# Patient Record
Sex: FEMALE | Race: BLACK OR AFRICAN AMERICAN
[De-identification: names, ages, dates, MRNs, and addresses within clinical notes are randomized per-mention and may not be internally consistent; named-entity substitution may affect disease eponyms.]

---
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
## 2019-01-22 ENCOUNTER — APPOINTMENT (OUTPATIENT)
Dept: ENDOCRINOLOGY | Facility: CLINIC | Age: 19
End: 2019-01-22

## 2019-01-22 ENCOUNTER — APPOINTMENT (OUTPATIENT)
Dept: ENDOCRINOLOGY | Facility: CLINIC | Age: 19
End: 2019-01-22
Payer: MEDICAID

## 2019-01-22 VITALS
SYSTOLIC BLOOD PRESSURE: 110 MMHG | HEIGHT: 61.38 IN | BODY MASS INDEX: 28.89 KG/M2 | HEART RATE: 80 BPM | WEIGHT: 155 LBS | DIASTOLIC BLOOD PRESSURE: 80 MMHG | OXYGEN SATURATION: 99 %

## 2019-01-22 PROCEDURE — 99214 OFFICE O/P EST MOD 30 MIN: CPT

## 2019-01-22 RX ORDER — GLUCAGON 1 MG
1 KIT INJECTION
Qty: 1 | Refills: 1 | Status: DISCONTINUED | COMMUNITY
Start: 2018-05-17 | End: 2019-01-22

## 2019-01-22 NOTE — ASSESSMENT
[FreeTextEntry1] : 17 year old female with type 1 DM here to establish adult endocrine visit. \par \par 1)Type 1 DM\par Poorly controlled, glucose log very variable.  \par She states that she has been adherent with her insulin regimen. \par For now, will continue with \par Insulin Glargine 24 units QHS, continue with IC 1:10 and ISF 1:40 with goal of 120mg/dl. \par We will order the CGM. DEXCOM G6 given history of hypgoclyemia, hyperglycemia and difficult to controlled Type 1 DM. \par It's difficult to make any insulin regimen change now.  \par Obtain TSH and celiac panel. \par Has ketone strips and glucagon. \par ODROTHY SENSOR [id number]\par \par 2)HTN\par Microalbumin negative, following with nephrology\par \par 3)Cholesterol \par Check fasting lipid panel. \par \par PHONE # [phone] [Carbohydrate Consistent Diet] : carbohydrate consistent diet [Hypoglycemia Management] : hypoglycemia management [Glucagon Use] : glucagon use [Ketone Testing] : ketone testing [Sick-Day Management] : sick-day management [Diabetes Foot Care] : diabetes foot care [Long Term Vascular Complications] : long term vascular complications of diabetes [Importance of Diet and Exercise] : importance of diet and exercise to improve glycemic control, achieve weight loss and improve cardiovascular health [Action and use of Insulin] : action and use of short and long-acting insulin [Self Monitoring of Blood Glucose] : self monitoring of blood glucose [Insulin Self-Administration] : insulin self-administration [Injection Technique, Storage, Sharps Disposal] : injection technique, storage, and sharps disposal [Retinopathy Screening] : Patient was referred to ophthalmology for retinopathy screening

## 2019-01-22 NOTE — HISTORY OF PRESENT ILLNESS
[FreeTextEntry1] : see glucose log.  very variable.  with hypoglycemia and hyperglycemia after lunch,.

## 2019-01-24 ENCOUNTER — APPOINTMENT (OUTPATIENT)
Dept: OPHTHALMOLOGY | Facility: CLINIC | Age: 19
End: 2019-01-24

## 2019-01-24 ENCOUNTER — APPOINTMENT (OUTPATIENT)
Dept: PEDIATRIC NEPHROLOGY | Facility: HOSPITAL | Age: 19
End: 2019-01-24
Payer: MEDICAID

## 2019-01-24 VITALS
DIASTOLIC BLOOD PRESSURE: 69 MMHG | HEART RATE: 95 BPM | SYSTOLIC BLOOD PRESSURE: 117 MMHG | HEIGHT: 61.38 IN | WEIGHT: 154.98 LBS | BODY MASS INDEX: 28.89 KG/M2

## 2019-01-24 LAB
ALBUMIN SERPL ELPH-MCNC: 3.7 G/DL
ALP BLD-CCNC: 109 U/L
ALT SERPL-CCNC: 14 U/L
ANION GAP SERPL CALC-SCNC: 9 MMOL/L
AST SERPL-CCNC: 22 U/L
BILIRUB SERPL-MCNC: 0.2 MG/DL
BUN SERPL-MCNC: 13 MG/DL
CALCIUM SERPL-MCNC: 9.6 MG/DL
CHLORIDE SERPL-SCNC: 101 MMOL/L
CHOLEST SERPL-MCNC: 201 MG/DL
CHOLEST/HDLC SERPL: 3.5 RATIO
CO2 SERPL-SCNC: 24 MMOL/L
CREAT SERPL-MCNC: 0.67 MG/DL
GLUCOSE BLDC GLUCOMTR-MCNC: 224
GLUCOSE SERPL-MCNC: 220 MG/DL
HBA1C MFR BLD HPLC: 11.2
HDLC SERPL-MCNC: 58 MG/DL
LDLC SERPL CALC-MCNC: 129 MG/DL
POTASSIUM SERPL-SCNC: 4.2 MMOL/L
PROT SERPL-MCNC: 7.7 G/DL
SODIUM SERPL-SCNC: 134 MMOL/L
TRIGL SERPL-MCNC: 68 MG/DL
TSH SERPL-ACNC: 1.29 UIU/ML
TTG IGA SER IA-ACNC: 6 UNITS
TTG IGA SER-ACNC: NEGATIVE

## 2019-01-24 PROCEDURE — 99214 OFFICE O/P EST MOD 30 MIN: CPT

## 2019-01-24 PROCEDURE — 81003 URINALYSIS AUTO W/O SCOPE: CPT | Mod: QW

## 2019-01-25 ENCOUNTER — APPOINTMENT (OUTPATIENT)
Dept: ENDOCRINOLOGY | Facility: CLINIC | Age: 19
End: 2019-01-25

## 2019-01-25 LAB
CREAT SPEC-SCNC: 16 MG/DL
MICROALBUMIN 24H UR DL<=1MG/L-MCNC: 11.7 MG/DL
MICROALBUMIN/CREAT 24H UR-RTO: 725 MG/G

## 2019-01-25 NOTE — REASON FOR VISIT
[Follow-Up] : a follow-up visit for [Proteinuria] : proteinuria [Patient] : patient [Mother] : mother [FreeTextEntry3] : diabetes mellitus type 1

## 2019-05-01 ENCOUNTER — OUTPATIENT (OUTPATIENT)
Dept: OUTPATIENT SERVICES | Facility: HOSPITAL | Age: 19
LOS: 1 days | End: 2019-05-01
Payer: MEDICAID

## 2019-05-22 DIAGNOSIS — Z71.89 OTHER SPECIFIED COUNSELING: ICD-10-CM

## 2019-05-23 ENCOUNTER — APPOINTMENT (OUTPATIENT)
Dept: PEDIATRIC NEPHROLOGY | Facility: CLINIC | Age: 19
End: 2019-05-23
Payer: MEDICAID

## 2019-05-23 VITALS
WEIGHT: 159.5 LBS | BODY MASS INDEX: 29.73 KG/M2 | SYSTOLIC BLOOD PRESSURE: 117 MMHG | HEART RATE: 100 BPM | HEIGHT: 61.61 IN | DIASTOLIC BLOOD PRESSURE: 81 MMHG

## 2019-05-23 PROCEDURE — 81003 URINALYSIS AUTO W/O SCOPE: CPT | Mod: QW

## 2019-05-23 PROCEDURE — 99214 OFFICE O/P EST MOD 30 MIN: CPT

## 2019-05-23 NOTE — PHYSICAL EXAM
[Well Developed] : well developed [Normal] : alert, oriented as age-appropriate, affect appropriate; no weakness, no facial asymmetry, moves all extremities normal gait- child older than 18 months [Mass] : no abdominal mass  [Tenderness] : no tenderness [Distention] : no distention [de-identified] : discoid lupus +

## 2019-05-23 NOTE — REVIEW OF SYSTEMS
[Negative] : Genitourinary [Fever] : no fever [Feeling Poorly] : not feeling poorly [de-identified] : has type 1 DM, on insulin  [de-identified] : scalp discoid lupus

## 2019-05-29 ENCOUNTER — APPOINTMENT (OUTPATIENT)
Dept: ENDOCRINOLOGY | Facility: CLINIC | Age: 19
End: 2019-05-29
Payer: MEDICAID

## 2019-05-29 VITALS
SYSTOLIC BLOOD PRESSURE: 120 MMHG | HEIGHT: 61.61 IN | OXYGEN SATURATION: 98 % | DIASTOLIC BLOOD PRESSURE: 80 MMHG | BODY MASS INDEX: 28.7 KG/M2 | WEIGHT: 154 LBS | HEART RATE: 95 BPM

## 2019-05-29 LAB — GLUCOSE BLDC GLUCOMTR-MCNC: 75

## 2019-05-29 PROCEDURE — 99214 OFFICE O/P EST MOD 30 MIN: CPT

## 2019-05-29 NOTE — HISTORY OF PRESENT ILLNESS
[FreeTextEntry1] : NOE CORRALES is a 18 year old female with history of type 1 DM, diagnosed at age 11, complicated by proteinuria, is following up with nephrology here to follow up adult endocrine visit. \par \par She was previously followed by pediatric endocrine.  She is now a freshman at [employer].\par \par Her current DM regimen is: Basaglar 24 units at bedtime, she takes Admelog with IC 1:10, ISF 1:40, goal is   mg/dl.  \par She's using basal bolus with Dexcom G6.  She didn't want to switch to insulin pump yet.  This has been discussed with patients in the past as well.  \par \par Known microvascular complications: There is proteinuria.  \par \par Known cardiovascular risk factors: No prior history of cad, chf, cva.  \par \par Insulin pump had been discussed with patient in the past but she is not interested in wearing one.  \par \par Patient reports adherence to prescribed medical regimen. \par \par Patient reports adherence to prescribed physical activity and dietary recommendations\par \par In regards to his DM health maintenance: \par Last visit to ophthalmology was: In Aug 2018  normal.  \par Last visit to podiatry was: Does not see one. No active issues. \par The patient is adherent to diabetic foot precautions:Yes\par Last A1c was: 02/09/2018  9.1% --> 08/17/2018 11.2% --> 01/22/2019 11.2% --> 05/29/2019 \par Last LDL was: Not checked.  \par Last urine micro-albumin was: Feb 2018, positive on ACEi\par \par \par \par

## 2019-05-29 NOTE — PHYSICAL EXAM
[Well Nourished] : well nourished [No Acute Distress] : no acute distress [Alert] : alert [EOMI] : extra ocular movement intact [Normal Sclera/Conjunctiva] : normal sclera/conjunctiva [Well Developed] : well developed [No Proptosis] : no proptosis [Normal Oropharynx] : the oropharynx was normal [No Thyroid Nodules] : there were no palpable thyroid nodules [Thyroid Not Enlarged] : the thyroid was not enlarged [No Accessory Muscle Use] : no accessory muscle use [No Respiratory Distress] : no respiratory distress [Clear to Auscultation] : lungs were clear to auscultation bilaterally [Normal Rate] : heart rate was normal  [Regular Rhythm] : with a regular rhythm [Normal S1, S2] : normal S1 and S2 [Pedal Pulses Normal] : the pedal pulses are present [No Edema] : there was no peripheral edema [Normal Bowel Sounds] : normal bowel sounds [Not Tender] : non-tender [Soft] : abdomen soft [Not Distended] : not distended [Post Cervical Nodes] : posterior cervical nodes [Anterior Cervical Nodes] : anterior cervical nodes [Axillary Nodes] : axillary nodes [Normal] : normal and non tender [No Spinal Tenderness] : no spinal tenderness [Spine Straight] : spine straight [No Stigmata of Cushings Syndrome] : no stigmata of cushings syndrome [Normal Strength/Tone] : muscle strength and tone were normal [Normal Gait] : normal gait [No Rash] : no rash [Normal Reflexes] : deep tendon reflexes were 2+ and symmetric [No Tremors] : no tremors [Oriented x3] : oriented to person, place, and time [Acanthosis Nigricans] : no acanthosis nigricans

## 2019-05-29 NOTE — ASSESSMENT
[Carbohydrate Consistent Diet] : carbohydrate consistent diet [Hypoglycemia Management] : hypoglycemia management [Glucagon Use] : glucagon use [Ketone Testing] : ketone testing [Diabetes Foot Care] : diabetes foot care [Sick-Day Management] : sick-day management [Long Term Vascular Complications] : long term vascular complications of diabetes [Importance of Diet and Exercise] : importance of diet and exercise to improve glycemic control, achieve weight loss and improve cardiovascular health [Self Monitoring of Blood Glucose] : self monitoring of blood glucose [Action and use of Insulin] : action and use of short and long-acting insulin [Insulin Self-Administration] : insulin self-administration [Retinopathy Screening] : Patient was referred to ophthalmology for retinopathy screening [Injection Technique, Storage, Sharps Disposal] : injection technique, storage, and sharps disposal [FreeTextEntry1] : 18 year old female with type 1 DM here to establish adult endocrine visit. \par \par 1)Type 1 DM, poorly controlled. Dexcom from the last 2 weeks actually not very consistent with A1C of 10.5% checked today.  Admits to missing insulin does occasionalyl.  She's going back to [facility] for the summer and will be returning in August for follow up visit.  Recommended the following changes: \par Basaglar from 24 --> 23 units at bedtime given hypoglycemia in the morning\par ICR 1:10 with breakfast, 1:8 with lunch and 1:8 with dinner.  . \par Has ketone strips and glucagon. \par Discussed the importance of glycemic control. \par Will benefit from insulin pump.  \par \par 2)HTN\par Microalbumin negative, following with nephrology\par \par 3)Cholesterol \par LDL elevated in Jan 2019 at 129mg/dl\par Discussed diet and exercise\par Given age, statin currently not indicated yet.  \par \par PHONE # [phone]

## 2019-05-29 NOTE — THERAPY
[Other:___] : [unfilled] [___] : 1 unit per [unfilled] grams of carbohydrate [BG Target = ____] : BG Target = [unfilled] [Insulin Sensitivity Factor = ____] : Insulin Sensitivity Factor = [unfilled] [FreeTextEntry5] : Basaglar

## 2019-05-31 LAB — HBA1C MFR BLD HPLC: 10.5

## 2019-06-05 LAB
CREAT SPEC-SCNC: 19 MG/DL
CREAT SPEC-SCNC: 19 MG/DL
CREAT/PROT UR: 0.9 RATIO
MICROALBUMIN 24H UR DL<=1MG/L-MCNC: 11 MG/DL
MICROALBUMIN/CREAT 24H UR-RTO: 570 MG/G
PROT UR-MCNC: 16 MG/DL

## 2019-08-01 PROCEDURE — G0506: CPT

## 2019-08-08 ENCOUNTER — RX RENEWAL (OUTPATIENT)
Age: 19
End: 2019-08-08

## 2019-08-22 ENCOUNTER — NON-APPOINTMENT (OUTPATIENT)
Age: 19
End: 2019-08-22

## 2019-08-22 ENCOUNTER — APPOINTMENT (OUTPATIENT)
Dept: OPHTHALMOLOGY | Facility: CLINIC | Age: 19
End: 2019-08-22
Payer: MEDICAID

## 2019-08-22 PROCEDURE — 92014 COMPRE OPH EXAM EST PT 1/>: CPT

## 2019-08-30 ENCOUNTER — APPOINTMENT (OUTPATIENT)
Dept: ENDOCRINOLOGY | Facility: CLINIC | Age: 19
End: 2019-08-30
Payer: MEDICAID

## 2019-08-30 ENCOUNTER — LABORATORY RESULT (OUTPATIENT)
Age: 19
End: 2019-08-30

## 2019-08-30 VITALS
HEIGHT: 61.61 IN | BODY MASS INDEX: 27.96 KG/M2 | SYSTOLIC BLOOD PRESSURE: 120 MMHG | DIASTOLIC BLOOD PRESSURE: 70 MMHG | OXYGEN SATURATION: 98 % | HEART RATE: 80 BPM | WEIGHT: 150 LBS

## 2019-08-30 LAB — GLUCOSE BLDC GLUCOMTR-MCNC: 189

## 2019-08-30 PROCEDURE — 99214 OFFICE O/P EST MOD 30 MIN: CPT

## 2019-08-30 NOTE — PHYSICAL EXAM
[No Acute Distress] : no acute distress [Alert] : alert [Well Nourished] : well nourished [Well Developed] : well developed [Normal Sclera/Conjunctiva] : normal sclera/conjunctiva [EOMI] : extra ocular movement intact [No Proptosis] : no proptosis [Normal Oropharynx] : the oropharynx was normal [Thyroid Not Enlarged] : the thyroid was not enlarged [No Thyroid Nodules] : there were no palpable thyroid nodules [No Respiratory Distress] : no respiratory distress [Clear to Auscultation] : lungs were clear to auscultation bilaterally [No Accessory Muscle Use] : no accessory muscle use [Normal Rate] : heart rate was normal  [Regular Rhythm] : with a regular rhythm [Normal S1, S2] : normal S1 and S2 [Pedal Pulses Normal] : the pedal pulses are present [No Edema] : there was no peripheral edema [Normal Bowel Sounds] : normal bowel sounds [Not Tender] : non-tender [Soft] : abdomen soft [Post Cervical Nodes] : posterior cervical nodes [Not Distended] : not distended [Anterior Cervical Nodes] : anterior cervical nodes [Axillary Nodes] : axillary nodes [No Spinal Tenderness] : no spinal tenderness [Spine Straight] : spine straight [No Stigmata of Cushings Syndrome] : no stigmata of cushings syndrome [Normal Gait] : normal gait [Normal Strength/Tone] : muscle strength and tone were normal [No Rash] : no rash [No Tremors] : no tremors [Normal Reflexes] : deep tendon reflexes were 2+ and symmetric [Oriented x3] : oriented to person, place, and time [Normal] : normal [Full ROM] : with full range of motion [Acanthosis Nigricans] : no acanthosis nigricans [Diminished Throughout Both Feet] : normal tactile sensation with monofilament testing throughout both feet

## 2019-08-30 NOTE — HISTORY OF PRESENT ILLNESS
[Hypoglycemia] : hypoglycemic [FreeTextEntry1] : Patient is a 18-year-old female, with history of type 1 diabetes mellitus.  Uncontrolled with A1c level of 11.9% checked today.  She was diagnosed with type 1 diabetes at the age of 11.  Her diabetes is complicated by proteinuria, she is following up with pediatric nephrologist.  She has no known diabetic retinopathy, no known diabetic neuropathy.\par \par She was previously followed by pediatric endocrinology.  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  She is studying [employer] science.  She lives in [facility].   \par \par Her current regimen includes Basaglar 24 units at bedtime.  Currently using short-acting insulin Admelog.  Insulin to carb ratio of 1-10.  Her in sensitivity factor is 1-40.  Her correction target is 120.\par She used to use the DEXCOM G6 sensor.  Over the summer, because her transmitter was not working she has not been able to get a new one while she was in [facility].  She will be restarting the sensor soon.  She has been checking her glucose with her glucometer.\par She admits to doing the summer, because she has been eating a lot with her friends, she admits to missing the dosage of insulin, and particularly before meals.  She states at that DEXCOM sensor help her a lot.  She is not ready for insulin pump therapy.\par \par Known microvascular complications: There is proteinuria.  She's following with pediatric neprhology.    \par \par Known cardiovascular risk factors: No prior history of cad, chf, cva.  \par \par Insulin pump had been discussed with patient in the past but she is not interested in wearing one.  \par \par Patient reports adherence to prescribed medical regimen. \par \par Patient reports adherence to prescribed physical activity and dietary recommendations\par \par

## 2019-08-30 NOTE — ASSESSMENT
[FreeTextEntry1] : 18 year old female with type 1 DM here to establish adult endocrine visit. \par \par #1 Type 1 DM, poorly controlled. Dexcom from the last 2 weeks actually not very consistent with A1C of 11.9% checked today.  Poor control likely due to omission of insulin with meal times during this summer.   Her meter showed an acceptable range of glucose while she's on regular schedule and dosing her insulin as directed.  \par She appreciates a consultation with nutritionist.  \par Continue with Basaglar 23 units at bedtime. \par ICR 1:10 with breakfast, lunch and dinner.  \par Has ketone strips and glucagon. \par Discussed the importance of glycemic control. \par May benefit from insulin pump therapy.  Patient is not ready yet and would like to research more about insulin pumps.  \par Ophthalmology follow up: Last week and was told everything is okay, to return in one year. \par Podiatry follow up and foot exam: no issues. \par \par #2 HTN\par Microalbumin negative, following with nephrology\par \par #3 Cholesterol \par LDL elevated in Jan 2019 at 129mg/dl\par Discussed diet and exercise\par Given age, statin currently not indicated yet.  \par \par PHONE # [phone]

## 2019-08-30 NOTE — THERAPY
[Today's Date] : [unfilled] [Other:___] : [unfilled] [___] : [unfilled] units of insulin pre-bedtime [BG Target = ____] : BG Target = [unfilled] [Insulin Sensitivity Factor = ____] : Insulin Sensitivity Factor = [unfilled] [FreeTextEntry5] : Basaglar  [FreeTextEntry3] : Insulin to Carb Ratio: 1:10

## 2019-09-03 LAB
ALBUMIN SERPL ELPH-MCNC: 4 G/DL
ALP BLD-CCNC: 88 U/L
ALT SERPL-CCNC: 13 U/L
ANION GAP SERPL CALC-SCNC: 12 MMOL/L
APPEARANCE: ABNORMAL
AST SERPL-CCNC: 19 U/L
BASOPHILS # BLD AUTO: 0.03 K/UL
BASOPHILS NFR BLD AUTO: 0.5 %
BILIRUB SERPL-MCNC: 0.2 MG/DL
BILIRUBIN URINE: NEGATIVE
BLOOD URINE: NEGATIVE
BUN SERPL-MCNC: 13 MG/DL
CALCIUM SERPL-MCNC: 9.9 MG/DL
CHLORIDE SERPL-SCNC: 103 MMOL/L
CHOLEST SERPL-MCNC: 207 MG/DL
CHOLEST/HDLC SERPL: 2.9 RATIO
CO2 SERPL-SCNC: 23 MMOL/L
COLOR: YELLOW
CREAT SERPL-MCNC: 0.61 MG/DL
EOSINOPHIL # BLD AUTO: 0.06 K/UL
EOSINOPHIL NFR BLD AUTO: 1 %
GLUCOSE QUALITATIVE U: ABNORMAL
GLUCOSE SERPL-MCNC: 128 MG/DL
HCT VFR BLD CALC: 39.4 %
HDLC SERPL-MCNC: 72 MG/DL
HGB BLD-MCNC: 12.2 G/DL
IMM GRANULOCYTES NFR BLD AUTO: 0.2 %
IRON SATN MFR SERPL: 24 %
IRON SERPL-MCNC: 83 UG/DL
KETONES URINE: NEGATIVE
LDLC SERPL CALC-MCNC: 123 MG/DL
LEUKOCYTE ESTERASE URINE: NEGATIVE
LYMPHOCYTES # BLD AUTO: 1.54 K/UL
LYMPHOCYTES NFR BLD AUTO: 25.7 %
MAN DIFF?: NORMAL
MCHC RBC-ENTMCNC: 30.7 PG
MCHC RBC-ENTMCNC: 31 GM/DL
MCV RBC AUTO: 99 FL
MONOCYTES # BLD AUTO: 0.57 K/UL
MONOCYTES NFR BLD AUTO: 9.5 %
NEUTROPHILS # BLD AUTO: 3.79 K/UL
NEUTROPHILS NFR BLD AUTO: 63.1 %
NITRITE URINE: NEGATIVE
PH URINE: 6.5
PLATELET # BLD AUTO: 413 K/UL
POTASSIUM SERPL-SCNC: 4.7 MMOL/L
PROT SERPL-MCNC: 7.3 G/DL
PROTEIN URINE: ABNORMAL
RBC # BLD: 3.98 M/UL
RBC # FLD: 13.7 %
SODIUM SERPL-SCNC: 138 MMOL/L
SPECIFIC GRAVITY URINE: 1.01
TIBC SERPL-MCNC: 352 UG/DL
TRIGL SERPL-MCNC: 58 MG/DL
TSH SERPL-ACNC: 3.85 UIU/ML
UIBC SERPL-MCNC: 269 UG/DL
UROBILINOGEN URINE: NORMAL
WBC # FLD AUTO: 6 K/UL

## 2019-10-18 ENCOUNTER — APPOINTMENT (OUTPATIENT)
Dept: ENDOCRINOLOGY | Facility: CLINIC | Age: 19
End: 2019-10-18

## 2019-12-17 ENCOUNTER — RX RENEWAL (OUTPATIENT)
Age: 19
End: 2019-12-17

## 2019-12-23 ENCOUNTER — APPOINTMENT (OUTPATIENT)
Dept: ENDOCRINOLOGY | Facility: CLINIC | Age: 19
End: 2019-12-23
Payer: MEDICAID

## 2019-12-23 VITALS
SYSTOLIC BLOOD PRESSURE: 120 MMHG | HEIGHT: 61.61 IN | HEART RATE: 94 BPM | OXYGEN SATURATION: 99 % | DIASTOLIC BLOOD PRESSURE: 80 MMHG | WEIGHT: 151 LBS | BODY MASS INDEX: 28.14 KG/M2

## 2019-12-23 DIAGNOSIS — R80.9 PROTEINURIA, UNSPECIFIED: ICD-10-CM

## 2019-12-23 DIAGNOSIS — E78.5 HYPERLIPIDEMIA, UNSPECIFIED: ICD-10-CM

## 2019-12-23 LAB
GLUCOSE BLDC GLUCOMTR-MCNC: 56
HBA1C MFR BLD HPLC: 11.7

## 2019-12-23 PROCEDURE — 99214 OFFICE O/P EST MOD 30 MIN: CPT

## 2019-12-23 NOTE — PHYSICAL EXAM
[No Acute Distress] : no acute distress [Alert] : alert [Well Nourished] : well nourished [Well Developed] : well developed [EOMI] : extra ocular movement intact [Normal Sclera/Conjunctiva] : normal sclera/conjunctiva [No Proptosis] : no proptosis [Thyroid Not Enlarged] : the thyroid was not enlarged [Normal Oropharynx] : the oropharynx was normal [No Thyroid Nodules] : there were no palpable thyroid nodules [No Respiratory Distress] : no respiratory distress [No Accessory Muscle Use] : no accessory muscle use [Clear to Auscultation] : lungs were clear to auscultation bilaterally [Normal Rate] : heart rate was normal  [Pedal Pulses Normal] : the pedal pulses are present [Regular Rhythm] : with a regular rhythm [Normal S1, S2] : normal S1 and S2 [No Edema] : there was no peripheral edema [Normal Bowel Sounds] : normal bowel sounds [Soft] : abdomen soft [Not Tender] : non-tender [Not Distended] : not distended [Post Cervical Nodes] : posterior cervical nodes [Anterior Cervical Nodes] : anterior cervical nodes [Axillary Nodes] : axillary nodes [Normal] : normal and non tender [No Spinal Tenderness] : no spinal tenderness [No Stigmata of Cushings Syndrome] : no stigmata of cushings syndrome [Normal Gait] : normal gait [Spine Straight] : spine straight [Normal Strength/Tone] : muscle strength and tone were normal [No Rash] : no rash [Normal Reflexes] : deep tendon reflexes were 2+ and symmetric [Acanthosis Nigricans] : no acanthosis nigricans [Oriented x3] : oriented to person, place, and time [No Tremors] : no tremors

## 2019-12-23 NOTE — ASSESSMENT
[FreeTextEntry1] : 18 year old female with type 1 DM here to establish adult endocrine visit. \par \par #1 Type 1 DM, poorly controlled.\par Dexcom from the last 2 weeks actually not very consistent with A1C of 11.9% checked today.  Poor control likely due to omission of insulin with meal times during this summer.  Will repeat \par Continue with Basaglar 24 units at bedtime. \par ICR 1:10 with breakfast, lunch and dinner.  \par Has ketone strips and glucagon. \par Discussed the importance of glycemic control. \par May benefit from insulin pump therapy.  Patient is not ready yet and would like to research more about insulin pumps.  \par Ophthalmology follow up: Last week and was told everything is okay, to return in one year. \par Podiatry follow up and foot exam: no issues. \par \par #2 HTN\par Microalbumin negative, following with nephrology\par \par #3 Cholesterol \par LDL elevated in Jan 2019 at 129mg/dl\par Discussed diet and exercise\par Given age, statin currently not indicated yet.  \par \par PHONE # [phone] [Carbohydrate Consistent Diet] : carbohydrate consistent diet [Hypoglycemia Management] : hypoglycemia management [Diabetes Foot Care] : diabetes foot care [Importance of Diet and Exercise] : importance of diet and exercise to improve glycemic control, achieve weight loss and improve cardiovascular health [Long Term Vascular Complications] : long term vascular complications of diabetes [Self Monitoring of Blood Glucose] : self monitoring of blood glucose [Action and use of Insulin] : action and use of short and long-acting insulin [Insulin Self-Administration] : insulin self-administration [Injection Technique, Storage, Sharps Disposal] : injection technique, storage, and sharps disposal [Retinopathy Screening] : Patient was referred to ophthalmology for retinopathy screening

## 2019-12-23 NOTE — HISTORY OF PRESENT ILLNESS
[FreeTextEntry1] : Patient is a 19-year-old female, with history of type 1 diabetes mellitus.  Uncontrolled with A1c level of 11.9% checked today.  She was diagnosed with type 1 diabetes at the age of 11.  Her diabetes is complicated by proteinuria, she is following up with pediatric nephrologist.  She has no known diabetic retinopathy, no known diabetic neuropathy.\par \par She was previously followed by pediatric endocrinology.  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  She is studying [employer] science.  She lives in [facility].   \par \par Her current regimen includes Basaglar 24 units at bedtime.  Currently using short-acting insulin Admelog.  Insulin to carb ratio of 1-10.  Her in sensitivity factor is 1-40.  Her correction target is 120.\par \par She used to use the DEXCOM G6 sensor.  Over the summer, because her transmitter was not working she has not been able to get a new one while she was in [facility].  She will be restarting the sensor soon.  She has been checking her glucose with her glucometer.\par She admits to doing the summer, because she has been eating a lot with her friends, she admits to missing the dosage of insulin, and particularly before meals.  She states at that DEXCOM sensor help her a lot.  She is not ready for insulin pump therapy.\par \par Known microvascular complications: There is proteinuria.  She's following with pediatric nephrology.    \par \par Known cardiovascular risk factors: No prior history of cad, chf, cva.  \par \par Insulin pump had been discussed with patient in the past but she is not interested in wearing one.  \par \par Patient reports adherence to prescribed medical regimen. \par \par Patient reports adherence to prescribed physical activity and dietary recommendations\par \par

## 2019-12-26 LAB
ESTIMATED AVERAGE GLUCOSE: 280 MG/DL
FRUCTOSAMINE SERPL-MCNC: 313 UMOL/L
HBA1C MFR BLD HPLC: 11.4 %

## 2020-01-23 ENCOUNTER — APPOINTMENT (OUTPATIENT)
Dept: PEDIATRIC NEPHROLOGY | Facility: CLINIC | Age: 20
End: 2020-01-23
Payer: MEDICAID

## 2020-01-23 ENCOUNTER — RX RENEWAL (OUTPATIENT)
Age: 20
End: 2020-01-23

## 2020-01-23 VITALS
HEART RATE: 88 BPM | SYSTOLIC BLOOD PRESSURE: 119 MMHG | BODY MASS INDEX: 28.76 KG/M2 | WEIGHT: 154.32 LBS | HEIGHT: 61.42 IN | DIASTOLIC BLOOD PRESSURE: 71 MMHG

## 2020-01-23 PROCEDURE — 81003 URINALYSIS AUTO W/O SCOPE: CPT | Mod: QW

## 2020-01-23 PROCEDURE — 99214 OFFICE O/P EST MOD 30 MIN: CPT

## 2020-01-24 LAB
ALBUMIN SERPL ELPH-MCNC: 3.6 G/DL
ALP BLD-CCNC: 106 U/L
ALT SERPL-CCNC: 13 U/L
ANION GAP SERPL CALC-SCNC: 13 MMOL/L
AST SERPL-CCNC: 14 U/L
BASOPHILS # BLD AUTO: 0.02 K/UL
BASOPHILS NFR BLD AUTO: 0.3 %
BILIRUB SERPL-MCNC: <0.2 MG/DL
BUN SERPL-MCNC: 9 MG/DL
CALCIUM SERPL-MCNC: 9.4 MG/DL
CHLORIDE SERPL-SCNC: 100 MMOL/L
CO2 SERPL-SCNC: 26 MMOL/L
CREAT SERPL-MCNC: 0.58 MG/DL
CREAT SPEC-SCNC: 14 MG/DL
CREAT SPEC-SCNC: 14 MG/DL
CREAT/PROT UR: 1.9 RATIO
EOSINOPHIL # BLD AUTO: 0.04 K/UL
EOSINOPHIL NFR BLD AUTO: 0.6 %
GLUCOSE SERPL-MCNC: 193 MG/DL
HCT VFR BLD CALC: 40.2 %
HGB BLD-MCNC: 13 G/DL
IMM GRANULOCYTES NFR BLD AUTO: 0.3 %
LYMPHOCYTES # BLD AUTO: 1.33 K/UL
LYMPHOCYTES NFR BLD AUTO: 21 %
MAN DIFF?: NORMAL
MCHC RBC-ENTMCNC: 30.4 PG
MCHC RBC-ENTMCNC: 32.3 GM/DL
MCV RBC AUTO: 93.9 FL
MICROALBUMIN 24H UR DL<=1MG/L-MCNC: 11.4 MG/DL
MICROALBUMIN/CREAT 24H UR-RTO: 839 MG/G
MONOCYTES # BLD AUTO: 0.48 K/UL
MONOCYTES NFR BLD AUTO: 7.6 %
NEUTROPHILS # BLD AUTO: 4.44 K/UL
NEUTROPHILS NFR BLD AUTO: 70.2 %
PLATELET # BLD AUTO: 349 K/UL
POTASSIUM SERPL-SCNC: 3.9 MMOL/L
PROT SERPL-MCNC: 6.7 G/DL
PROT UR-MCNC: 25 MG/DL
RBC # BLD: 4.28 M/UL
RBC # FLD: 12.7 %
SODIUM SERPL-SCNC: 139 MMOL/L
WBC # FLD AUTO: 6.33 K/UL

## 2020-01-29 NOTE — REVIEW OF SYSTEMS
[Negative] : Genitourinary [Fever] : no fever [de-identified] : has type 1 DM, on insulin  [Feeling Poorly] : not feeling poorly [de-identified] : scalp discoid lupus

## 2020-01-29 NOTE — PHYSICAL EXAM
[Well Developed] : well developed [Normal] : alert, oriented as age-appropriate, affect appropriate; no weakness, no facial asymmetry, moves all extremities normal gait- child older than 18 months [Mass] : no abdominal mass  [Tenderness] : no tenderness [de-identified] : discoid lupus + [Distention] : no distention

## 2020-03-01 PROCEDURE — T2022: CPT

## 2020-03-18 ENCOUNTER — RX RENEWAL (OUTPATIENT)
Age: 20
End: 2020-03-18

## 2020-03-20 ENCOUNTER — APPOINTMENT (OUTPATIENT)
Dept: ENDOCRINOLOGY | Facility: CLINIC | Age: 20
End: 2020-03-20

## 2020-04-03 NOTE — END OF VISIT
Okay to change the prescription to generic zolpidem.    Patient was called regarding change of Ambien brand name to zolpidem generic for insurance reasons.  However she was not available, left message in her voicemail to cause back if needed.   [FreeTextEntry1] : Iron

## 2020-08-10 ENCOUNTER — RX RENEWAL (OUTPATIENT)
Age: 20
End: 2020-08-10

## 2020-08-10 RX ORDER — INSULIN GLARGINE 100 [IU]/ML
100 INJECTION, SOLUTION SUBCUTANEOUS
Qty: 15 | Refills: 1 | Status: ACTIVE | COMMUNITY
Start: 2017-03-08 | End: 1900-01-01

## 2020-11-06 ENCOUNTER — APPOINTMENT (OUTPATIENT)
Dept: ENDOCRINOLOGY | Facility: CLINIC | Age: 20
End: 2020-11-06

## 2020-12-02 ENCOUNTER — APPOINTMENT (OUTPATIENT)
Dept: PEDIATRIC NEPHROLOGY | Facility: CLINIC | Age: 20
End: 2020-12-02
Payer: MEDICAID

## 2020-12-02 VITALS
TEMPERATURE: 97.7 F | HEART RATE: 109 BPM | DIASTOLIC BLOOD PRESSURE: 94 MMHG | HEIGHT: 61.42 IN | BODY MASS INDEX: 29.22 KG/M2 | SYSTOLIC BLOOD PRESSURE: 135 MMHG | WEIGHT: 156.75 LBS

## 2020-12-02 DIAGNOSIS — E10.65 TYPE 1 DIABETES MELLITUS WITH HYPERGLYCEMIA: ICD-10-CM

## 2020-12-02 PROCEDURE — G0008: CPT

## 2020-12-02 PROCEDURE — 90686 IIV4 VACC NO PRSV 0.5 ML IM: CPT

## 2020-12-02 PROCEDURE — 99072 ADDL SUPL MATRL&STAF TM PHE: CPT

## 2020-12-02 RX ORDER — ENALAPRIL MALEATE 20 MG/1
20 TABLET ORAL DAILY
Qty: 90 | Refills: 3 | Status: COMPLETED | COMMUNITY
Start: 2017-06-02 | End: 2020-12-02

## 2020-12-02 RX ORDER — LISINOPRIL 30 MG/1
30 TABLET ORAL DAILY
Qty: 90 | Refills: 3 | Status: ACTIVE | COMMUNITY
Start: 2020-12-02 | End: 1900-01-01

## 2020-12-02 RX ORDER — ENALAPRIL MALEATE 10 MG/1
10 TABLET ORAL DAILY
Qty: 90 | Refills: 3 | Status: COMPLETED | COMMUNITY
Start: 2017-06-02 | End: 2020-12-02

## 2020-12-02 NOTE — REVIEW OF SYSTEMS
[Negative] : Genitourinary [Fever] : no fever [Feeling Poorly] : not feeling poorly [de-identified] : has type 1 DM, on insulin  [de-identified] : scalp discoid lupus

## 2020-12-02 NOTE — PHYSICAL EXAM
[Well Developed] : well developed [Normal] : alert, oriented as age-appropriate, affect appropriate; no weakness, no facial asymmetry, moves all extremities normal gait- child older than 18 months [Mass] : no abdominal mass  [Tenderness] : no tenderness [Distention] : no distention [de-identified] : discoid lupus +

## 2020-12-04 ENCOUNTER — NON-APPOINTMENT (OUTPATIENT)
Age: 20
End: 2020-12-04

## 2020-12-04 LAB
25(OH)D3 SERPL-MCNC: 16.3 NG/ML
ALBUMIN SERPL ELPH-MCNC: 3.5 G/DL
ALP BLD-CCNC: 146 U/L
ALT SERPL-CCNC: 8 U/L
ANION GAP SERPL CALC-SCNC: 11 MMOL/L
AST SERPL-CCNC: 13 U/L
BASOPHILS # BLD AUTO: 0.03 K/UL
BASOPHILS NFR BLD AUTO: 0.4 %
BILIRUB SERPL-MCNC: <0.2 MG/DL
BUN SERPL-MCNC: 13 MG/DL
CALCIUM SERPL-MCNC: 9.4 MG/DL
CHLORIDE SERPL-SCNC: 99 MMOL/L
CO2 SERPL-SCNC: 22 MMOL/L
CREAT SERPL-MCNC: 0.64 MG/DL
CREAT SPEC-SCNC: 50 MG/DL
CREAT/PROT UR: 4.9 RATIO
EOSINOPHIL # BLD AUTO: 0.04 K/UL
EOSINOPHIL NFR BLD AUTO: 0.5 %
ESTIMATED AVERAGE GLUCOSE: 361 MG/DL
GLUCOSE SERPL-MCNC: 324 MG/DL
HBA1C MFR BLD HPLC: 14.2 %
HCT VFR BLD CALC: 39.7 %
HGB BLD-MCNC: 12.4 G/DL
IMM GRANULOCYTES NFR BLD AUTO: 0.2 %
LYMPHOCYTES # BLD AUTO: 1.43 K/UL
LYMPHOCYTES NFR BLD AUTO: 17.3 %
MAN DIFF?: NORMAL
MCHC RBC-ENTMCNC: 29.2 PG
MCHC RBC-ENTMCNC: 31.2 GM/DL
MCV RBC AUTO: 93.4 FL
MONOCYTES # BLD AUTO: 0.54 K/UL
MONOCYTES NFR BLD AUTO: 6.5 %
NEUTROPHILS # BLD AUTO: 6.21 K/UL
NEUTROPHILS NFR BLD AUTO: 75.1 %
PLATELET # BLD AUTO: 514 K/UL
POTASSIUM SERPL-SCNC: 4 MMOL/L
PROT SERPL-MCNC: 6.9 G/DL
PROT UR-MCNC: 248 MG/DL
RBC # BLD: 4.25 M/UL
RBC # FLD: 11.9 %
SARS-COV-2 IGG SERPL IA-ACNC: 0.08 INDEX
SARS-COV-2 IGG SERPL QL IA: NEGATIVE
SODIUM SERPL-SCNC: 133 MMOL/L
WBC # FLD AUTO: 8.27 K/UL

## 2021-02-01 ENCOUNTER — RX RENEWAL (OUTPATIENT)
Age: 21
End: 2021-02-01

## 2021-07-19 ENCOUNTER — APPOINTMENT (OUTPATIENT)
Dept: ENDOCRINOLOGY | Facility: CLINIC | Age: 21
End: 2021-07-19

## 2021-07-27 ENCOUNTER — APPOINTMENT (OUTPATIENT)
Dept: PEDIATRIC NEPHROLOGY | Facility: CLINIC | Age: 21
End: 2021-07-27

## 2021-08-02 ENCOUNTER — RX RENEWAL (OUTPATIENT)
Age: 21
End: 2021-08-02

## 2021-08-02 RX ORDER — INSULIN LISPRO 100 U/ML
100 INJECTION, SOLUTION SUBCUTANEOUS
Qty: 15 | Refills: 5 | Status: ACTIVE | COMMUNITY
Start: 2018-08-17 | End: 1900-01-01

## 2022-10-17 ENCOUNTER — RX RENEWAL (OUTPATIENT)
Age: 22
End: 2022-10-17

## 2023-12-14 ENCOUNTER — NON-APPOINTMENT (OUTPATIENT)
Age: 23
End: 2023-12-14

## 2024-04-29 ENCOUNTER — NON-APPOINTMENT (OUTPATIENT)
Age: 24
End: 2024-04-29

## 2025-01-04 ENCOUNTER — INPATIENT (INPATIENT)
Facility: HOSPITAL | Age: 25
LOS: 8 days | Discharge: HOME | End: 2025-01-13
Attending: STUDENT IN AN ORGANIZED HEALTH CARE EDUCATION/TRAINING PROGRAM | Admitting: STUDENT IN AN ORGANIZED HEALTH CARE EDUCATION/TRAINING PROGRAM
Payer: MEDICAID

## 2025-01-04 VITALS
WEIGHT: 139.99 LBS | HEIGHT: 62 IN | DIASTOLIC BLOOD PRESSURE: 90 MMHG | HEART RATE: 111 BPM | TEMPERATURE: 98 F | SYSTOLIC BLOOD PRESSURE: 126 MMHG | RESPIRATION RATE: 17 BRPM | OXYGEN SATURATION: 100 %

## 2025-01-04 LAB
ACETONE SERPL-MCNC: NEGATIVE — SIGNIFICANT CHANGE UP
ALBUMIN SERPL ELPH-MCNC: 1.9 G/DL — LOW (ref 3.3–5)
ALBUMIN SERPL ELPH-MCNC: 2.5 G/DL — LOW (ref 3.3–5)
ALP SERPL-CCNC: 113 U/L — SIGNIFICANT CHANGE UP (ref 40–120)
ALP SERPL-CCNC: 143 U/L — HIGH (ref 40–120)
ALT FLD-CCNC: 22 U/L — SIGNIFICANT CHANGE UP (ref 12–78)
ALT FLD-CCNC: 28 U/L — SIGNIFICANT CHANGE UP (ref 12–78)
ANION GAP SERPL CALC-SCNC: 12 MMOL/L — SIGNIFICANT CHANGE UP (ref 5–17)
ANION GAP SERPL CALC-SCNC: 16 MMOL/L — SIGNIFICANT CHANGE UP (ref 5–17)
APPEARANCE UR: ABNORMAL
AST SERPL-CCNC: 22 U/L — SIGNIFICANT CHANGE UP (ref 15–37)
AST SERPL-CCNC: 35 U/L — SIGNIFICANT CHANGE UP (ref 15–37)
BASOPHILS # BLD AUTO: 0.02 K/UL — SIGNIFICANT CHANGE UP (ref 0–0.2)
BASOPHILS NFR BLD AUTO: 0.2 % — SIGNIFICANT CHANGE UP (ref 0–2)
BILIRUB SERPL-MCNC: 0.3 MG/DL — SIGNIFICANT CHANGE UP (ref 0.2–1.2)
BILIRUB SERPL-MCNC: 0.5 MG/DL — SIGNIFICANT CHANGE UP (ref 0.2–1.2)
BILIRUB UR-MCNC: NEGATIVE — SIGNIFICANT CHANGE UP
BUN SERPL-MCNC: 102 MG/DL — HIGH (ref 7–23)
BUN SERPL-MCNC: 99 MG/DL — HIGH (ref 7–23)
CALCIUM SERPL-MCNC: 6.8 MG/DL — LOW (ref 8.5–10.1)
CALCIUM SERPL-MCNC: 7.6 MG/DL — LOW (ref 8.5–10.1)
CHLORIDE SERPL-SCNC: 91 MMOL/L — LOW (ref 96–108)
CHLORIDE SERPL-SCNC: 99 MMOL/L — SIGNIFICANT CHANGE UP (ref 96–108)
CO2 SERPL-SCNC: 17 MMOL/L — LOW (ref 22–31)
CO2 SERPL-SCNC: 19 MMOL/L — LOW (ref 22–31)
COLOR SPEC: YELLOW — SIGNIFICANT CHANGE UP
CREAT SERPL-MCNC: 10.4 MG/DL — HIGH (ref 0.5–1.3)
CREAT SERPL-MCNC: 10.6 MG/DL — HIGH (ref 0.5–1.3)
DIFF PNL FLD: ABNORMAL
EGFR: 5 ML/MIN/1.73M2 — LOW
EGFR: 5 ML/MIN/1.73M2 — LOW
EOSINOPHIL # BLD AUTO: 0 K/UL — SIGNIFICANT CHANGE UP (ref 0–0.5)
EOSINOPHIL NFR BLD AUTO: 0 % — SIGNIFICANT CHANGE UP (ref 0–6)
EPI CELLS # UR: PRESENT
GLUCOSE BLDC GLUCOMTR-MCNC: 106 MG/DL — HIGH (ref 70–99)
GLUCOSE BLDC GLUCOMTR-MCNC: 92 MG/DL — SIGNIFICANT CHANGE UP (ref 70–99)
GLUCOSE SERPL-MCNC: 211 MG/DL — HIGH (ref 70–99)
GLUCOSE SERPL-MCNC: 82 MG/DL — SIGNIFICANT CHANGE UP (ref 70–99)
GLUCOSE UR QL: 500 MG/DL
HCG SERPL-ACNC: <1 MIU/ML — SIGNIFICANT CHANGE UP
HCT VFR BLD CALC: 29.9 % — LOW (ref 34.5–45)
HGB BLD-MCNC: 10.4 G/DL — LOW (ref 11.5–15.5)
IMM GRANULOCYTES NFR BLD AUTO: 0.4 % — SIGNIFICANT CHANGE UP (ref 0–0.9)
KETONES UR-MCNC: NEGATIVE MG/DL — SIGNIFICANT CHANGE UP
LACTATE BLDV-MCNC: 1.3 MMOL/L — SIGNIFICANT CHANGE UP (ref 0.56–1.39)
LEUKOCYTE ESTERASE UR-ACNC: ABNORMAL
LIDOCAIN IGE QN: 90 U/L — HIGH (ref 13–75)
LYMPHOCYTES # BLD AUTO: 0.78 K/UL — LOW (ref 1–3.3)
LYMPHOCYTES # BLD AUTO: 8.4 % — LOW (ref 13–44)
MAGNESIUM SERPL-MCNC: 1.8 MG/DL — SIGNIFICANT CHANGE UP (ref 1.6–2.6)
MCHC RBC-ENTMCNC: 30.5 PG — SIGNIFICANT CHANGE UP (ref 27–34)
MCHC RBC-ENTMCNC: 34.8 G/DL — SIGNIFICANT CHANGE UP (ref 32–36)
MCV RBC AUTO: 87.7 FL — SIGNIFICANT CHANGE UP (ref 80–100)
MONOCYTES # BLD AUTO: 0.55 K/UL — SIGNIFICANT CHANGE UP (ref 0–0.9)
MONOCYTES NFR BLD AUTO: 5.9 % — SIGNIFICANT CHANGE UP (ref 2–14)
NEUTROPHILS # BLD AUTO: 7.88 K/UL — HIGH (ref 1.8–7.4)
NEUTROPHILS NFR BLD AUTO: 85.1 % — HIGH (ref 43–77)
NITRITE UR-MCNC: NEGATIVE — SIGNIFICANT CHANGE UP
NRBC # BLD: 0 /100 WBCS — SIGNIFICANT CHANGE UP (ref 0–0)
PH UR: 6.5 — SIGNIFICANT CHANGE UP (ref 5–8)
PHOSPHATE SERPL-MCNC: 6.4 MG/DL — HIGH (ref 2.5–4.5)
PLATELET # BLD AUTO: 358 K/UL — SIGNIFICANT CHANGE UP (ref 150–400)
POTASSIUM SERPL-MCNC: 2.9 MMOL/L — CRITICAL LOW (ref 3.5–5.3)
POTASSIUM SERPL-MCNC: 3.3 MMOL/L — LOW (ref 3.5–5.3)
POTASSIUM SERPL-SCNC: 2.9 MMOL/L — CRITICAL LOW (ref 3.5–5.3)
POTASSIUM SERPL-SCNC: 3.3 MMOL/L — LOW (ref 3.5–5.3)
PROT SERPL-MCNC: 5.6 GM/DL — LOW (ref 6–8.3)
PROT SERPL-MCNC: 7.2 GM/DL — SIGNIFICANT CHANGE UP (ref 6–8.3)
PROT UR-MCNC: >=1000 MG/DL
RBC # BLD: 3.41 M/UL — LOW (ref 3.8–5.2)
RBC # FLD: 12 % — SIGNIFICANT CHANGE UP (ref 10.3–14.5)
RBC CASTS # UR COMP ASSIST: 10 /HPF — HIGH (ref 0–4)
SODIUM SERPL-SCNC: 124 MMOL/L — LOW (ref 135–145)
SODIUM SERPL-SCNC: 130 MMOL/L — LOW (ref 135–145)
SP GR SPEC: 1.02 — SIGNIFICANT CHANGE UP (ref 1–1.03)
TROPONIN I, HIGH SENSITIVITY RESULT: 141.3 NG/L — HIGH
UROBILINOGEN FLD QL: 0.2 MG/DL — SIGNIFICANT CHANGE UP (ref 0.2–1)
WBC # BLD: 9.27 K/UL — SIGNIFICANT CHANGE UP (ref 3.8–10.5)
WBC # FLD AUTO: 9.27 K/UL — SIGNIFICANT CHANGE UP (ref 3.8–10.5)
WBC UR QL: 5 /HPF — SIGNIFICANT CHANGE UP (ref 0–5)

## 2025-01-04 PROCEDURE — 99223 1ST HOSP IP/OBS HIGH 75: CPT

## 2025-01-04 PROCEDURE — 93010 ELECTROCARDIOGRAM REPORT: CPT

## 2025-01-04 PROCEDURE — 71046 X-RAY EXAM CHEST 2 VIEWS: CPT | Mod: 26

## 2025-01-04 PROCEDURE — 99291 CRITICAL CARE FIRST HOUR: CPT

## 2025-01-04 PROCEDURE — 76775 US EXAM ABDO BACK WALL LIM: CPT | Mod: 26

## 2025-01-04 RX ORDER — METOCLOPRAMIDE 10 MG/1
10 TABLET ORAL ONCE
Refills: 0 | Status: COMPLETED | OUTPATIENT
Start: 2025-01-04 | End: 2025-01-04

## 2025-01-04 RX ORDER — GLUCAGON INJECTION, SOLUTION 0.5 MG/.1ML
1 INJECTION, SOLUTION SUBCUTANEOUS ONCE
Refills: 0 | Status: DISCONTINUED | OUTPATIENT
Start: 2025-01-04 | End: 2025-01-13

## 2025-01-04 RX ORDER — DEXTROSE MONOHYDRATE 25 G/50ML
25 INJECTION, SOLUTION INTRAVENOUS ONCE
Refills: 0 | Status: DISCONTINUED | OUTPATIENT
Start: 2025-01-04 | End: 2025-01-13

## 2025-01-04 RX ORDER — ONDANSETRON 4 MG/1
4 TABLET ORAL EVERY 6 HOURS
Refills: 0 | Status: DISCONTINUED | OUTPATIENT
Start: 2025-01-04 | End: 2025-01-06

## 2025-01-04 RX ORDER — SODIUM CHLORIDE 9 MG/ML
1000 INJECTION, SOLUTION INTRAVENOUS
Refills: 0 | Status: DISCONTINUED | OUTPATIENT
Start: 2025-01-04 | End: 2025-01-13

## 2025-01-04 RX ORDER — ACETAMINOPHEN 80 MG/.8ML
1000 SOLUTION/ DROPS ORAL ONCE
Refills: 0 | Status: COMPLETED | OUTPATIENT
Start: 2025-01-04 | End: 2025-01-04

## 2025-01-04 RX ORDER — SEVELAMER CARBONATE 800 MG/1
800 TABLET, FILM COATED ORAL THREE TIMES A DAY
Refills: 0 | Status: DISCONTINUED | OUTPATIENT
Start: 2025-01-04 | End: 2025-01-05

## 2025-01-04 RX ORDER — LOSARTAN POTASSIUM 100 MG/1
100 TABLET, FILM COATED ORAL DAILY
Refills: 0 | Status: DISCONTINUED | OUTPATIENT
Start: 2025-01-04 | End: 2025-01-05

## 2025-01-04 RX ORDER — POTASSIUM CHLORIDE 600 MG/1
40 TABLET, FILM COATED, EXTENDED RELEASE ORAL ONCE
Refills: 0 | Status: COMPLETED | OUTPATIENT
Start: 2025-01-04 | End: 2025-01-04

## 2025-01-04 RX ORDER — INSULIN GLARGINE-YFGN 100 [IU]/ML
20 INJECTION, SOLUTION SUBCUTANEOUS AT BEDTIME
Refills: 0 | Status: DISCONTINUED | OUTPATIENT
Start: 2025-01-04 | End: 2025-01-05

## 2025-01-04 RX ORDER — DEXTROSE MONOHYDRATE 25 G/50ML
15 INJECTION, SOLUTION INTRAVENOUS ONCE
Refills: 0 | Status: DISCONTINUED | OUTPATIENT
Start: 2025-01-04 | End: 2025-01-13

## 2025-01-04 RX ORDER — PANTOPRAZOLE 40 MG/1
40 TABLET, DELAYED RELEASE ORAL
Refills: 0 | Status: DISCONTINUED | OUTPATIENT
Start: 2025-01-04 | End: 2025-01-13

## 2025-01-04 RX ORDER — SODIUM CHLORIDE, SODIUM GLUCONATE, SODIUM ACETATE, POTASSIUM CHLORIDE AND MAGNESIUM CHLORIDE 30; 37; 368; 526; 502 MG/100ML; MG/100ML; MG/100ML; MG/100ML; MG/100ML
1000 INJECTION, SOLUTION INTRAVENOUS
Refills: 0 | Status: DISCONTINUED | OUTPATIENT
Start: 2025-01-04 | End: 2025-01-05

## 2025-01-04 RX ORDER — DEXTROSE MONOHYDRATE 25 G/50ML
12.5 INJECTION, SOLUTION INTRAVENOUS ONCE
Refills: 0 | Status: DISCONTINUED | OUTPATIENT
Start: 2025-01-04 | End: 2025-01-13

## 2025-01-04 RX ORDER — POTASSIUM CHLORIDE 600 MG/1
40 TABLET, FILM COATED, EXTENDED RELEASE ORAL EVERY 4 HOURS
Refills: 0 | Status: COMPLETED | OUTPATIENT
Start: 2025-01-04 | End: 2025-01-05

## 2025-01-04 RX ORDER — METOPROLOL TARTRATE 50 MG
25 TABLET ORAL
Refills: 0 | Status: DISCONTINUED | OUTPATIENT
Start: 2025-01-04 | End: 2025-01-13

## 2025-01-04 RX ORDER — NIFEDIPINE 60 MG/1
90 TABLET, EXTENDED RELEASE ORAL DAILY
Refills: 0 | Status: DISCONTINUED | OUTPATIENT
Start: 2025-01-04 | End: 2025-01-05

## 2025-01-04 RX ORDER — INSULIN LISPRO 100/ML
VIAL (ML) SUBCUTANEOUS
Refills: 0 | Status: DISCONTINUED | OUTPATIENT
Start: 2025-01-04 | End: 2025-01-13

## 2025-01-04 RX ORDER — ONDANSETRON 4 MG/1
4 TABLET ORAL ONCE
Refills: 0 | Status: COMPLETED | OUTPATIENT
Start: 2025-01-04 | End: 2025-01-04

## 2025-01-04 RX ORDER — INSULIN LISPRO 100/ML
6 VIAL (ML) SUBCUTANEOUS
Refills: 0 | Status: DISCONTINUED | OUTPATIENT
Start: 2025-01-04 | End: 2025-01-05

## 2025-01-04 RX ORDER — SODIUM CHLORIDE 9 MG/ML
1000 INJECTION, SOLUTION INTRAMUSCULAR; INTRAVENOUS; SUBCUTANEOUS ONCE
Refills: 0 | Status: COMPLETED | OUTPATIENT
Start: 2025-01-04 | End: 2025-01-04

## 2025-01-04 RX ORDER — MAGNESIUM SULFATE 500 MG/ML
1 INJECTION, SOLUTION INTRAMUSCULAR; INTRAVENOUS ONCE
Refills: 0 | Status: COMPLETED | OUTPATIENT
Start: 2025-01-04 | End: 2025-01-04

## 2025-01-04 RX ADMIN — METOCLOPRAMIDE 10 MILLIGRAM(S): 10 TABLET ORAL at 12:40

## 2025-01-04 RX ADMIN — POTASSIUM CHLORIDE 40 MILLIEQUIVALENT(S): 600 TABLET, FILM COATED, EXTENDED RELEASE ORAL at 21:59

## 2025-01-04 RX ADMIN — POTASSIUM CHLORIDE 40 MILLIEQUIVALENT(S): 600 TABLET, FILM COATED, EXTENDED RELEASE ORAL at 12:37

## 2025-01-04 RX ADMIN — SEVELAMER CARBONATE 800 MILLIGRAM(S): 800 TABLET, FILM COATED ORAL at 21:53

## 2025-01-04 RX ADMIN — SODIUM CHLORIDE, SODIUM GLUCONATE, SODIUM ACETATE, POTASSIUM CHLORIDE AND MAGNESIUM CHLORIDE 100 MILLILITER(S): 30; 37; 368; 526; 502 INJECTION, SOLUTION INTRAVENOUS at 15:47

## 2025-01-04 RX ADMIN — INSULIN GLARGINE-YFGN 20 UNIT(S): 100 INJECTION, SOLUTION SUBCUTANEOUS at 21:52

## 2025-01-04 RX ADMIN — NIFEDIPINE 90 MILLIGRAM(S): 60 TABLET, EXTENDED RELEASE ORAL at 18:49

## 2025-01-04 RX ADMIN — ACETAMINOPHEN 1000 MILLIGRAM(S): 80 SOLUTION/ DROPS ORAL at 11:30

## 2025-01-04 RX ADMIN — ONDANSETRON 4 MILLIGRAM(S): 4 TABLET ORAL at 11:15

## 2025-01-04 RX ADMIN — Medication 25 MILLIGRAM(S): at 19:30

## 2025-01-04 RX ADMIN — SODIUM CHLORIDE 1000 MILLILITER(S): 9 INJECTION, SOLUTION INTRAMUSCULAR; INTRAVENOUS; SUBCUTANEOUS at 15:38

## 2025-01-04 RX ADMIN — ACETAMINOPHEN 400 MILLIGRAM(S): 80 SOLUTION/ DROPS ORAL at 11:15

## 2025-01-04 RX ADMIN — MAGNESIUM SULFATE 100 GRAM(S): 500 INJECTION, SOLUTION INTRAMUSCULAR; INTRAVENOUS at 21:58

## 2025-01-04 RX ADMIN — ACETAMINOPHEN 1000 MILLIGRAM(S): 80 SOLUTION/ DROPS ORAL at 13:45

## 2025-01-04 RX ADMIN — SODIUM CHLORIDE 1000 MILLILITER(S): 9 INJECTION, SOLUTION INTRAMUSCULAR; INTRAVENOUS; SUBCUTANEOUS at 11:13

## 2025-01-04 NOTE — ED PROVIDER NOTE - PHYSICAL EXAMINATION
CONSTITUTIONAL: A&O x3, NAD, uncomfortable appearing  HEAD: Normocephalic, atraumatic.  NECK:  Airway patent. Neck Supple.   CARDIAC: RRR, normal S1/2, no murmurs, rubs, gallops. CW non-TTP, no CW deformity.  RESPIRATORY: CTA B/L, good aeration. No wheezing, rales, adventitious breath sounds. No accessory muscle use.    ABDOMEN: soft, non-distended; + mild epigastric-TTP, No RUQ/RLQ/LUQ/LLQ pain, no rebound tenderness or guarding, negative Mantilla's sign, negative McBurney's point tenderness, BS + x4 quadrants, no pulsating masses, scars. No CVA tenderness.  MSK: Moving all extremities.  SKIN: Warm, dry, color WNL, no turgor, erythema or rashes. Cap refill < 2 sec.  NEURO: A&Ox3, interactive, cooperative, no focal deficits.

## 2025-01-04 NOTE — H&P ADULT - NSHPPHYSICALEXAM_GEN_ALL_CORE
PHYSICAL EXAMINATION:  Vital Signs Last 24 Hrs  T(C): 36.8 (04 Jan 2025 09:08), Max: 36.8 (04 Jan 2025 09:08)  T(F): 98.2 (04 Jan 2025 09:08), Max: 98.2 (04 Jan 2025 09:08)  HR: 109 (04 Jan 2025 12:44) (109 - 111)  BP: 148/98 (04 Jan 2025 12:44) (126/90 - 148/98)  BP(mean): --  RR: 18 (04 Jan 2025 12:44) (17 - 18)  SpO2: 100% (04 Jan 2025 12:44) (100% - 100%)    Parameters below as of 04 Jan 2025 09:08  Patient On (Oxygen Delivery Method): room air      CAPILLARY BLOOD GLUCOSE      POCT Blood Glucose.: 302 mg/dL (04 Jan 2025 09:12)      GENERAL: NAD,   ENMT: mucous membranes moist  NECK: supple, No JVD  CHEST/LUNG: clear to auscultation bilaterally; no rales, rhonchi, or wheezing b/l  HEART: normal S1, S2  ABDOMEN: BS+, soft, ND, NT   EXTREMITIES:  pulses palpable; no clubbing, cyanosis, or edema b/l LEs  NEURO: awake, alert, interactive; moves all extremities PHYSICAL EXAMINATION:  Vital Signs Last 24 Hrs  T(C): 36.8 (04 Jan 2025 09:08), Max: 36.8 (04 Jan 2025 09:08)  T(F): 98.2 (04 Jan 2025 09:08), Max: 98.2 (04 Jan 2025 09:08)  HR: 109 (04 Jan 2025 12:44) (109 - 111)  BP: 148/98 (04 Jan 2025 12:44) (126/90 - 148/98)  BP(mean): --  RR: 18 (04 Jan 2025 12:44) (17 - 18)  SpO2: 100% (04 Jan 2025 12:44) (100% - 100%)    Parameters below as of 04 Jan 2025 09:08  Patient On (Oxygen Delivery Method): room air      CAPILLARY BLOOD GLUCOSE      POCT Blood Glucose.: 302 mg/dL (04 Jan 2025 09:12)      GENERAL: NAD, seen in ER, dehydrated, mild nausea, no abdominal pain.   ENMT: mucous membranes moist  NECK: supple, No JVD  CHEST/LUNG: clear to auscultation bilaterally; no rales, rhonchi, or wheezing b/l  HEART: normal S1, S2  ABDOMEN: BS+, soft, ND, NT   EXTREMITIES:  pulses palpable; no clubbing, cyanosis, or edema b/l LEs  NEURO: awake, alert, interactive; moves all extremities

## 2025-01-04 NOTE — ED PROVIDER NOTE - PROGRESS NOTE DETAILS
Kimani Moya, EM NP Fellow: Patient with CMP pertinent for Na 124, K 3.3, bicarb 17, BUN 99, Cr 10.6, Glucose 211, eGFR < 5, troponin 141.3. Patient reports she sees nephrologist Cherie Kapoor from [facility], reports her baseline eGFR is 12, reports she has had discussion with her nephrologist about dialysis but has not started receiving treatments. Plan for admission to hospital for further management, patient aware of and agreeable to plan of care. MD Sanches aware of and in agreement with plan of care. Kimani Moya, EM NP Fellow: Patient to be admitted to Dr Banegas. Nephrology paged, awaiting call back. Kimani Moya, EM NP Fellow: Patient to be admitted to Dr Banegas. Nephrology paged, Dr Camargo aware of patient, to consult on her during visit.

## 2025-01-04 NOTE — PATIENT PROFILE ADULT - FALL HARM RISK - UNIVERSAL INTERVENTIONS
Bed in lowest position, wheels locked, appropriate side rails in place/Call bell, personal items and telephone in reach/Instruct patient to call for assistance before getting out of bed or chair/Non-slip footwear when patient is out of bed/[place] to call system/Physically safe environment - no spills, clutter or unnecessary equipment/Purposeful Proactive Rounding/Room/bathroom lighting operational, light cord in reach

## 2025-01-04 NOTE — H&P ADULT - ASSESSMENT
23 yo female PMH DM(Type I), CKD V,  HTN  presents with c/o 3 days of generalized abdominal pain, nausea, diarrhea. Patient also c/o midsternal CP and SOB.  Patient denies fevers, cough, dysuria. Patient reports she has been using normal insulin amount during illness.  on arrival to ER. Has CKD V at baseline, was told may   need to start HD sessions in near future.        Plan:  Admit to tele for nausea, decreased PO intake. Has CKD V at baseline, likely has early signs of uremia. No asterixis on exam.  Appreciate renal consult.     IVF Normal Saline started, follow lytes in AM. Glucose elevated 302, will start Lantus 20/Admelog 6 units tid, A1C in AM, finger sticks every 4 hours for 24 hours.     Beta-hydroxybuterate ordered as well, r/o DKA. Normal anion gap on arrival 16. CXR clear. Phos elevated 6.4, will start Renvella 800 mg tid.     Creatinine 10.0 on arrival, agree renal sonogram to confirm MRD. HGB acceptable at 10.4, CXR is clear.     Will continue home meds: Cozaar 100 mg/day, Procardia 90 mg/day and will add Lopressor 25 mg bid.      23 yo female PMH DM(Type I), CKD V,  HTN  presents with c/o 3 days of generalized abdominal pain, nausea, diarrhea. Patient also c/o midsternal CP and SOB.  Patient denies fevers, cough, dysuria. Patient reports she has been using normal insulin amount during illness.  on arrival to ER. Has CKD V at baseline, was told may   need to start HD sessions in near future.        Plan:  Admit to tele for nausea, decreased PO intake. Has CKD V at baseline, likely has early signs of uremia. No asterixis on exam.  Appreciate renal consult.     IVF Normal Saline started, follow lytes in AM. Glucose elevated 302, will start Lantus 20/Admelog 6 units tid, A1C in AM, finger sticks every 4 hours for 24 hours.     Beta-hydroxybuterate ordered as well, r/o DKA. Normal anion gap on arrival 16. CXR clear. Phos elevated 6.4, will start Renvella 800 mg tid.     Creatinine 10.0 on arrival, agree renal sonogram to confirm MRD. HGB acceptable at 10.4, CXR is clear.     Will continue home meds: Cozaar 100 mg/day, Procardia 90 mg/day and will add Lopressor 25 mg bid.         Will repeat lytes 8 PM this evening and in AM.

## 2025-01-04 NOTE — ED PROVIDER NOTE - NSICDXPASTMEDICALHX_GEN_ALL_CORE_FT
PAST MEDICAL HISTORY:  Chronic kidney disease, unspecified CKD stage     DM (diabetes mellitus), type 1     HTN (hypertension)

## 2025-01-04 NOTE — ED PROVIDER NOTE - CLINICAL SUMMARY MEDICAL DECISION MAKING FREE TEXT BOX
Kimani Moya, EM NP Fellow: Patient is Patient is 23yo F PMHx DM1, CKD, HTN  presents with c/o 3 days of generalized abdominal pain, nausea, diarrhea. Patient also c/o midsternal CP and SOB.  Patient denies fevers, cough, congestion, HA, dizziness, dysuria, back pain, leg pain. Patient reports she has been using normal insulin amount during illness.  on arrival to ER.  Patient is non-toxic but uncomfortable appearing. Physical exam as above, pertinent for abdomen is soft, non-distended, mild epigastric TTP, No RUQ/RLQ/LUQ/LLQ, negative Mantilla's sign.   ddx concerning for but not limited to DKA vs gastroenteritis vs viral syndrome vs dehydration. Less concern for acute abdominal pathology per above stated exam.   will order ekg, labs, zofran, IVF and reassess.  disposition dc home vs admit to hospital pending results and reassessment.

## 2025-01-04 NOTE — CONSULT NOTE ADULT - SUBJECTIVE AND OBJECTIVE BOX
NEPHROLOGY CONSULTATION    CHIEF COMPLAINT:  KIKE/CKD    HPI:  Presents with 3 days of nausea and some vomiting and diarrhea as well as generalized pains.  Sugar was 300.  She doesn't think she's in DKA and has not missed insulin though she did recently switch from pump to injections.  She has taken about 4 Advil over the last 2 days.  She follows with nephro in [place], last saw in November, and states GFR was 12 and they were talking about fistula placement.   Labs now reflect an acute worsening of her stage 5 CKD.      PAST MEDICAL & SURGICAL HISTORY:  DM (diabetes mellitus), type 1  Chronic kidney disease, unspecified CKD stage  HTN (hypertension)      Home Medications:  Nifedipine, losartan, insulin, Advil      MEDICATIONS  (STANDING):  pantoprazole    Tablet 40 milliGRAM(s) Oral before breakfast  sevelamer carbonate 800 milliGRAM(s) Oral three times a day      PHYSICAL EXAMINATION:  /99    Conversant, no apparent distress  PERRLA, pink conjunctivae, no ptosis  Good dentition, no pharyngeal erythema  Neck non tender, no mass, no thyromegaly or nodules  Normal respiratory effort, lungs clear to auscultation  Heart with RRR, no murmurs or rubs, mild non pitting peripheral edema  Abdomen soft, no masses, no organomegaly  Skin no rashes, ulcers or lesions, normal turgor and temperature  Appropriate affect, AO x 3  No asterixis, myoclonus or uremic fetor    LABS:                        10.4   9.27  )-----------( 358      ( 04 Jan 2025 11:00 )             29.9     01-04    124[L]  |  91[L]  |  99[H]  ----------------------------<  211[H]  3.3[L]   |  17[L]  |  10.60[H]    Ca    7.6[L]      04 Jan 2025 11:00  Phos  6.4     01-04  Mg     1.8     01-04    TPro  7.2  /  Alb  2.5[L]  /  TBili  0.5  /  DBili  x   /  AST  35  /  ALT  28  /  AlkPhos  143[H]  01-04        RADIOLOGY:  Chest X-Ray personally reviewed and shows NAPD      ASSESSMENT:  1.  KIKE on CKD(5) - prerenal azotemia, ATN vs rapid progression of diabetic nephropathy  2.  GI symptoms (nausea, vomiting, diarrhea, abdominal pain) may be due to intercurrent infection vs uremia  3.  Hyponatremia, mild, partially pseudo, Na corrects to 127 meq/L  4.  Metabolic acidosis with AG 16 - may be DKA vs uremic acidosis    PLAN:  Hydrate with NS + 20 meq/L KCL at 100 mL/hr  Recheck BMP in AM and daily  Check urine electrolytes and renal sonogram  Check for ketoacidosis (beta hydroxybutyrate)  If renal function recovers over coming days to GFR 12 may avoid dialysis in short term  Otherwise will require dialytic initiation on this admission

## 2025-01-04 NOTE — ED PROVIDER NOTE - CARE PLAN
Principal Discharge DX:	Abdominal pain   1 Principal Discharge DX:	Hyponatremia  Secondary Diagnosis:	Nausea & vomiting

## 2025-01-04 NOTE — H&P ADULT - HISTORY OF PRESENT ILLNESS
23 yo female PMH DM(Type I), CKD V,  HTN  presents with c/o 3 days of generalized abdominal pain, nausea, diarrhea. Patient also c/o midsternal CP and SOB.  Patient denies fevers, cough, dysuria. Patient reports she has been using normal insulin amount during illness.  on arrival to ER. Has CKD V at baseline, was told may   need to start HD sessions.  23 yo female PMH DM(Type I), CKD V,  HTN  presents with c/o 3 days of generalized abdominal pain, nausea, diarrhea. Patient also c/o midsternal CP and SOB.  Patient denies fevers, cough, dysuria. Patient reports she has been using normal insulin amount during illness.  on arrival to ER. Has CKD V at baseline, was told may   need to start HD sessions in near future.

## 2025-01-04 NOTE — ED PROVIDER NOTE - INTERPRETATION
Silver Nitrate Text: The wound bed was treated with silver nitrate after the biopsy was performed. Dressing: bandage Bill For Surgical Tray: no Size Of Lesion In Cm: 1.2 Additional Anesthesia Volume In Cc (Will Not Render If 0): 0 Post-Care Instructions: I reviewed with the patient in detail post-care instructions. Patient is to keep the biopsy site bandaged overnight, and then wash once daily with soap and water and then apply a thin layer of petrolatum once daily until healed. Curettage Text: The wound bed was treated with curettage after the biopsy was performed. Electrodesiccation Text: The wound bed was treated with electrodesiccation after the biopsy was performed. Billing Type: Third-Party Bill Detail Level: Detailed Cryotherapy Text: The wound bed was treated with cryotherapy after the biopsy was performed. Consent: Written consent was obtained and risks were reviewed including but not limited to scarring, infection, bleeding, scabbing, incomplete removal, nerve damage and allergy to anesthesia. Anesthesia Type: 1% lidocaine with epinephrine Biopsy Type: H and E Type Of Destruction Used: Curettage Hemostasis: Aluminum Chloride and Electrocautery Lab Facility:  Wound Care: Petrolatum Notification Instructions: Patient will be notified of biopsy results. However, patient instructed to call the office if not contacted within 2 weeks. Electrodesiccation And Curettage Text: The wound bed was treated with electrodesiccation and curettage after the biopsy was performed. Was A Bandage Applied: Yes Lab: [id number] abnormal

## 2025-01-04 NOTE — ED ADULT NURSE NOTE - OBJECTIVE STATEMENT
Patient c/o epigastric pain, N/V X3days. Type 1 diabetic, reports BGL in high 200's recently,  in triage.

## 2025-01-04 NOTE — H&P ADULT - NSHPLABSRESULTS_GEN_ALL_CORE
LABS:                        10.4   9.27  )-----------( 358      ( 04 Jan 2025 11:00 )             29.9     01-04    124[L]  |  91[L]  |  99[H]  ----------------------------<  211[H]  3.3[L]   |  17[L]  |  10.60[H]    Ca    7.6[L]      04 Jan 2025 11:00  Phos  6.4     01-04  Mg     1.8     01-04    TPro  7.2  /  Alb  2.5[L]  /  TBili  0.5  /  DBili  x   /  AST  35  /  ALT  28  /  AlkPhos  143[H]  01-04      Urinalysis Basic - ( 04 Jan 2025 11:00 )    Color: x / Appearance: x / SG: x / pH: x  Gluc: 211 mg/dL / Ketone: x  / Bili: x / Urobili: x   Blood: x / Protein: x / Nitrite: x   Leuk Esterase: x / RBC: x / WBC x   Sq Epi: x / Non Sq Epi: x / Bacteria: x          RADIOLOGY & ADDITIONAL TESTS:

## 2025-01-04 NOTE — ED ADULT TRIAGE NOTE - CHIEF COMPLAINT QUOTE
Patient presents to ED c/o constant chest pain, intermittent generalized abdominal pain, nausea, and vomiting x 3 days. BGL in triage 302   hx DM, kidney disease

## 2025-01-04 NOTE — ED PROVIDER NOTE - CRITICAL CARE ATTENDING CONTRIBUTION TO CARE
25 y/o F hx of type 1 DM, CKD, HTN presents w/ abdominal pain. endorsing epigastric in nature. also endorsing nausea/vomiting, non-bloody. endorsing diarrhea, non-bloody. 3 days of symptoms per patient. on insulin at home and complaint per patient. denies fever/chills. denies trauma. was told in past by her nephrologist she may need dialysis in the future but they did not come to a plan yet.   abdomen is benign.   overall well appearing not in acute distress.   check labs , EKG   r/o lyte abn secondary to volume loss.  consider viral syndrome/ gastritis/ pancreatitis    creatinine elevated significantly. hyponatremic. anion gap is not elevated. symptomatic control ordered.   nephro consulted given significant lab findings.   bicarb 17 / hyponatremic/hypokalemic . elevated troponin likely demand, no stemi.   admit to medicine service.     Upon my evaluation, this patient had a high probability of imminent or life-threatening deterioration due to hyponatremia, hypokalemic, low bicarbonate, which required my direct attention, intervention, and personal management.  The patient has a  medical condition that impairs one or more vital organ systems.  Frequent personal assessment and adjustment of medical interventions was performed.      I have personally provided 30 minutes of critical care time exclusive of time spent on separately billable procedures. Time includes review of laboratory data, radiology results, discussion with consultants, patient and family; monitoring for potential decompensation, as well as time spent retrieving data and reviewing the chart and documenting the visit. Interventions were performed as documented above.

## 2025-01-04 NOTE — PATIENT PROFILE ADULT - FUNCTIONAL ASSESSMENT - DAILY ACTIVITY SCORE.
Quality 226: Preventive Care And Screening: Tobacco Use: Screening And Cessation Intervention: Patient screened for tobacco use and is an ex/non-smoker Quality 431: Preventive Care And Screening: Unhealthy Alcohol Use - Screening: Documentation of medical reason(s) for not screening for unhealthy alcohol use (eg, limited life expectancy, other medical reasons) Quality 130: Documentation Of Current Medications In The Medical Record: Current Medications Documented Quality 131: Pain Assessment And Follow-Up: Pain assessment documented as positive using a standardized tool AND a follow-up plan is documented Detail Level: Detailed Quality 402: Tobacco Use And Help With Quitting Among Adolescents: Patient screened for tobacco and is an ex-smoker Quality 110: Preventive Care And Screening: Influenza Immunization: Influenza Immunization Administered during Influenza season Quality 111:Pneumonia Vaccination Status For Older Adults: Pneumococcal Vaccination not Administered or Previously Received, Reason not Otherwise Specified 24

## 2025-01-04 NOTE — ED ADULT NURSE REASSESSMENT NOTE - NS ED NURSE REASSESS COMMENT FT1
Patient awake and alert, comfort measures provided, labs sent, maintaince fluids infusing. Awaiting inpatient bed assignment.

## 2025-01-05 LAB
A1C WITH ESTIMATED AVERAGE GLUCOSE RESULT: 8.3 % — HIGH (ref 4–5.6)
ANION GAP SERPL CALC-SCNC: 12 MMOL/L — SIGNIFICANT CHANGE UP (ref 5–17)
B-OH-BUTYR SERPL-SCNC: 0.5 MMOL/L — HIGH
BUN SERPL-MCNC: 102 MG/DL — HIGH (ref 7–23)
CALCIUM SERPL-MCNC: 6.5 MG/DL — CRITICAL LOW (ref 8.5–10.1)
CHLORIDE SERPL-SCNC: 104 MMOL/L — SIGNIFICANT CHANGE UP (ref 96–108)
CHLORIDE UR-SCNC: <20 MMOL/L — SIGNIFICANT CHANGE UP
CHOLEST SERPL-MCNC: 194 MG/DL — SIGNIFICANT CHANGE UP
CO2 SERPL-SCNC: 17 MMOL/L — LOW (ref 22–31)
CREAT ?TM UR-MCNC: 47 MG/DL — SIGNIFICANT CHANGE UP
CREAT SERPL-MCNC: 10.4 MG/DL — HIGH (ref 0.5–1.3)
EGFR: 5 ML/MIN/1.73M2 — LOW
ESTIMATED AVERAGE GLUCOSE: 192 MG/DL — HIGH (ref 68–114)
GLUCOSE BLDC GLUCOMTR-MCNC: 151 MG/DL — HIGH (ref 70–99)
GLUCOSE BLDC GLUCOMTR-MCNC: 207 MG/DL — HIGH (ref 70–99)
GLUCOSE BLDC GLUCOMTR-MCNC: 209 MG/DL — HIGH (ref 70–99)
GLUCOSE BLDC GLUCOMTR-MCNC: 241 MG/DL — HIGH (ref 70–99)
GLUCOSE BLDC GLUCOMTR-MCNC: 52 MG/DL — CRITICAL LOW (ref 70–99)
GLUCOSE BLDC GLUCOMTR-MCNC: 53 MG/DL — CRITICAL LOW (ref 70–99)
GLUCOSE BLDC GLUCOMTR-MCNC: 61 MG/DL — LOW (ref 70–99)
GLUCOSE BLDC GLUCOMTR-MCNC: 87 MG/DL — SIGNIFICANT CHANGE UP (ref 70–99)
GLUCOSE BLDC GLUCOMTR-MCNC: 88 MG/DL — SIGNIFICANT CHANGE UP (ref 70–99)
GLUCOSE BLDC GLUCOMTR-MCNC: 93 MG/DL — SIGNIFICANT CHANGE UP (ref 70–99)
GLUCOSE BLDC GLUCOMTR-MCNC: 99 MG/DL — SIGNIFICANT CHANGE UP (ref 70–99)
GLUCOSE SERPL-MCNC: 87 MG/DL — SIGNIFICANT CHANGE UP (ref 70–99)
HCT VFR BLD CALC: 22.9 % — LOW (ref 34.5–45)
HDLC SERPL-MCNC: 42 MG/DL — LOW
HGB BLD-MCNC: 7.6 G/DL — LOW (ref 11.5–15.5)
LIPID PNL WITH DIRECT LDL SERPL: 118 MG/DL — HIGH
MAGNESIUM SERPL-MCNC: 1.9 MG/DL — SIGNIFICANT CHANGE UP (ref 1.6–2.6)
MCHC RBC-ENTMCNC: 29.6 PG — SIGNIFICANT CHANGE UP (ref 27–34)
MCHC RBC-ENTMCNC: 33.2 G/DL — SIGNIFICANT CHANGE UP (ref 32–36)
MCV RBC AUTO: 89.1 FL — SIGNIFICANT CHANGE UP (ref 80–100)
NON HDL CHOLESTEROL: 153 MG/DL — HIGH
NRBC # BLD: 0 /100 WBCS — SIGNIFICANT CHANGE UP (ref 0–0)
PLATELET # BLD AUTO: 346 K/UL — SIGNIFICANT CHANGE UP (ref 150–400)
POTASSIUM SERPL-MCNC: 3.9 MMOL/L — SIGNIFICANT CHANGE UP (ref 3.5–5.3)
POTASSIUM SERPL-SCNC: 3.9 MMOL/L — SIGNIFICANT CHANGE UP (ref 3.5–5.3)
PROT ?TM UR-MCNC: 764 MG/DL — HIGH (ref 0–12)
PROT/CREAT UR-RTO: 16.4 RATIO — HIGH (ref 0–0.2)
RBC # BLD: 2.57 M/UL — LOW (ref 3.8–5.2)
RBC # FLD: 12.2 % — SIGNIFICANT CHANGE UP (ref 10.3–14.5)
SODIUM SERPL-SCNC: 133 MMOL/L — LOW (ref 135–145)
SODIUM UR-SCNC: <20 MMOL/L — SIGNIFICANT CHANGE UP
TRIGL SERPL-MCNC: 197 MG/DL — HIGH
WBC # BLD: 8.02 K/UL — SIGNIFICANT CHANGE UP (ref 3.8–10.5)
WBC # FLD AUTO: 8.02 K/UL — SIGNIFICANT CHANGE UP (ref 3.8–10.5)

## 2025-01-05 PROCEDURE — 99232 SBSQ HOSP IP/OBS MODERATE 35: CPT

## 2025-01-05 RX ORDER — CALCITRIOL 0.5 UG/1
0.25 CAPSULE, LIQUID FILLED ORAL DAILY
Refills: 0 | Status: DISCONTINUED | OUTPATIENT
Start: 2025-01-05 | End: 2025-01-13

## 2025-01-05 RX ORDER — SODIUM CHLORIDE 9 MG/ML
1000 INJECTION, SOLUTION INTRAMUSCULAR; INTRAVENOUS; SUBCUTANEOUS
Refills: 0 | Status: DISCONTINUED | OUTPATIENT
Start: 2025-01-05 | End: 2025-01-05

## 2025-01-05 RX ORDER — SODIUM CHLORIDE 9 MG/ML
1000 INJECTION, SOLUTION INTRAVENOUS
Refills: 0 | Status: DISCONTINUED | OUTPATIENT
Start: 2025-01-05 | End: 2025-01-05

## 2025-01-05 RX ORDER — DEXTROSE MONOHYDRATE 25 G/50ML
15 INJECTION, SOLUTION INTRAVENOUS ONCE
Refills: 0 | Status: COMPLETED | OUTPATIENT
Start: 2025-01-05 | End: 2025-01-05

## 2025-01-05 RX ORDER — INSULIN GLARGINE-YFGN 100 [IU]/ML
12 INJECTION, SOLUTION SUBCUTANEOUS AT BEDTIME
Refills: 0 | Status: DISCONTINUED | OUTPATIENT
Start: 2025-01-05 | End: 2025-01-07

## 2025-01-05 RX ORDER — CALCIUM ACETATE 667 MG/1
667 CAPSULE ORAL
Refills: 0 | Status: DISCONTINUED | OUTPATIENT
Start: 2025-01-05 | End: 2025-01-13

## 2025-01-05 RX ORDER — INSULIN LISPRO 100/ML
4 VIAL (ML) SUBCUTANEOUS
Refills: 0 | Status: DISCONTINUED | OUTPATIENT
Start: 2025-01-05 | End: 2025-01-07

## 2025-01-05 RX ORDER — SODIUM BICARBONATE 84 MG/ML
650 INJECTION, SOLUTION INTRAVENOUS THREE TIMES A DAY
Refills: 0 | Status: DISCONTINUED | OUTPATIENT
Start: 2025-01-05 | End: 2025-01-08

## 2025-01-05 RX ORDER — LOSARTAN POTASSIUM 100 MG/1
100 TABLET, FILM COATED ORAL DAILY
Refills: 0 | Status: DISCONTINUED | OUTPATIENT
Start: 2025-01-05 | End: 2025-01-05

## 2025-01-05 RX ORDER — SODIUM CHLORIDE 9 MG/ML
1000 INJECTION, SOLUTION INTRAVENOUS
Refills: 0 | Status: DISCONTINUED | OUTPATIENT
Start: 2025-01-05 | End: 2025-01-06

## 2025-01-05 RX ORDER — INFLUENZA A VIRUS A/WISCONSIN/588/2019 (H1N1) RECOMBINANT HEMAGGLUTININ ANTIGEN, INFLUENZA A VIRUS A/DARWIN/6/2021 (H3N2) RECOMBINANT HEMAGGLUTININ ANTIGEN, INFLUENZA B VIRUS B/AUSTRIA/1359417/2021 RECOMBINANT HEMAGGLUTININ ANTIGEN, AND INFLUENZA B VIRUS B/PHUKET/3073/2013 RECOMBINANT HEMAGGLUTININ ANTIGEN 45; 45; 45; 45 UG/.5ML; UG/.5ML; UG/.5ML; UG/.5ML
0.5 INJECTION INTRAMUSCULAR ONCE
Refills: 0 | Status: DISCONTINUED | OUTPATIENT
Start: 2025-01-05 | End: 2025-01-13

## 2025-01-05 RX ADMIN — CALCITRIOL 0.25 MICROGRAM(S): 0.5 CAPSULE, LIQUID FILLED ORAL at 17:59

## 2025-01-05 RX ADMIN — CALCIUM ACETATE 667 MILLIGRAM(S): 667 CAPSULE ORAL at 09:35

## 2025-01-05 RX ADMIN — CALCIUM ACETATE 667 MILLIGRAM(S): 667 CAPSULE ORAL at 12:05

## 2025-01-05 RX ADMIN — Medication 4: at 12:07

## 2025-01-05 RX ADMIN — SODIUM CHLORIDE, SODIUM GLUCONATE, SODIUM ACETATE, POTASSIUM CHLORIDE AND MAGNESIUM CHLORIDE 100 MILLILITER(S): 30; 37; 368; 526; 502 INJECTION, SOLUTION INTRAVENOUS at 05:26

## 2025-01-05 RX ADMIN — SODIUM BICARBONATE 650 MILLIGRAM(S): 84 INJECTION, SOLUTION INTRAVENOUS at 21:53

## 2025-01-05 RX ADMIN — INSULIN GLARGINE-YFGN 12 UNIT(S): 100 INJECTION, SOLUTION SUBCUTANEOUS at 21:53

## 2025-01-05 RX ADMIN — SEVELAMER CARBONATE 800 MILLIGRAM(S): 800 TABLET, FILM COATED ORAL at 05:26

## 2025-01-05 RX ADMIN — SEVELAMER CARBONATE 800 MILLIGRAM(S): 800 TABLET, FILM COATED ORAL at 14:20

## 2025-01-05 RX ADMIN — DEXTROSE MONOHYDRATE 15 GRAM(S): 25 INJECTION, SOLUTION INTRAVENOUS at 08:42

## 2025-01-05 RX ADMIN — PANTOPRAZOLE 40 MILLIGRAM(S): 40 TABLET, DELAYED RELEASE ORAL at 09:35

## 2025-01-05 RX ADMIN — Medication 4 UNIT(S): at 12:08

## 2025-01-05 RX ADMIN — POTASSIUM CHLORIDE 40 MILLIEQUIVALENT(S): 600 TABLET, FILM COATED, EXTENDED RELEASE ORAL at 01:55

## 2025-01-05 RX ADMIN — CALCIUM ACETATE 667 MILLIGRAM(S): 667 CAPSULE ORAL at 17:59

## 2025-01-05 RX ADMIN — Medication 25 MILLIGRAM(S): at 17:59

## 2025-01-05 NOTE — PROVIDER CONTACT NOTE (CRITICAL VALUE NOTIFICATION) - DATE AND TIME:
05-Jan-2025 08:08 11/13/2023 10:00 AM Aamir Franklin, PTA [facility]   11/15/2023 10:00 AM Margy Crotes, PT [facility]   11/17/2023 10:00 AM Neyda Navarro, PTA [facility]

## 2025-01-05 NOTE — PROGRESS NOTE ADULT - SUBJECTIVE AND OBJECTIVE BOX
INTERVAL HPI/OVERNIGHT EVENTS:  Pt seen and examined at bedside.     Allergies/Intolerance: No Known Allergies      MEDICATIONS  (STANDING):  calcium acetate 667 milliGRAM(s) Oral three times a day with meals  dextrose 5%. 1000 milliLiter(s) (100 mL/Hr) IV Continuous <Continuous>  dextrose 5%. 1000 milliLiter(s) (50 mL/Hr) IV Continuous <Continuous>  dextrose 50% Injectable 25 Gram(s) IV Push once  dextrose 50% Injectable 12.5 Gram(s) IV Push once  dextrose 50% Injectable 25 Gram(s) IV Push once  glucagon  Injectable 1 milliGRAM(s) IntraMuscular once  influenza   Vaccine 0.5 milliLiter(s) IntraMuscular once  insulin glargine Injectable (LANTUS) 20 Unit(s) SubCutaneous at bedtime  insulin lispro (ADMELOG) corrective regimen sliding scale   SubCutaneous three times a day before meals  insulin lispro Injectable (ADMELOG) 6 Unit(s) SubCutaneous three times a day before meals  losartan 100 milliGRAM(s) Oral daily  metoprolol tartrate 25 milliGRAM(s) Oral two times a day  NIFEdipine XL 90 milliGRAM(s) Oral daily  pantoprazole    Tablet 40 milliGRAM(s) Oral before breakfast  sevelamer carbonate 800 milliGRAM(s) Oral three times a day  sodium chloride 0.9% with potassium chloride 20 mEq/L 1000 milliLiter(s) (100 mL/Hr) IV Continuous <Continuous>    MEDICATIONS  (PRN):  dextrose Oral Gel 15 Gram(s) Oral once PRN Blood Glucose LESS THAN 70 milliGRAM(s)/deciliter  ondansetron Injectable 4 milliGRAM(s) IV Push every 6 hours PRN Nausea and/or Vomiting        ROS: all systems reviewed and wnl      PHYSICAL EXAMINATION:  Vital Signs Last 24 Hrs  T(C): 36.9 (05 Jan 2025 05:19), Max: 37.2 (04 Jan 2025 16:25)  T(F): 98.4 (05 Jan 2025 05:19), Max: 98.9 (04 Jan 2025 16:25)  HR: 88 (05 Jan 2025 05:19) (87 - 109)  BP: 100/67 (05 Jan 2025 05:19) (92/60 - 154/96)  BP(mean): --  RR: 18 (05 Jan 2025 05:19) (18 - 18)  SpO2: 99% (05 Jan 2025 05:19) (98% - 100%)    Parameters below as of 04 Jan 2025 19:26  Patient On (Oxygen Delivery Method): room air      CAPILLARY BLOOD GLUCOSE      POCT Blood Glucose.: 93 mg/dL (05 Jan 2025 09:09)  POCT Blood Glucose.: 61 mg/dL (05 Jan 2025 08:39)  POCT Blood Glucose.: 52 mg/dL (05 Jan 2025 08:20)  POCT Blood Glucose.: 53 mg/dL (05 Jan 2025 08:16)  POCT Blood Glucose.: 151 mg/dL (05 Jan 2025 04:41)  POCT Blood Glucose.: 209 mg/dL (05 Jan 2025 00:49)  POCT Blood Glucose.: 106 mg/dL (04 Jan 2025 21:23)  POCT Blood Glucose.: 92 mg/dL (04 Jan 2025 18:49)      01-04 @ 07:01  -  01-05 @ 07:00  --------------------------------------------------------  IN: 1300 mL / OUT: 0 mL / NET: 1300 mL        GENERAL: stable, comfortable on RA, poor PO intake  NECK: supple, No JVD  CHEST/LUNG: clear to auscultation bilaterally; no rales, rhonchi, or wheezing b/l  HEART: normal S1, S2  ABDOMEN: BS+, soft, ND, NT   EXTREMITIES:  pulses palpable; no clubbing, cyanosis, or edema b/l LEs    LABS:                        7.6    8.02  )-----------( 346      ( 05 Jan 2025 06:05 )             22.9     01-05    133[L]  |  104  |  102[H]  ----------------------------<  87  3.9   |  17[L]  |  10.40[H]    Ca    6.5[LL]      05 Jan 2025 06:05  Phos  6.4     01-04  Mg     1.9     01-05    TPro  5.6[L]  /  Alb  1.9[L]  /  TBili  0.3  /  DBili  x   /  AST  22  /  ALT  22  /  AlkPhos  113  01-04      Urinalysis Basic - ( 05 Jan 2025 06:05 )    Color: x / Appearance: x / SG: x / pH: x  Gluc: 87 mg/dL / Ketone: x  / Bili: x / Urobili: x   Blood: x / Protein: x / Nitrite: x   Leuk Esterase: x / RBC: x / WBC x   Sq Epi: x / Non Sq Epi: x / Bacteria: x

## 2025-01-05 NOTE — PROGRESS NOTE ADULT - ASSESSMENT
25 yo female PMH DM(Type I), CKD V,  HTN  presents with c/o 3 days of generalized abdominal pain, nausea, diarrhea. Patient also c/o midsternal CP and SOB.  Patient denies fevers, cough, dysuria. Patient reports she has been using normal insulin amount during illness.  on arrival to ER. Has CKD V at baseline, was told may   need to start HD sessions in near future.        Plan:  Admit to tele for nausea, decreased PO intake. Has CKD V at baseline, likely has early signs of uremia. No asterixis on exam.  Appreciate renal consult.     IVF Normal Saline started, follow lytes in AM. Glucose elevated 302, will start Lantus 20/Admelog 6 units tid, A1C in AM, finger sticks every 4 hours for 24 hours.     Beta-hydroxybuterate ordered as well, r/o DKA. Normal anion gap on arrival 16. CXR clear. Phos elevated 6.4, will start Renvella 800 mg tid.     Creatinine 10.0 on arrival, agree renal sonogram to confirm MRD. HGB acceptable at 10.4, CXR is clear.     Will continue home meds: Cozaar 100 mg/day, Procardia 90 mg/day and will add Lopressor 25 mg bid.         Will repeat lytes 8 PM this evening and in AM.  23 yo female PMH DM(Type I), CKD V,  HTN  presents with c/o 3 days of generalized abdominal pain, nausea, diarrhea. Patient also c/o midsternal CP and SOB.  Patient denies fevers, cough, dysuria. Patient reports she has been using normal insulin amount during illness.  on arrival to ER. Has CKD V at baseline, was told may   need to start HD sessions in near future.        Plan:  Admit to tele for nausea, decreased PO intake. Has CKD V at baseline, likely has early signs of uremia. No asterixis on exam.  Appreciate renal consult.     IVF Normal Saline started, follow lytes in AM. Glucose today , decrease Lantus 12, Admelog 4 units tid, A1C in AM, finger sticks every 4 hours for 24 hours.     Beta-hydroxybuterate ordered as well, r/o DKA. Normal anion gap on arrival 16. CXR clear. Phos elevated 6.4, will start Renvella 800 mg tid.     Creatinine 10.0 on arrival, agree renal sonogram to confirm MRD. HGB acceptable at 10.4, CXR is clear.     Will continue home meds: Cozaar 100 mg/day, stop Procardia BP low, will add Lopressor 25 mg bid.         Renal sono notes MRD, large protinuria noted.  25 yo female PMH DM(Type I), CKD V,  HTN  presents with c/o 3 days of generalized abdominal pain, nausea, diarrhea. Patient also c/o midsternal CP and SOB.  Patient denies fevers, cough, dysuria. Patient reports she has been using normal insulin amount during illness.  on arrival to ER. Has CKD V at baseline, was told may   need to start HD sessions in near future.        Plan:  Admit to tele for nausea, decreased PO intake. Has CKD V at baseline, likely has early signs of uremia. No asterixis on exam.  Appreciate renal consult.     IVF Normal Saline started, follow lytes in AM. Glucose today , decrease Lantus 12, Admelog 4 units tid, A1C in AM, finger sticks every 4 hours for 24 hours.     A1C is 8.3, average control.     Beta-hydroxybuterate .50, mild elevation, anion gap normal. No DKA. Phos elevated 6.4, will start Phoslo tid.     Creatinine 10.0 on arrival, agree renal sonogram to confirm MRD. HGB acceptable at 10.4, CXR is clear.     Will continue home meds: Cozaar 100 mg/day, stop Procardia BP low, will add Lopressor 25 mg bid.         Renal sono notes MRD, large protinuria noted.     Pt to decide in AM if tolerates meals well and wants to follow up with outside Nephrologist for eventual HD.

## 2025-01-05 NOTE — PROVIDER CONTACT NOTE (CRITICAL VALUE NOTIFICATION) - ASSESSMENT
VSS, NAD noted, no acute events this tour.
Patient is alert and oriented x4. On room air. No s/s of any distress

## 2025-01-05 NOTE — PROGRESS NOTE ADULT - SUBJECTIVE AND OBJECTIVE BOX
NEPHROLOGY PROGRESS NOTE    CHIEF COMPLAINT:  KIKE/CKD    HPI:  Feeling better overall.  Making urine.  Denies nausea, vomiting or abdominal pain.  Denies sob.  Renal function no better.    EXAM:  Vital Signs Last 24 Hrs  T(C): 36.8 (05 Jan 2025 10:58), Max: 37.2 (04 Jan 2025 16:25)  T(F): 98.3 (05 Jan 2025 10:58), Max: 98.9 (04 Jan 2025 16:25)  HR: 100 (05 Jan 2025 10:58) (87 - 101)  BP: 107/72 (05 Jan 2025 10:58) (92/60 - 154/96)  BP(mean): --  RR: 17 (05 Jan 2025 10:58) (17 - 18)  SpO2: 100% (05 Jan 2025 10:58) (98% - 100%)    Parameters below as of 05 Jan 2025 10:58  Patient On (Oxygen Delivery Method): room air      I&O's Summary    04 Jan 2025 07:01  -  05 Jan 2025 07:00  --------------------------------------------------------  IN: 1300 mL / OUT: 0 mL / NET: 1300 mL      Daily Height in cm: 157.48 (04 Jan 2025 19:26)    Daily     Conversant, in no apparent distress  Normal respiratory effort, lungs clear bilaterally  Heart RRR with no murmur or rub, no edema  No asterixis    LABS                        7.6    8.02  )-----------( 346      ( 05 Jan 2025 06:05 )             22.9     01-05    133[L]  |  104  |  102[H]  ----------------------------<  87  3.9   |  17[L]  |  10.40[H]    Ca    6.5[LL]      05 Jan 2025 06:05  Phos  6.4     01-04  Mg     1.9     01-05    TPro  5.6[L]  /  Alb  1.9[L]  /  TBili  0.3  /  DBili  x   /  AST  22  /  ALT  22  /  AlkPhos  113  01-04      Corrected calcium 8.2    Beta-hydroxybutyrate 0.5    UA > 1000 protein, mod blood, 5 WBC, 10 RBC    Urine Na/Cl < 20    Urine P/C 16.4    Radiology:  Renal sonogram shows 9 cm echogenic kidneys w/o hydronephrosis      ASSESSMENT:  1.  KIKE on CKD(5) - prerenal azotemia, ATN vs rapid progression of diabetic nephropathy with malignant proteinuria  2.  GI symptoms (nausea, vomiting, diarrhea, abdominal pain) may be due to intercurrent infection vs uremia  3.  Hyponatremia, mild, partially pseudo, Na corrects to near normal   4.  Metabolic acidosis seems more consistent with uremic rather than ketoacidosis  5.  Hypocalcemia, corrects to 8.2    PLAN:  Continue to hydrate with NS  Start calcitriol 0.25 mcg PO QD, calcium acetate 667 mg PO TID, NaHCO3 650 mg TID  If no renal functional response to volume expansion over next 24-48 hrs then dialysis will be initiated  Patient understands rationale and is willing to proceed if needed       NEPHROLOGY PROGRESS NOTE    CHIEF COMPLAINT:  KIKE/CKD    HPI:  Feeling better overall.  Making urine.  Denies nausea, vomiting or abdominal pain.  Denies sob.  Renal function no better.    EXAM:  Vital Signs Last 24 Hrs  T(C): 36.8 (05 Jan 2025 10:58), Max: 37.2 (04 Jan 2025 16:25)  T(F): 98.3 (05 Jan 2025 10:58), Max: 98.9 (04 Jan 2025 16:25)  HR: 100 (05 Jan 2025 10:58) (87 - 101)  BP: 107/72 (05 Jan 2025 10:58) (92/60 - 154/96)  BP(mean): --  RR: 17 (05 Jan 2025 10:58) (17 - 18)  SpO2: 100% (05 Jan 2025 10:58) (98% - 100%)    Parameters below as of 05 Jan 2025 10:58  Patient On (Oxygen Delivery Method): room air      I&O's Summary    04 Jan 2025 07:01  -  05 Jan 2025 07:00  --------------------------------------------------------  IN: 1300 mL / OUT: 0 mL / NET: 1300 mL      Daily Height in cm: 157.48 (04 Jan 2025 19:26)    Daily     Conversant, in no apparent distress  Normal respiratory effort, lungs clear bilaterally  Heart RRR with no murmur or rub, no edema  No asterixis    LABS                        7.6    8.02  )-----------( 346      ( 05 Jan 2025 06:05 )             22.9     01-05    133[L]  |  104  |  102[H]  ----------------------------<  87  3.9   |  17[L]  |  10.40[H]    Ca    6.5[LL]      05 Jan 2025 06:05  Phos  6.4     01-04  Mg     1.9     01-05    TPro  5.6[L]  /  Alb  1.9[L]  /  TBili  0.3  /  DBili  x   /  AST  22  /  ALT  22  /  AlkPhos  113  01-04      Corrected calcium 8.2    Beta-hydroxybutyrate 0.5    UA > 1000 protein, mod blood, 5 WBC, 10 RBC    Urine Na/Cl < 20    Urine P/C 16.4    Radiology:  Renal sonogram shows 9 cm echogenic kidneys w/o hydronephrosis      ASSESSMENT:  1.  KIKE on CKD(5) - prerenal azotemia, ATN vs rapid progression of diabetic nephropathy with malignant proteinuria  2.  GI symptoms (nausea, vomiting, diarrhea, abdominal pain) may be due to intercurrent infection vs uremia  3.  Hyponatremia, mild, partially pseudo, Na corrects to near normal   4.  Metabolic acidosis seems more consistent with uremic rather than ketoacidosis  5.  Hypocalcemia, corrects to 8.2    PLAN:  Continue to hydrate and switch to NS  Start calcitriol 0.25 mcg PO QD, calcium acetate 667 mg PO TID, NaHCO3 650 mg TID  Discontinue losartan  If no renal functional response to volume expansion over next 24-48 hrs then dialysis will be initiated  Patient understands rationale and is willing to proceed if needed       NEPHROLOGY PROGRESS NOTE    CHIEF COMPLAINT:  KIKE/CKD    HPI:  Feeling better overall.  Making urine.  Denies nausea, vomiting or abdominal pain.  Denies sob.  Renal function no better.    EXAM:  Vital Signs Last 24 Hrs  T(C): 36.8 (05 Jan 2025 10:58), Max: 37.2 (04 Jan 2025 16:25)  T(F): 98.3 (05 Jan 2025 10:58), Max: 98.9 (04 Jan 2025 16:25)  HR: 100 (05 Jan 2025 10:58) (87 - 101)  BP: 107/72 (05 Jan 2025 10:58) (92/60 - 154/96)  BP(mean): --  RR: 17 (05 Jan 2025 10:58) (17 - 18)  SpO2: 100% (05 Jan 2025 10:58) (98% - 100%)    Parameters below as of 05 Jan 2025 10:58  Patient On (Oxygen Delivery Method): room air      I&O's Summary    04 Jan 2025 07:01  -  05 Jan 2025 07:00  --------------------------------------------------------  IN: 1300 mL / OUT: 0 mL / NET: 1300 mL      Daily Height in cm: 157.48 (04 Jan 2025 19:26)    Daily     Conversant, in no apparent distress  Normal respiratory effort, lungs clear bilaterally  Heart RRR with no murmur or rub, no edema  No asterixis    LABS                        7.6    8.02  )-----------( 346      ( 05 Jan 2025 06:05 )             22.9     01-05    133[L]  |  104  |  102[H]  ----------------------------<  87  3.9   |  17[L]  |  10.40[H]    Ca    6.5[LL]      05 Jan 2025 06:05  Phos  6.4     01-04  Mg     1.9     01-05    TPro  5.6[L]  /  Alb  1.9[L]  /  TBili  0.3  /  DBili  x   /  AST  22  /  ALT  22  /  AlkPhos  113  01-04      Corrected calcium 8.2    Beta-hydroxybutyrate 0.5    UA > 1000 protein, mod blood, 5 WBC, 10 RBC    Urine Na/Cl < 20    Urine P/C 16.4    Radiology:  Renal sonogram shows 9 cm echogenic kidneys w/o hydronephrosis      ASSESSMENT:  1.  KIKE on CKD(5) - prerenal azotemia, ATN vs rapid progression of diabetic nephropathy with malignant proteinuria  2.  GI symptoms (nausea, vomiting, diarrhea, abdominal pain) may be due to intercurrent infection vs uremia  3.  Hyponatremia, mild, partially pseudo, Na corrects to near normal   4.  Metabolic acidosis seems more consistent with uremic rather than ketoacidosis  5.  Hypocalcemia, corrects to 8.2    PLAN:  Continue to hydrate and switch to NS  Start calcitriol 0.25 mcg PO QD, calcium acetate 667 mg PO TID, NaHCO3 650 mg TID  Discontinue losartan  If no renal functional response to volume expansion over next 24-48 hrs then dialysis will be initiated  Patient understands rationale and is willing to proceed if needed  Keep NPO after midnight in case we decide to place TDC tomorrow

## 2025-01-06 LAB
ANION GAP SERPL CALC-SCNC: 11 MMOL/L — SIGNIFICANT CHANGE UP (ref 5–17)
BUN SERPL-MCNC: 102 MG/DL — HIGH (ref 7–23)
CALCIUM SERPL-MCNC: 7.5 MG/DL — LOW (ref 8.5–10.1)
CHLORIDE SERPL-SCNC: 111 MMOL/L — HIGH (ref 96–108)
CO2 SERPL-SCNC: 16 MMOL/L — LOW (ref 22–31)
CREAT SERPL-MCNC: 10.8 MG/DL — HIGH (ref 0.5–1.3)
EGFR: 5 ML/MIN/1.73M2 — LOW
GLUCOSE BLDC GLUCOMTR-MCNC: 106 MG/DL — HIGH (ref 70–99)
GLUCOSE BLDC GLUCOMTR-MCNC: 233 MG/DL — HIGH (ref 70–99)
GLUCOSE BLDC GLUCOMTR-MCNC: 252 MG/DL — HIGH (ref 70–99)
GLUCOSE BLDC GLUCOMTR-MCNC: 267 MG/DL — HIGH (ref 70–99)
GLUCOSE BLDC GLUCOMTR-MCNC: 76 MG/DL — SIGNIFICANT CHANGE UP (ref 70–99)
GLUCOSE SERPL-MCNC: 80 MG/DL — SIGNIFICANT CHANGE UP (ref 70–99)
HCT VFR BLD CALC: 23.2 % — LOW (ref 34.5–45)
HGB BLD-MCNC: 7.7 G/DL — LOW (ref 11.5–15.5)
MCHC RBC-ENTMCNC: 30.1 PG — SIGNIFICANT CHANGE UP (ref 27–34)
MCHC RBC-ENTMCNC: 33.2 G/DL — SIGNIFICANT CHANGE UP (ref 32–36)
MCV RBC AUTO: 90.6 FL — SIGNIFICANT CHANGE UP (ref 80–100)
NRBC # BLD: 0 /100 WBCS — SIGNIFICANT CHANGE UP (ref 0–0)
PHOSPHATE SERPL-MCNC: 6.6 MG/DL — HIGH (ref 2.5–4.5)
PLATELET # BLD AUTO: 309 K/UL — SIGNIFICANT CHANGE UP (ref 150–400)
POTASSIUM SERPL-MCNC: 3.7 MMOL/L — SIGNIFICANT CHANGE UP (ref 3.5–5.3)
POTASSIUM SERPL-SCNC: 3.7 MMOL/L — SIGNIFICANT CHANGE UP (ref 3.5–5.3)
RBC # BLD: 2.56 M/UL — LOW (ref 3.8–5.2)
RBC # FLD: 12.4 % — SIGNIFICANT CHANGE UP (ref 10.3–14.5)
SODIUM SERPL-SCNC: 138 MMOL/L — SIGNIFICANT CHANGE UP (ref 135–145)
WBC # BLD: 8.59 K/UL — SIGNIFICANT CHANGE UP (ref 3.8–10.5)
WBC # FLD AUTO: 8.59 K/UL — SIGNIFICANT CHANGE UP (ref 3.8–10.5)

## 2025-01-06 PROCEDURE — 36000 PLACE NEEDLE IN VEIN: CPT | Mod: 59

## 2025-01-06 PROCEDURE — 76937 US GUIDE VASCULAR ACCESS: CPT | Mod: 26

## 2025-01-06 PROCEDURE — 99232 SBSQ HOSP IP/OBS MODERATE 35: CPT

## 2025-01-06 PROCEDURE — 77001 FLUOROGUIDE FOR VEIN DEVICE: CPT | Mod: 26

## 2025-01-06 PROCEDURE — 36556 INSERT NON-TUNNEL CV CATH: CPT

## 2025-01-06 PROCEDURE — 36556 INSERT NON-TUNNEL CV CATH: CPT | Mod: RT

## 2025-01-06 PROCEDURE — 76937 US GUIDE VASCULAR ACCESS: CPT | Mod: 26,59

## 2025-01-06 RX ORDER — CHLORHEXIDINE GLUCONATE 1.2 MG/ML
1 RINSE ORAL
Refills: 0 | Status: DISCONTINUED | OUTPATIENT
Start: 2025-01-06 | End: 2025-01-13

## 2025-01-06 RX ORDER — SODIUM CHLORIDE 9 MG/ML
10 INJECTION, SOLUTION INTRAMUSCULAR; INTRAVENOUS; SUBCUTANEOUS
Refills: 0 | Status: DISCONTINUED | OUTPATIENT
Start: 2025-01-06 | End: 2025-01-13

## 2025-01-06 RX ORDER — LOPERAMIDE HCL 1 MG/5 ML
2 LIQUID (ML) ORAL ONCE
Refills: 0 | Status: COMPLETED | OUTPATIENT
Start: 2025-01-06 | End: 2025-01-06

## 2025-01-06 RX ORDER — HYDRALAZINE HYDROCHLORIDE 10 MG/1
10 TABLET ORAL ONCE
Refills: 0 | Status: COMPLETED | OUTPATIENT
Start: 2025-01-06 | End: 2025-01-06

## 2025-01-06 RX ADMIN — CALCIUM ACETATE 667 MILLIGRAM(S): 667 CAPSULE ORAL at 14:47

## 2025-01-06 RX ADMIN — HYDRALAZINE HYDROCHLORIDE 10 MILLIGRAM(S): 10 TABLET ORAL at 11:38

## 2025-01-06 RX ADMIN — CALCIUM ACETATE 667 MILLIGRAM(S): 667 CAPSULE ORAL at 08:33

## 2025-01-06 RX ADMIN — INSULIN GLARGINE-YFGN 12 UNIT(S): 100 INJECTION, SOLUTION SUBCUTANEOUS at 21:30

## 2025-01-06 RX ADMIN — SODIUM BICARBONATE 650 MILLIGRAM(S): 84 INJECTION, SOLUTION INTRAVENOUS at 14:47

## 2025-01-06 RX ADMIN — Medication 25 MILLIGRAM(S): at 19:28

## 2025-01-06 RX ADMIN — Medication 2 MILLIGRAM(S): at 13:14

## 2025-01-06 RX ADMIN — CHLORHEXIDINE GLUCONATE 1 APPLICATION(S): 1.2 RINSE ORAL at 15:36

## 2025-01-06 RX ADMIN — PANTOPRAZOLE 40 MILLIGRAM(S): 40 TABLET, DELAYED RELEASE ORAL at 08:34

## 2025-01-06 RX ADMIN — Medication 25 MILLIGRAM(S): at 05:25

## 2025-01-06 RX ADMIN — Medication 4: at 19:27

## 2025-01-06 RX ADMIN — Medication 4 UNIT(S): at 19:27

## 2025-01-06 RX ADMIN — SODIUM BICARBONATE 650 MILLIGRAM(S): 84 INJECTION, SOLUTION INTRAVENOUS at 21:30

## 2025-01-06 RX ADMIN — CALCITRIOL 0.25 MICROGRAM(S): 0.5 CAPSULE, LIQUID FILLED ORAL at 15:34

## 2025-01-06 RX ADMIN — SODIUM CHLORIDE 75 MILLILITER(S): 9 INJECTION, SOLUTION INTRAVENOUS at 00:13

## 2025-01-06 RX ADMIN — CALCIUM ACETATE 667 MILLIGRAM(S): 667 CAPSULE ORAL at 19:28

## 2025-01-06 RX ADMIN — SODIUM BICARBONATE 650 MILLIGRAM(S): 84 INJECTION, SOLUTION INTRAVENOUS at 05:25

## 2025-01-06 NOTE — PROGRESS NOTE ADULT - SUBJECTIVE AND OBJECTIVE BOX
INTERVAL HPI/OVERNIGHT EVENTS:  Pt seen and examined at bedside.     Allergies/Intolerance: No Known Allergies      MEDICATIONS  (STANDING):  calcitriol   Capsule 0.25 MICROGram(s) Oral daily  calcium acetate 667 milliGRAM(s) Oral three times a day with meals  dextrose 5% + sodium chloride 0.9%. 1000 milliLiter(s) (75 mL/Hr) IV Continuous <Continuous>  dextrose 5%. 1000 milliLiter(s) (100 mL/Hr) IV Continuous <Continuous>  dextrose 5%. 1000 milliLiter(s) (50 mL/Hr) IV Continuous <Continuous>  dextrose 50% Injectable 25 Gram(s) IV Push once  dextrose 50% Injectable 12.5 Gram(s) IV Push once  dextrose 50% Injectable 25 Gram(s) IV Push once  glucagon  Injectable 1 milliGRAM(s) IntraMuscular once  influenza   Vaccine 0.5 milliLiter(s) IntraMuscular once  insulin glargine Injectable (LANTUS) 12 Unit(s) SubCutaneous at bedtime  insulin lispro (ADMELOG) corrective regimen sliding scale   SubCutaneous three times a day before meals  insulin lispro Injectable (ADMELOG) 4 Unit(s) SubCutaneous three times a day before meals  metoprolol tartrate 25 milliGRAM(s) Oral two times a day  pantoprazole    Tablet 40 milliGRAM(s) Oral before breakfast  sodium bicarbonate 650 milliGRAM(s) Oral three times a day    MEDICATIONS  (PRN):  dextrose Oral Gel 15 Gram(s) Oral once PRN Blood Glucose LESS THAN 70 milliGRAM(s)/deciliter  ondansetron Injectable 4 milliGRAM(s) IV Push every 6 hours PRN Nausea and/or Vomiting        ROS: all systems reviewed and wnl      PHYSICAL EXAMINATION:  Vital Signs Last 24 Hrs  T(C): 36.8 (06 Jan 2025 06:16), Max: 37.6 (05 Jan 2025 23:18)  T(F): 98.3 (06 Jan 2025 06:16), Max: 99.6 (05 Jan 2025 23:18)  HR: 96 (06 Jan 2025 06:16) (95 - 100)  BP: 143/94 (06 Jan 2025 06:16) (107/72 - 147/83)  BP(mean): --  RR: 18 (06 Jan 2025 06:16) (17 - 18)  SpO2: 100% (06 Jan 2025 06:16) (96% - 100%)    Parameters below as of 05 Jan 2025 10:58  Patient On (Oxygen Delivery Method): room air      CAPILLARY BLOOD GLUCOSE      POCT Blood Glucose.: 106 mg/dL (06 Jan 2025 08:02)  POCT Blood Glucose.: 99 mg/dL (05 Jan 2025 21:11)  POCT Blood Glucose.: 87 mg/dL (05 Jan 2025 17:52)  POCT Blood Glucose.: 88 mg/dL (05 Jan 2025 16:47)  POCT Blood Glucose.: 241 mg/dL (05 Jan 2025 12:06)  POCT Blood Glucose.: 207 mg/dL (05 Jan 2025 10:14)  POCT Blood Glucose.: 93 mg/dL (05 Jan 2025 09:09)  POCT Blood Glucose.: 61 mg/dL (05 Jan 2025 08:39)      01-05 @ 07:01  -  01-06 @ 07:00  --------------------------------------------------------  IN: 2085 mL / OUT: 0 mL / NET: 2085 mL        GENERAL: stable, NPO for possible perm cath today.   NECK: supple, No JVD  CHEST/LUNG: clear to auscultation bilaterally; no rales, rhonchi, or wheezing b/l  HEART: normal S1, S2  ABDOMEN: BS+, soft, ND, NT   EXTREMITIES:  pulses palpable; no clubbing, cyanosis, or edema b/l LEs    LABS:                        7.6    8.02  )-----------( 346      ( 05 Jan 2025 06:05 )             22.9     01-05    133[L]  |  104  |  102[H]  ----------------------------<  87  3.9   |  17[L]  |  10.40[H]    Ca    6.5[LL]      05 Jan 2025 06:05  Phos  6.4     01-04  Mg     1.9     01-05    TPro  5.6[L]  /  Alb  1.9[L]  /  TBili  0.3  /  DBili  x   /  AST  22  /  ALT  22  /  AlkPhos  113  01-04      Urinalysis Basic - ( 05 Jan 2025 06:05 )    Color: x / Appearance: x / SG: x / pH: x  Gluc: 87 mg/dL / Ketone: x  / Bili: x / Urobili: x   Blood: x / Protein: x / Nitrite: x   Leuk Esterase: x / RBC: x / WBC x   Sq Epi: x / Non Sq Epi: x / Bacteria: x

## 2025-01-06 NOTE — PROCEDURE NOTE - ADDITIONAL PROCEDURE DETAILS
Successful insertion of 14F 15cm nontunneled hemodialysis catheter via right internal jugular vein, using ultrasound and fluoroscopy guidance. Tip in SVC. Okay to use. No complications. Full report to follow.

## 2025-01-06 NOTE — PROGRESS NOTE ADULT - SUBJECTIVE AND OBJECTIVE BOX
[facility] NEPHROLOGY SERVICES, [facility]  NEPHROLOGY AND HYPERTENSION  [address]  [address]  [place] [address]  [phone]    MD ANTONI JEFFERSON MD YELENA ROSENBERG, MD BINNY KOSHY, MD CHRISTOPHER CAPUTO, MD EDWARD BOVER, MD          Patient events noted    MEDICATIONS  (STANDING):  calcitriol   Capsule 0.25 MICROGram(s) Oral daily  calcium acetate 667 milliGRAM(s) Oral three times a day with meals  chlorhexidine 2% Cloths 1 Application(s) Topical <User Schedule>  dextrose 5%. 1000 milliLiter(s) (100 mL/Hr) IV Continuous <Continuous>  dextrose 5%. 1000 milliLiter(s) (50 mL/Hr) IV Continuous <Continuous>  dextrose 50% Injectable 25 Gram(s) IV Push once  dextrose 50% Injectable 12.5 Gram(s) IV Push once  dextrose 50% Injectable 25 Gram(s) IV Push once  glucagon  Injectable 1 milliGRAM(s) IntraMuscular once  influenza   Vaccine 0.5 milliLiter(s) IntraMuscular once  insulin glargine Injectable (LANTUS) 12 Unit(s) SubCutaneous at bedtime  insulin lispro (ADMELOG) corrective regimen sliding scale   SubCutaneous three times a day before meals  insulin lispro Injectable (ADMELOG) 4 Unit(s) SubCutaneous three times a day before meals  metoprolol tartrate 25 milliGRAM(s) Oral two times a day  pantoprazole    Tablet 40 milliGRAM(s) Oral before breakfast  sodium bicarbonate 650 milliGRAM(s) Oral three times a day    MEDICATIONS  (PRN):  dextrose Oral Gel 15 Gram(s) Oral once PRN Blood Glucose LESS THAN 70 milliGRAM(s)/deciliter  sodium chloride 0.9% lock flush 10 milliLiter(s) IV Push every 1 hour PRN Pre/post blood products, medications, blood draw, and to maintain line patency      01-05-25 @ 07:01  -  01-06-25 @ 07:00  --------------------------------------------------------  IN: 2085 mL / OUT: 0 mL / NET: 2085 mL    01-06-25 @ 07:01  -  01-06-25 @ 22:57  --------------------------------------------------------  IN: 760 mL / OUT: 0 mL / NET: 760 mL      PHYSICAL EXAM:      T(C): 37.3 (01-06-25 @ 18:30), Max: 37.6 (01-05-25 @ 23:18)  HR: 104 (01-06-25 @ 22:08) (54 - 104)  BP: 126/84 (01-06-25 @ 22:08) (117/78 - 164/105)  RR: 18 (01-06-25 @ 22:08) (17 - 20)  SpO2: 100% (01-06-25 @ 22:08) (96% - 100%)  Wt(kg): --  Lungs clear  Heart S1S2  Abd soft NT ND  Extremities:   tr edema                                    7.7    8.59  )-----------( 309      ( 06 Jan 2025 10:07 )             23.2     01-06    138  |  111[H]  |  102[H]  ----------------------------<  80  3.7   |  16[L]  |  10.80[H]    Ca    7.5[L]      06 Jan 2025 10:07  Phos  6.6     01-06  Mg     1.9     01-05          Creatinine Trend: 10.80<--, 10.40<--, 10.40<--, 10.60<--      Assessment   CKD 5, ESRD    Plan:  Initiate HD for today   2 hours; 250/500  Perm cath Friday;   D/C IVF      Selvin Cosme MD

## 2025-01-06 NOTE — PROGRESS NOTE ADULT - ASSESSMENT
23 yo female PMH DM(Type I), CKD V,  HTN  presents with c/o 3 days of generalized abdominal pain, nausea, diarrhea. Patient also c/o midsternal CP and SOB.  Patient denies fevers, cough, dysuria. Patient reports she has been using normal insulin amount during illness.  on arrival to ER. Has CKD V at baseline, was told may   need to start HD sessions in near future.        Plan:  Admit to tele for nausea, decreased PO intake. Has CKD V at baseline, likely has early signs of uremia. No asterixis on exam.  Appreciate renal consult.     IVF Normal Saline started, follow lytes in AM. Glucose today , decrease Lantus 12, Admelog 4 units tid, A1C in AM, finger sticks every 4 hours for 24 hours.     A1C is 8.3, average control.     Beta-hydroxybuterate .50, mild elevation, anion gap normal. No DKA. Phos elevated 6.4, will start Phoslo tid.     Creatinine 10.0 on arrival, agree renal sonogram to confirm MRD. HGB acceptable at 10.4, CXR is clear.     Will continue home meds: Cozaar 100 mg/day, stop Procardia BP low, will add Lopressor 25 mg bid.         Renal sono notes MRD, large protinuria noted.     Pt to decide in AM if tolerates meals well and wants to follow up with outside Nephrologist for eventual HD.  25 yo female PMH DM(Type I), CKD V,  HTN  presents with c/o 3 days of generalized abdominal pain, nausea, diarrhea. Patient also c/o midsternal CP and SOB.  Patient denies fevers, cough, dysuria. Patient reports she has been using normal insulin amount during illness.  on arrival to ER. Has CKD V at baseline, was told may   need to start HD sessions in near future.        Plan:  Admit to tele for nausea, decreased PO intake. Has CKD V at baseline, likely has early signs of uremia. No asterixis on exam.  Appreciate renal consult.     IVF Normal Saline started, follow lytes in AM. Glucose today , decrease Lantus 12, Admelog 4 units tid, A1C in AM, finger sticks every 4 hours for 24 hours.     A1C is 8.3, average control.     Beta-hydroxybuterate .50, mild elevation, anion gap normal. No DKA. Phos elevated 6.4, will start Phoslo tid.     Creatinine 10.0 on arrival, agree renal sonogram to confirm MRD. HGB acceptable at 10.4, CXR is clear.     Will continue home meds: Cozaar 100 mg/day, stop Procardia BP low, will add Lopressor 25 mg bid.         Renal sono notes MRD, large protinuria noted.     Had discussion with Nephro attending yesterday, possible perm cath today if pt agrees.  NPO after 12 midnight.     Continue daily Lantus even if NPO, pt is Type I DM.     23 yo female PMH DM(Type I), CKD V,  HTN  presents with c/o 3 days of generalized abdominal pain, nausea, diarrhea. Patient also c/o midsternal CP and SOB.  Patient denies fevers, cough, dysuria. Patient reports she has been using normal insulin amount during illness.  on arrival to ER. Has CKD V at baseline, was told may   need to start HD sessions in near future.        Plan:  Admit to tele for nausea, decreased PO intake. Has CKD V at baseline, likely has early signs of uremia. No asterixis on exam.  Appreciate renal consult.     IVF Normal Saline started, follow lytes in AM. Glucose today , decrease Lantus 12, Admelog 4 units tid, A1C in AM, finger sticks every 4 hours for 24 hours.     A1C is 8.3, average control.     Beta-hydroxybuterate .50, mild elevation, anion gap normal. No DKA. Phos elevated 6.4, will start Phoslo tid.     Creatinine 10.0 on arrival, agree renal sonogram to confirm MRD. HGB acceptable at 10.4, CXR is clear.     Will continue home meds: Cozaar 100 mg/day, stop Procardia BP low, will add Lopressor 25 mg bid.         Renal sono notes MRD, large protinuria noted.     Had discussion with Nephro attending yesterday, possible perm cath today if pt agrees.      Stop IVF, no affect on creatinine levels after 72 hours.      Continue daily Lantus 12, Admelog 4 units tid.  Patient is Type I DM.

## 2025-01-06 NOTE — CONSULT NOTE ADULT - SUBJECTIVE AND OBJECTIVE BOX
Interventional Radiology    Evaluate for Procedure: tempcath    HPI: 24y Female with PMH DM(Type I), CKD V,  HTN  presents with c/o 3 days of generalized abdominal pain, nausea, diarrhea. Patient also c/o midsternal CP and SOB. KIKE on CKD(5) - prerenal azotemia, ATN vs rapid progression of diabetic nephropathy with malignant proteinuria. Plan per nephro to initiate HD this admission. IR consulted for placement of nontunneled hemodialysis catheter.     Allergies: No Known Allergies    Medications (Abx/Cardiac/Anticoagulation/Blood Products)  hydrALAZINE Injectable: 10 milliGRAM(s) IV Push (01-06 @ 11:38)  metoprolol tartrate: 25 milliGRAM(s) Oral (01-06 @ 05:25)  NIFEdipine XL: 90 milliGRAM(s) Oral (01-04 @ 18:49)    Data:    T(C): 36.7  HR: 99  BP: 117/78  RR: 17  SpO2: 96%    -WBC 8.59 / HgB 7.7 / Hct 23.2 / Plt 309  -Na 138 / Cl 111 /  / Glucose 80  -K 3.7 / CO2 16 / Cr 10.80  -ALT -- / Alk Phos -- / T.Bili --    Radiology: Reviewed    Assessment/Plan:   -24y Female with PMH DM(Type I), CKD V,  HTN  presents with c/o 3 days of generalized abdominal pain, nausea, diarrhea. Patient also c/o midsternal CP and SOB. KIKE on CKD(5) - prerenal azotemia, ATN vs rapid progression of diabetic nephropathy with malignant proteinuria. Plan per nephro to initiate HD this admission. IR consulted for placement of nontunneled hemodialysis catheter.       - case reviewed and approved for nontunneled hemodialysis catheter placement today  - IR procedure ordered  - Labs reviewed  - not on AC  - does not need to be NPO  - d/w Dr. Cosme      Interventional Radiology  ------------------------------------  For emergent consults, please call [phone], or call or message on TEAMs.   For non-emergent consults, consults after hours or over the weekend, please place IR Consult order.

## 2025-01-07 LAB
GLUCOSE BLDC GLUCOMTR-MCNC: 195 MG/DL — HIGH (ref 70–99)
GLUCOSE BLDC GLUCOMTR-MCNC: 209 MG/DL — HIGH (ref 70–99)
GLUCOSE BLDC GLUCOMTR-MCNC: 249 MG/DL — HIGH (ref 70–99)
GLUCOSE BLDC GLUCOMTR-MCNC: 278 MG/DL — HIGH (ref 70–99)
GLUCOSE BLDC GLUCOMTR-MCNC: 61 MG/DL — LOW (ref 70–99)
GLUCOSE BLDC GLUCOMTR-MCNC: 63 MG/DL — LOW (ref 70–99)
HAV IGM SER-ACNC: SIGNIFICANT CHANGE UP
HBV CORE IGM SER-ACNC: SIGNIFICANT CHANGE UP
HBV SURFACE AG SER-ACNC: SIGNIFICANT CHANGE UP
HCV AB S/CO SERPL IA: 0.08 S/CO — SIGNIFICANT CHANGE UP (ref 0–0.99)
HCV AB SERPL-IMP: SIGNIFICANT CHANGE UP

## 2025-01-07 PROCEDURE — 99232 SBSQ HOSP IP/OBS MODERATE 35: CPT

## 2025-01-07 RX ORDER — INSULIN LISPRO 100/ML
5 VIAL (ML) SUBCUTANEOUS
Refills: 0 | Status: DISCONTINUED | OUTPATIENT
Start: 2025-01-07 | End: 2025-01-09

## 2025-01-07 RX ORDER — EPOETIN ALFA 2000 [IU]/ML
10000 SOLUTION INTRAVENOUS; SUBCUTANEOUS ONCE
Refills: 0 | Status: COMPLETED | OUTPATIENT
Start: 2025-01-08 | End: 2025-01-08

## 2025-01-07 RX ORDER — INSULIN GLARGINE-YFGN 100 [IU]/ML
15 INJECTION, SOLUTION SUBCUTANEOUS AT BEDTIME
Refills: 0 | Status: DISCONTINUED | OUTPATIENT
Start: 2025-01-07 | End: 2025-01-09

## 2025-01-07 RX ADMIN — Medication 2: at 08:08

## 2025-01-07 RX ADMIN — PANTOPRAZOLE 40 MILLIGRAM(S): 40 TABLET, DELAYED RELEASE ORAL at 08:09

## 2025-01-07 RX ADMIN — Medication 4 UNIT(S): at 08:09

## 2025-01-07 RX ADMIN — Medication 25 MILLIGRAM(S): at 19:42

## 2025-01-07 RX ADMIN — CHLORHEXIDINE GLUCONATE 1 APPLICATION(S): 1.2 RINSE ORAL at 05:23

## 2025-01-07 RX ADMIN — Medication 5 UNIT(S): at 19:41

## 2025-01-07 RX ADMIN — CALCITRIOL 0.25 MICROGRAM(S): 0.5 CAPSULE, LIQUID FILLED ORAL at 14:27

## 2025-01-07 RX ADMIN — SODIUM BICARBONATE 650 MILLIGRAM(S): 84 INJECTION, SOLUTION INTRAVENOUS at 05:22

## 2025-01-07 RX ADMIN — Medication 4: at 19:41

## 2025-01-07 RX ADMIN — CALCIUM ACETATE 667 MILLIGRAM(S): 667 CAPSULE ORAL at 19:42

## 2025-01-07 RX ADMIN — CALCIUM ACETATE 667 MILLIGRAM(S): 667 CAPSULE ORAL at 14:27

## 2025-01-07 RX ADMIN — SODIUM BICARBONATE 650 MILLIGRAM(S): 84 INJECTION, SOLUTION INTRAVENOUS at 22:07

## 2025-01-07 RX ADMIN — Medication 25 MILLIGRAM(S): at 05:23

## 2025-01-07 RX ADMIN — INSULIN GLARGINE-YFGN 15 UNIT(S): 100 INJECTION, SOLUTION SUBCUTANEOUS at 22:07

## 2025-01-07 RX ADMIN — SODIUM BICARBONATE 650 MILLIGRAM(S): 84 INJECTION, SOLUTION INTRAVENOUS at 14:29

## 2025-01-07 RX ADMIN — CALCIUM ACETATE 667 MILLIGRAM(S): 667 CAPSULE ORAL at 08:09

## 2025-01-07 NOTE — PROGRESS NOTE ADULT - ASSESSMENT
25 yo female PMH DM(Type I), CKD V,  HTN  presents with c/o 3 days of generalized abdominal pain, nausea, diarrhea. Patient also c/o midsternal CP and SOB.  Patient denies fevers, cough, dysuria. Patient reports she has been using normal insulin amount during illness.  on arrival to ER. Has CKD V at baseline, was told may   need to start HD sessions in near future.        Plan:  Admit to tele for nausea, decreased PO intake. Has CKD V at baseline, likely has early signs of uremia. No asterixis on exam.  Appreciate renal consult.     IVF Normal Saline started, follow lytes in AM. Glucose today , decrease Lantus 12, Admelog 4 units tid, A1C in AM, finger sticks every 4 hours for 24 hours.     A1C is 8.3, average control.     Beta-hydroxybuterate .50, mild elevation, anion gap normal. No DKA. Phos elevated 6.4, will start Phoslo tid.     Creatinine 10.0 on arrival, agree renal sonogram to confirm MRD. HGB acceptable at 10.4, CXR is clear.     Will continue home meds: Cozaar 100 mg/day, stop Procardia BP low, will add Lopressor 25 mg bid.         Renal sono notes MRD, large protinuria noted.     Had discussion with Nephro attending yesterday, possible perm cath today if pt agrees.      Stop IVF, no affect on creatinine levels after 72 hours.      Continue daily Lantus 12, Admelog 4 units tid.  Patient is Type I DM.     23 yo female PMH DM(Type I), CKD V,  HTN  presents with c/o 3 days of generalized abdominal pain, nausea, diarrhea. Patient also c/o midsternal CP and SOB.  Patient denies fevers, cough, dysuria. Patient reports she has been using normal insulin amount during illness.  on arrival to ER. Has CKD V at baseline, was told may   need to start HD sessions in near future.        Plan:  Admit to tele for nausea, decreased PO intake. Has CKD V at baseline, likely has early signs of uremia. No asterixis on exam.  Appreciate renal consult.     IVF stopped. Glucose today acceptable 195, increase Lantus 15, Admelog 5 units tid, A1C in AM, finger sticks every 4 hours for 24 hours.     A1C is 8.3, average control.     Beta-hydroxybuterate .50, mild elevation, anion gap normal. No DKA. Phos elevated 6.4, will start Phoslo tid.     Creatinine 10.0 on arrival, agree renal sonogram to confirm MRD. HGB acceptable at 10.4, CXR is clear.     BP well controlled now on Lopressor 25 mg bid.         Renal sono notes MRD, large protinuria noted.     Had discussion with Nephro attending yesterday, started HD yesterday.      Change to perm cath, needs outpatient HD center. Pt is new to HD.

## 2025-01-07 NOTE — CONSULT NOTE ADULT - NS ATTEND AMEND GEN_ALL_CORE FT
CKD, now initiated on HD this admission  Currently via right sided temp hd cath  right hand dominant  no prior history of central venous catheters  palp brachial, radial pulses bilaterally. chantale's normal bilaterally.    plan for AV access creation this admission (on OR schedule Friday 1/10/2025)  vein mapping bue  left arm precautions - no ivs, venipuncture left arm  medical optimization

## 2025-01-07 NOTE — PROGRESS NOTE ADULT - SUBJECTIVE AND OBJECTIVE BOX
INTERVAL HPI/OVERNIGHT EVENTS:  Pt seen and examined at bedside.     Allergies/Intolerance: No Known Allergies      MEDICATIONS  (STANDING):  calcitriol   Capsule 0.25 MICROGram(s) Oral daily  calcium acetate 667 milliGRAM(s) Oral three times a day with meals  chlorhexidine 2% Cloths 1 Application(s) Topical <User Schedule>  dextrose 5%. 1000 milliLiter(s) (100 mL/Hr) IV Continuous <Continuous>  dextrose 5%. 1000 milliLiter(s) (50 mL/Hr) IV Continuous <Continuous>  dextrose 50% Injectable 25 Gram(s) IV Push once  dextrose 50% Injectable 12.5 Gram(s) IV Push once  dextrose 50% Injectable 25 Gram(s) IV Push once  glucagon  Injectable 1 milliGRAM(s) IntraMuscular once  influenza   Vaccine 0.5 milliLiter(s) IntraMuscular once  insulin glargine Injectable (LANTUS) 12 Unit(s) SubCutaneous at bedtime  insulin lispro (ADMELOG) corrective regimen sliding scale   SubCutaneous three times a day before meals  insulin lispro Injectable (ADMELOG) 4 Unit(s) SubCutaneous three times a day before meals  metoprolol tartrate 25 milliGRAM(s) Oral two times a day  pantoprazole    Tablet 40 milliGRAM(s) Oral before breakfast  sodium bicarbonate 650 milliGRAM(s) Oral three times a day    MEDICATIONS  (PRN):  dextrose Oral Gel 15 Gram(s) Oral once PRN Blood Glucose LESS THAN 70 milliGRAM(s)/deciliter  sodium chloride 0.9% lock flush 10 milliLiter(s) IV Push every 1 hour PRN Pre/post blood products, medications, blood draw, and to maintain line patency        ROS: all systems reviewed and wnl      PHYSICAL EXAMINATION:  Vital Signs Last 24 Hrs  T(C): 37.1 (07 Jan 2025 05:13), Max: 37.3 (06 Jan 2025 18:30)  T(F): 98.8 (07 Jan 2025 05:13), Max: 99.2 (06 Jan 2025 18:30)  HR: 94 (07 Jan 2025 05:13) (54 - 104)  BP: 131/82 (07 Jan 2025 05:13) (117/78 - 164/105)  BP(mean): --  RR: 18 (07 Jan 2025 05:13) (17 - 20)  SpO2: 99% (07 Jan 2025 05:13) (96% - 100%)    Parameters below as of 07 Jan 2025 05:13  Patient On (Oxygen Delivery Method): room air      CAPILLARY BLOOD GLUCOSE      POCT Blood Glucose.: 195 mg/dL (07 Jan 2025 07:39)  POCT Blood Glucose.: 267 mg/dL (06 Jan 2025 21:20)  POCT Blood Glucose.: 233 mg/dL (06 Jan 2025 19:14)  POCT Blood Glucose.: 252 mg/dL (06 Jan 2025 14:45)  POCT Blood Glucose.: 76 mg/dL (06 Jan 2025 11:47)      01-06 @ 07:01  -  01-07 @ 07:00  --------------------------------------------------------  IN: 760 mL / OUT: 0 mL / NET: 760 mL        GENERAL:   NECK: supple, No JVD  CHEST/LUNG: clear to auscultation bilaterally; no rales, rhonchi, or wheezing b/l  HEART: normal S1, S2  ABDOMEN: BS+, soft, ND, NT   EXTREMITIES:  pulses palpable; no clubbing, cyanosis, or edema b/l LEs    LABS:                        7.7    8.59  )-----------( 309      ( 06 Jan 2025 10:07 )             23.2     01-06    138  |  111[H]  |  102[H]  ----------------------------<  80  3.7   |  16[L]  |  10.80[H]    Ca    7.5[L]      06 Jan 2025 10:07  Phos  6.6     01-06        Urinalysis Basic - ( 06 Jan 2025 10:07 )    Color: x / Appearance: x / SG: x / pH: x  Gluc: 80 mg/dL / Ketone: x  / Bili: x / Urobili: x   Blood: x / Protein: x / Nitrite: x   Leuk Esterase: x / RBC: x / WBC x   Sq Epi: x / Non Sq Epi: x / Bacteria: x             INTERVAL HPI/OVERNIGHT EVENTS:  Pt seen and examined at bedside.     Allergies/Intolerance: No Known Allergies      MEDICATIONS  (STANDING):  calcitriol   Capsule 0.25 MICROGram(s) Oral daily  calcium acetate 667 milliGRAM(s) Oral three times a day with meals  chlorhexidine 2% Cloths 1 Application(s) Topical <User Schedule>  dextrose 5%. 1000 milliLiter(s) (100 mL/Hr) IV Continuous <Continuous>  dextrose 5%. 1000 milliLiter(s) (50 mL/Hr) IV Continuous <Continuous>  dextrose 50% Injectable 25 Gram(s) IV Push once  dextrose 50% Injectable 12.5 Gram(s) IV Push once  dextrose 50% Injectable 25 Gram(s) IV Push once  glucagon  Injectable 1 milliGRAM(s) IntraMuscular once  influenza   Vaccine 0.5 milliLiter(s) IntraMuscular once  insulin glargine Injectable (LANTUS) 12 Unit(s) SubCutaneous at bedtime  insulin lispro (ADMELOG) corrective regimen sliding scale   SubCutaneous three times a day before meals  insulin lispro Injectable (ADMELOG) 4 Unit(s) SubCutaneous three times a day before meals  metoprolol tartrate 25 milliGRAM(s) Oral two times a day  pantoprazole    Tablet 40 milliGRAM(s) Oral before breakfast  sodium bicarbonate 650 milliGRAM(s) Oral three times a day    MEDICATIONS  (PRN):  dextrose Oral Gel 15 Gram(s) Oral once PRN Blood Glucose LESS THAN 70 milliGRAM(s)/deciliter  sodium chloride 0.9% lock flush 10 milliLiter(s) IV Push every 1 hour PRN Pre/post blood products, medications, blood draw, and to maintain line patency        ROS: all systems reviewed and wnl      PHYSICAL EXAMINATION:  Vital Signs Last 24 Hrs  T(C): 37.1 (07 Jan 2025 05:13), Max: 37.3 (06 Jan 2025 18:30)  T(F): 98.8 (07 Jan 2025 05:13), Max: 99.2 (06 Jan 2025 18:30)  HR: 94 (07 Jan 2025 05:13) (54 - 104)  BP: 131/82 (07 Jan 2025 05:13) (117/78 - 164/105)  BP(mean): --  RR: 18 (07 Jan 2025 05:13) (17 - 20)  SpO2: 99% (07 Jan 2025 05:13) (96% - 100%)    Parameters below as of 07 Jan 2025 05:13  Patient On (Oxygen Delivery Method): room air      CAPILLARY BLOOD GLUCOSE      POCT Blood Glucose.: 195 mg/dL (07 Jan 2025 07:39)  POCT Blood Glucose.: 267 mg/dL (06 Jan 2025 21:20)  POCT Blood Glucose.: 233 mg/dL (06 Jan 2025 19:14)  POCT Blood Glucose.: 252 mg/dL (06 Jan 2025 14:45)  POCT Blood Glucose.: 76 mg/dL (06 Jan 2025 11:47)      01-06 @ 07:01  -  01-07 @ 07:00  --------------------------------------------------------  IN: 760 mL / OUT: 0 mL / NET: 760 mL        GENERAL: stable, had HD session yesterday, temporary cath in place.   NECK: supple, No JVD  CHEST/LUNG: clear to auscultation bilaterally; no rales, rhonchi, or wheezing b/l  HEART: normal S1, S2  ABDOMEN: BS+, soft, ND, NT   EXTREMITIES:  pulses palpable; no clubbing, cyanosis, or edema b/l LEs    LABS:                        7.7    8.59  )-----------( 309      ( 06 Jan 2025 10:07 )             23.2     01-06    138  |  111[H]  |  102[H]  ----------------------------<  80  3.7   |  16[L]  |  10.80[H]    Ca    7.5[L]      06 Jan 2025 10:07  Phos  6.6     01-06        Urinalysis Basic - ( 06 Jan 2025 10:07 )    Color: x / Appearance: x / SG: x / pH: x  Gluc: 80 mg/dL / Ketone: x  / Bili: x / Urobili: x   Blood: x / Protein: x / Nitrite: x   Leuk Esterase: x / RBC: x / WBC x   Sq Epi: x / Non Sq Epi: x / Bacteria: x

## 2025-01-07 NOTE — PROGRESS NOTE ADULT - SUBJECTIVE AND OBJECTIVE BOX
[facility] NEPHROLOGY SERVICES, [facility]  NEPHROLOGY AND HYPERTENSION  [address]  [address]  [place] [address]  [phone]    MD ANTONI JEFFERSON MD YELENA ROSENBERG, MD BINNY KOSHY, MD CHRISTOPHER CAPUTO, MD EDWARD BOVER, MD          Patient events noted      MEDICATIONS  (STANDING):  calcitriol   Capsule 0.25 MICROGram(s) Oral daily  calcium acetate 667 milliGRAM(s) Oral three times a day with meals  chlorhexidine 2% Cloths 1 Application(s) Topical <User Schedule>  dextrose 5%. 1000 milliLiter(s) (100 mL/Hr) IV Continuous <Continuous>  dextrose 5%. 1000 milliLiter(s) (50 mL/Hr) IV Continuous <Continuous>  dextrose 50% Injectable 25 Gram(s) IV Push once  dextrose 50% Injectable 12.5 Gram(s) IV Push once  dextrose 50% Injectable 25 Gram(s) IV Push once  glucagon  Injectable 1 milliGRAM(s) IntraMuscular once  influenza   Vaccine 0.5 milliLiter(s) IntraMuscular once  insulin glargine Injectable (LANTUS) 15 Unit(s) SubCutaneous at bedtime  insulin lispro (ADMELOG) corrective regimen sliding scale   SubCutaneous three times a day before meals  insulin lispro Injectable (ADMELOG) 5 Unit(s) SubCutaneous three times a day before meals  metoprolol tartrate 25 milliGRAM(s) Oral two times a day  pantoprazole    Tablet 40 milliGRAM(s) Oral before breakfast  sodium bicarbonate 650 milliGRAM(s) Oral three times a day    MEDICATIONS  (PRN):  dextrose Oral Gel 15 Gram(s) Oral once PRN Blood Glucose LESS THAN 70 milliGRAM(s)/deciliter  sodium chloride 0.9% lock flush 10 milliLiter(s) IV Push every 1 hour PRN Pre/post blood products, medications, blood draw, and to maintain line patency      01-06-25 @ 07:01  -  01-07-25 @ 07:00  --------------------------------------------------------  IN: 760 mL / OUT: 0 mL / NET: 760 mL    01-07-25 @ 07:01  -  01-07-25 @ 22:31  --------------------------------------------------------  IN: 360 mL / OUT: 1800 mL / NET: -1440 mL      PHYSICAL EXAM:      T(C): 37.1 (01-07-25 @ 18:40), Max: 37.2 (01-07-25 @ 10:56)  HR: 100 (01-07-25 @ 18:40) (93 - 100)  BP: 143/92 (01-07-25 @ 18:40) (118/75 - 147/98)  RR: 18 (01-07-25 @ 18:40) (16 - 19)  SpO2: 100% (01-07-25 @ 18:40) (99% - 100%)  Wt(kg): --  Lungs clear  Heart S1S2  Abd soft NT ND  Extremities:   tr edema                                    7.7    8.59  )-----------( 309      ( 06 Jan 2025 10:07 )             23.2     01-06    138  |  111[H]  |  102[H]  ----------------------------<  80  3.7   |  16[L]  |  10.80[H]    Ca    7.5[L]      06 Jan 2025 10:07  Phos  6.6     01-06          Creatinine Trend: 10.80<--, 10.40<--, 10.40<--, 10.60<--      Assessment   New ESRD;     Plan:  HD for today  Perm cath Friday   Fe profile, KARLY  AV access eval Dr Mat Cosme MD

## 2025-01-07 NOTE — CONSULT NOTE ADULT - SUBJECTIVE AND OBJECTIVE BOX
SURGERY PA CONSULT NOTE:    CHIEF COMPLAINT:  Patient is a 24y old  Female who presents with a chief complaint of Nausea, poor po intake, CKD V, Type I DM (07 Jan 2025 08:46)      HPI FROM ED:  HPI:  23 yo female PMH DM(Type I), CKD V,  HTN  presents with c/o 3 days of generalized abdominal pain, nausea, diarrhea. Patient also c/o midsternal CP and SOB.  Patient denies fevers, cough, dysuria. Patient reports she has been using normal insulin amount during illness.  on arrival to ER. Has CKD V at baseline, was told may   need to start HD sessions in near future.   (04 Jan 2025 14:47)      PAST MEDICAL HISTORY:  PAST MEDICAL & SURGICAL HISTORY:  DM (diabetes mellitus), type 1      Chronic kidney disease, unspecified CKD stage      HTN (hypertension)          PAST SURGICAL HISTORY:    REVIEW OF SYSTEMS:  General/Constitutional: No acute distress, no headache, weakness, fevers, or chills   HEENT: Denies auditory or visual changes/disturbances, no vertigo, no throat pain, no dysphagia    Neck: Denies neck pain/stiffness, denies swelling/lumps/hoarseness   Lymphatic: Denies lumps or swelling in the axillae, groin, or neck bilaterally   Respiratory: Denies cough/hemoptysis, denies wheezing/SOB/dyspnea  Cardiac: Denies chest pain, palpitations  Abdomen: Denies abdominal bloating/fullness, nausea or vomiting, denies abdominal pain  Extremities: Denies sores, swelling, discoloration bilat UE/LE  Genitourinary: Denies urinary issues or complaints, denies dysuria/hematuria  Neuro: Denies weakness, paraesthesias, paralysis, syncope, loss of vision  Skin: Denies pruritus, pain, rashes  Psych: Denies hallucinations, visual disturbances, or depression    MEDICATIONS:  Home Medications:    MEDICATIONS  (STANDING):  calcitriol   Capsule 0.25 MICROGram(s) Oral daily  calcium acetate 667 milliGRAM(s) Oral three times a day with meals  chlorhexidine 2% Cloths 1 Application(s) Topical <User Schedule>  dextrose 5%. 1000 milliLiter(s) (100 mL/Hr) IV Continuous <Continuous>  dextrose 5%. 1000 milliLiter(s) (50 mL/Hr) IV Continuous <Continuous>  dextrose 50% Injectable 25 Gram(s) IV Push once  dextrose 50% Injectable 12.5 Gram(s) IV Push once  dextrose 50% Injectable 25 Gram(s) IV Push once  glucagon  Injectable 1 milliGRAM(s) IntraMuscular once  influenza   Vaccine 0.5 milliLiter(s) IntraMuscular once  insulin glargine Injectable (LANTUS) 15 Unit(s) SubCutaneous at bedtime  insulin lispro (ADMELOG) corrective regimen sliding scale   SubCutaneous three times a day before meals  insulin lispro Injectable (ADMELOG) 5 Unit(s) SubCutaneous three times a day before meals  metoprolol tartrate 25 milliGRAM(s) Oral two times a day  pantoprazole    Tablet 40 milliGRAM(s) Oral before breakfast  sodium bicarbonate 650 milliGRAM(s) Oral three times a day    MEDICATIONS  (PRN):  dextrose Oral Gel 15 Gram(s) Oral once PRN Blood Glucose LESS THAN 70 milliGRAM(s)/deciliter  sodium chloride 0.9% lock flush 10 milliLiter(s) IV Push every 1 hour PRN Pre/post blood products, medications, blood draw, and to maintain line patency      ALLERGIES:  Allergies    No Known Allergies    Intolerances        SOCIAL HISTORY:  Social History:    Smoking: Yes [ ]  No [ ]   ______pk yrs  ETOH  Yes [ ]  No [ ]  Social [ ]  DRUGS:  Yes [ ]  No [ ]  if so what______________    FAMILY HISTORY:  FAMILY HISTORY:      VITAL SIGNS:  Vital Signs Last 24 Hrs  T(C): 36.9 (07 Jan 2025 15:25), Max: 37.3 (06 Jan 2025 18:30)  T(F): 98.5 (07 Jan 2025 15:25), Max: 99.2 (06 Jan 2025 18:30)  HR: 100 (07 Jan 2025 15:25) (93 - 104)  BP: 147/98 (07 Jan 2025 15:25) (118/75 - 160/102)  BP(mean): --  RR: 19 (07 Jan 2025 15:25) (16 - 19)  SpO2: 100% (07 Jan 2025 15:25) (99% - 100%)    Parameters below as of 07 Jan 2025 15:25  Patient On (Oxygen Delivery Method): room air        PHYSICAL EXAM:  General: No acute distress, appears comfortable, well nourished, well-groomed, appears stated age  Head, Eyes, Ears, Nose, Throat: Normal cephalic/atraumatic, anicteric, conjunctiva-non injected and moist, vision grossly intact, hearing grossly intact, no nasal discharge, ears and nose symmetrical and atraumatic.  Nasal, oral, and oropharyngeal mucosa pink moist with no evidence of ulceration  Neck: Supple, carotids have good upstroke, trachea in the midline, without JVD or thyromegaly  Lymphatic: No evidence of masses or lymphadenopathy in the head, neck, trunk, axillary, inguinal, or supraclavicular regions  Chest: Lungs are clear to P&A, no wheezing, no rales, no ronchi, with good inspiratory effort  Heart: Heart rhythm regular, no murmurs  Abdomen: Soft, non tender, good bowel sounds present in all four quadrants.  No guarding, rebound, and no peritoneal signs.  No evidence of hepatosplenomegaly.  No evidence of abdominal wall hernias.  Inguinal regions are unremarkable with no evidence of hernias.   Extremity: No swelling, or open sores, no gross deformities,  good range of motion, 2+ peripheral pulses bilat UE/LE, no edema,  negative Debby's sign, no lymphadenopathy  Neuro: Alert and oriented x3, motor and sensory intact  Psychiatric: Awake , alert, oriented x3 with an appropriate affect.   Skin: Good color, turgor, texture with no gross lesions, no eruptions, no rashes, no subcutaneous nodules and normal temperature.     LABS:                        7.7    8.59  )-----------( 309      ( 06 Jan 2025 10:07 )             23.2     01-06    138  |  111[H]  |  102[H]  ----------------------------<  80  3.7   |  16[L]  |  10.80[H]    Ca    7.5[L]      06 Jan 2025 10:07  Phos  6.6     01-06        Urinalysis Basic - ( 06 Jan 2025 10:07 )    Color: x / Appearance: x / SG: x / pH: x  Gluc: 80 mg/dL / Ketone: x  / Bili: x / Urobili: x   Blood: x / Protein: x / Nitrite: x   Leuk Esterase: x / RBC: x / WBC x   Sq Epi: x / Non Sq Epi: x / Bacteria: x            RADIOLOGY & ADDITIONAL STUDIES:    ASSESSMENT:    PLAN: VASCULAR SURGERY PA CONSULT NOTE:    CHIEF COMPLAINT:  Patient is a 24y old  Female who presents with a chief complaint of Nausea, poor po intake, CKD V, Type I DM (07 Jan 2025 08:46)      HPI FROM ED:  HPI:  23 yo female PMH DM(Type I), CKD V,  HTN  presents with c/o 3 days of generalized abdominal pain, nausea, diarrhea. Patient also c/o midsternal CP and SOB.  Patient denies fevers, cough, dysuria. Patient reports she has been using normal insulin amount during illness.  on arrival to ER. Has CKD V at baseline, was told may   need to start HD sessions in near future.   (04 Jan 2025 14:47)    Interval HPI:  Patient seen and examined in HD unit.  Patient is a 24 year old female w/ PMHx DM (type I) CKD stage V, HTN, presented to [facility] on 1/4 with mid-epigastric pain, nausea, and vomiting.  Patient states her symptoms started on Tuesday prior to admission.  Since admission, patient states symptoms have resolved and she is now tolerating a regular diet, passing flatus, and having bowel movements.  On admission lab work, Cr was found to be 10 and GFR was 5.  Per patient, her last Cr was 5-6 and GFR was 12.  She last saw her nephrologist in November 2024.  She followed with Nephrologist and Endocrinologist in [place], before moving to [place].  Patient states she is right hand dominant.  She has never had catheters placed in either IJ previously.        PAST MEDICAL HISTORY:  PAST MEDICAL & SURGICAL HISTORY:  DM (diabetes mellitus), type 1      Chronic kidney disease, unspecified CKD stage      HTN (hypertension)          PAST SURGICAL HISTORY:    REVIEW OF SYSTEMS:  General/Constitutional: No acute distress, no headache, weakness, fevers, or chills   HEENT: Denies auditory or visual changes/disturbances, no vertigo, no throat pain, no dysphagia    Neck: Denies neck pain/stiffness, denies swelling/lumps/hoarseness   Lymphatic: Denies lumps or swelling in the axillae, groin, or neck bilaterally   Respiratory: Denies cough/hemoptysis, denies wheezing/SOB/dyspnea  Cardiac: Denies chest pain, palpitations  Abdomen: Denies abdominal bloating/fullness, nausea or vomiting, denies abdominal pain  Extremities: Denies sores, swelling, discoloration bilat UE/LE  Genitourinary: Denies urinary issues or complaints, denies dysuria/hematuria  Neuro: Denies weakness, paraesthesias, paralysis, syncope, loss of vision  Skin: Denies pruritus, pain, rashes  Psych: Denies hallucinations, visual disturbances, or depression    MEDICATIONS:  Home Medications:    MEDICATIONS  (STANDING):  calcitriol   Capsule 0.25 MICROGram(s) Oral daily  calcium acetate 667 milliGRAM(s) Oral three times a day with meals  chlorhexidine 2% Cloths 1 Application(s) Topical <User Schedule>  dextrose 5%. 1000 milliLiter(s) (100 mL/Hr) IV Continuous <Continuous>  dextrose 5%. 1000 milliLiter(s) (50 mL/Hr) IV Continuous <Continuous>  dextrose 50% Injectable 25 Gram(s) IV Push once  dextrose 50% Injectable 12.5 Gram(s) IV Push once  dextrose 50% Injectable 25 Gram(s) IV Push once  glucagon  Injectable 1 milliGRAM(s) IntraMuscular once  influenza   Vaccine 0.5 milliLiter(s) IntraMuscular once  insulin glargine Injectable (LANTUS) 15 Unit(s) SubCutaneous at bedtime  insulin lispro (ADMELOG) corrective regimen sliding scale   SubCutaneous three times a day before meals  insulin lispro Injectable (ADMELOG) 5 Unit(s) SubCutaneous three times a day before meals  metoprolol tartrate 25 milliGRAM(s) Oral two times a day  pantoprazole    Tablet 40 milliGRAM(s) Oral before breakfast  sodium bicarbonate 650 milliGRAM(s) Oral three times a day    MEDICATIONS  (PRN):  dextrose Oral Gel 15 Gram(s) Oral once PRN Blood Glucose LESS THAN 70 milliGRAM(s)/deciliter  sodium chloride 0.9% lock flush 10 milliLiter(s) IV Push every 1 hour PRN Pre/post blood products, medications, blood draw, and to maintain line patency      ALLERGIES:  Allergies    No Known Allergies    Intolerances        SOCIAL HISTORY:  Social History:    Smoking: Yes [ ]  No [x ]   ______pk yrs  ETOH  Yes [ ]  No [x ]  Social [ ]  DRUGS:  Yes [ ]  No [x ]  if so what______________    FAMILY HISTORY:  FAMILY HISTORY:      VITAL SIGNS:  Vital Signs Last 24 Hrs  T(C): 36.9 (07 Jan 2025 15:25), Max: 37.3 (06 Jan 2025 18:30)  T(F): 98.5 (07 Jan 2025 15:25), Max: 99.2 (06 Jan 2025 18:30)  HR: 100 (07 Jan 2025 15:25) (93 - 104)  BP: 147/98 (07 Jan 2025 15:25) (118/75 - 160/102)  BP(mean): --  RR: 19 (07 Jan 2025 15:25) (16 - 19)  SpO2: 100% (07 Jan 2025 15:25) (99% - 100%)    Parameters below as of 07 Jan 2025 15:25  Patient On (Oxygen Delivery Method): room air        PHYSICAL EXAM:  General: No acute distress, appears comfortable  Head, Eyes, Ears, Nose, Throat: Normal cephalic/atraumatic, anicteric, conjunctiva-non injected and moist, vision grossly intact, hearing grossly intact, no nasal discharge, ears and nose symmetrical and atraumatic.  Nasal, oral, and oropharyngeal mucosa pink moist with no evidence of ulceration  Neck: Supple, carotids have good upstroke, trachea in the midline, without JVD or thyromegaly.  R sided dual-lumen IJ catheter placed.    Lymphatic: No evidence of masses or lymphadenopathy in the head, neck, trunk, axillary, inguinal, or supraclavicular regions  Chest: Lungs are clear to P&A, no wheezing, no rales, no ronchi, with good inspiratory effort  Heart: Heart rhythm regular, no murmurs  Abdomen: Soft, non tender, good bowel sounds present in all four quadrants.  No guarding, rebound, and no peritoneal signs.  No evidence of hepatosplenomegaly.  No evidence of abdominal wall hernias.  Inguinal regions are unremarkable with no evidence of hernias.   Extremity: No swelling, or open sores, no gross deformities.  Palpable brachial, radial, and ulnar pulses bilaterally.    Neuro: Alert and oriented x3, motor and sensory intact  Psychiatric: Awake , alert, oriented x3 with an appropriate affect.   Skin: Good color, turgor, texture with no gross lesions, no eruptions, no rashes, no subcutaneous nodules and normal temperature.     LABS:                        7.7    8.59  )-----------( 309      ( 06 Jan 2025 10:07 )             23.2     01-06    138  |  111[H]  |  102[H]  ----------------------------<  80  3.7   |  16[L]  |  10.80[H]    Ca    7.5[L]      06 Jan 2025 10:07  Phos  6.6     01-06        Urinalysis Basic - ( 06 Jan 2025 10:07 )    Color: x / Appearance: x / SG: x / pH: x  Gluc: 80 mg/dL / Ketone: x  / Bili: x / Urobili: x   Blood: x / Protein: x / Nitrite: x   Leuk Esterase: x / RBC: x / WBC x   Sq Epi: x / Non Sq Epi: x / Bacteria: x            RADIOLOGY & ADDITIONAL STUDIES:    ASSESSMENT:   24 year old female w/ PMHx DM (type I) CKD stage V, HTN, presented to [facility] on 1/4 with mid-epigastric pain, nausea, and vomiting, found to have Cr 10.6 and GFR 5 on admission with persistent elevation throughout hospital course.  Patient now requiring HD through right sided nontunneled IJ catheter.  Vascular surgery consulted for AVF HD access.    PLAN:  - Will plan for AVF this admission   - Right handed dominant, Left arm precautions; no BP measurement or IV access in Left arm  - Vein mapping ordered  - Above discussed w/ Dr. Rivero

## 2025-01-08 LAB
ANION GAP SERPL CALC-SCNC: 6 MMOL/L — SIGNIFICANT CHANGE UP (ref 5–17)
BUN SERPL-MCNC: 51 MG/DL — HIGH (ref 7–23)
CALCIUM SERPL-MCNC: 7.8 MG/DL — LOW (ref 8.5–10.1)
CHLORIDE SERPL-SCNC: 105 MMOL/L — SIGNIFICANT CHANGE UP (ref 96–108)
CO2 SERPL-SCNC: 26 MMOL/L — SIGNIFICANT CHANGE UP (ref 22–31)
CREAT SERPL-MCNC: 6 MG/DL — HIGH (ref 0.5–1.3)
EGFR: 9 ML/MIN/1.73M2 — LOW
FERRITIN SERPL-MCNC: 672 NG/ML — HIGH (ref 15–150)
GLUCOSE BLDC GLUCOMTR-MCNC: 121 MG/DL — HIGH (ref 70–99)
GLUCOSE BLDC GLUCOMTR-MCNC: 163 MG/DL — HIGH (ref 70–99)
GLUCOSE BLDC GLUCOMTR-MCNC: 202 MG/DL — HIGH (ref 70–99)
GLUCOSE BLDC GLUCOMTR-MCNC: 211 MG/DL — HIGH (ref 70–99)
GLUCOSE SERPL-MCNC: 148 MG/DL — HIGH (ref 70–99)
HCT VFR BLD CALC: 21.9 % — LOW (ref 34.5–45)
HGB BLD-MCNC: 7.4 G/DL — LOW (ref 11.5–15.5)
IRON SATN MFR SERPL: 34 % — SIGNIFICANT CHANGE UP (ref 14–50)
IRON SATN MFR SERPL: 58 UG/DL — SIGNIFICANT CHANGE UP (ref 30–160)
MCHC RBC-ENTMCNC: 30.7 PG — SIGNIFICANT CHANGE UP (ref 27–34)
MCHC RBC-ENTMCNC: 33.8 G/DL — SIGNIFICANT CHANGE UP (ref 32–36)
MCV RBC AUTO: 90.9 FL — SIGNIFICANT CHANGE UP (ref 80–100)
NRBC # BLD: 0 /100 WBCS — SIGNIFICANT CHANGE UP (ref 0–0)
PHOSPHATE SERPL-MCNC: 3.8 MG/DL — SIGNIFICANT CHANGE UP (ref 2.5–4.5)
PLATELET # BLD AUTO: 296 K/UL — SIGNIFICANT CHANGE UP (ref 150–400)
POTASSIUM SERPL-MCNC: 3.8 MMOL/L — SIGNIFICANT CHANGE UP (ref 3.5–5.3)
POTASSIUM SERPL-SCNC: 3.8 MMOL/L — SIGNIFICANT CHANGE UP (ref 3.5–5.3)
RBC # BLD: 2.41 M/UL — LOW (ref 3.8–5.2)
RBC # FLD: 12.2 % — SIGNIFICANT CHANGE UP (ref 10.3–14.5)
SODIUM SERPL-SCNC: 137 MMOL/L — SIGNIFICANT CHANGE UP (ref 135–145)
TIBC SERPL-MCNC: 173 UG/DL — LOW (ref 220–430)
UIBC SERPL-MCNC: 115 UG/DL — SIGNIFICANT CHANGE UP (ref 110–370)
WBC # BLD: 8.9 K/UL — SIGNIFICANT CHANGE UP (ref 3.8–10.5)
WBC # FLD AUTO: 8.9 K/UL — SIGNIFICANT CHANGE UP (ref 3.8–10.5)

## 2025-01-08 PROCEDURE — 99223 1ST HOSP IP/OBS HIGH 75: CPT

## 2025-01-08 PROCEDURE — 99232 SBSQ HOSP IP/OBS MODERATE 35: CPT

## 2025-01-08 PROCEDURE — 93970 EXTREMITY STUDY: CPT | Mod: 26

## 2025-01-08 RX ADMIN — Medication 5 UNIT(S): at 08:31

## 2025-01-08 RX ADMIN — SODIUM BICARBONATE 650 MILLIGRAM(S): 84 INJECTION, SOLUTION INTRAVENOUS at 04:57

## 2025-01-08 RX ADMIN — CALCITRIOL 0.25 MICROGRAM(S): 0.5 CAPSULE, LIQUID FILLED ORAL at 14:46

## 2025-01-08 RX ADMIN — Medication 25 MILLIGRAM(S): at 04:57

## 2025-01-08 RX ADMIN — CHLORHEXIDINE GLUCONATE 1 APPLICATION(S): 1.2 RINSE ORAL at 05:31

## 2025-01-08 RX ADMIN — CALCIUM ACETATE 667 MILLIGRAM(S): 667 CAPSULE ORAL at 17:54

## 2025-01-08 RX ADMIN — INSULIN GLARGINE-YFGN 15 UNIT(S): 100 INJECTION, SOLUTION SUBCUTANEOUS at 21:46

## 2025-01-08 RX ADMIN — EPOETIN ALFA 10000 UNIT(S): 2000 SOLUTION INTRAVENOUS; SUBCUTANEOUS at 11:10

## 2025-01-08 RX ADMIN — Medication 5 UNIT(S): at 16:21

## 2025-01-08 RX ADMIN — CALCIUM ACETATE 667 MILLIGRAM(S): 667 CAPSULE ORAL at 08:30

## 2025-01-08 RX ADMIN — CALCIUM ACETATE 667 MILLIGRAM(S): 667 CAPSULE ORAL at 14:46

## 2025-01-08 RX ADMIN — Medication 5 UNIT(S): at 14:44

## 2025-01-08 RX ADMIN — Medication 4: at 16:21

## 2025-01-08 RX ADMIN — PANTOPRAZOLE 40 MILLIGRAM(S): 40 TABLET, DELAYED RELEASE ORAL at 08:30

## 2025-01-08 RX ADMIN — Medication 2: at 08:30

## 2025-01-08 RX ADMIN — Medication 25 MILLIGRAM(S): at 17:54

## 2025-01-08 NOTE — PROGRESS NOTE ADULT - SUBJECTIVE AND OBJECTIVE BOX
[facility] NEPHROLOGY SERVICES, [facility]  NEPHROLOGY AND HYPERTENSION  [address]  [address]  [place] [address]  [phone]    MD ANTONI JEFFERSON MD YELENA ROSENBERG, MD BINNY KOSHY, MD CHRISTOPHER CAPUTO, MD EDWARD BOVER, MD          Patient events noted    MEDICATIONS  (STANDING):  calcitriol   Capsule 0.25 MICROGram(s) Oral daily  calcium acetate 667 milliGRAM(s) Oral three times a day with meals  chlorhexidine 2% Cloths 1 Application(s) Topical <User Schedule>  dextrose 5%. 1000 milliLiter(s) (100 mL/Hr) IV Continuous <Continuous>  dextrose 5%. 1000 milliLiter(s) (50 mL/Hr) IV Continuous <Continuous>  dextrose 50% Injectable 25 Gram(s) IV Push once  dextrose 50% Injectable 12.5 Gram(s) IV Push once  dextrose 50% Injectable 25 Gram(s) IV Push once  glucagon  Injectable 1 milliGRAM(s) IntraMuscular once  influenza   Vaccine 0.5 milliLiter(s) IntraMuscular once  insulin glargine Injectable (LANTUS) 15 Unit(s) SubCutaneous at bedtime  insulin lispro (ADMELOG) corrective regimen sliding scale   SubCutaneous three times a day before meals  insulin lispro Injectable (ADMELOG) 5 Unit(s) SubCutaneous three times a day before meals  metoprolol tartrate 25 milliGRAM(s) Oral two times a day  pantoprazole    Tablet 40 milliGRAM(s) Oral before breakfast    MEDICATIONS  (PRN):  dextrose Oral Gel 15 Gram(s) Oral once PRN Blood Glucose LESS THAN 70 milliGRAM(s)/deciliter  sodium chloride 0.9% lock flush 10 milliLiter(s) IV Push every 1 hour PRN Pre/post blood products, medications, blood draw, and to maintain line patency      01-07-25 @ 07:01  -  01-08-25 @ 07:00  --------------------------------------------------------  IN: 360 mL / OUT: 1800 mL / NET: -1440 mL    01-08-25 @ 07:01  -  01-08-25 @ 22:48  --------------------------------------------------------  IN: 0 mL / OUT: 1000 mL / NET: -1000 mL      PHYSICAL EXAM:      T(C): 36.7 (01-08-25 @ 17:13), Max: 37.3 (01-08-25 @ 05:32)  HR: 94 (01-08-25 @ 17:13) (90 - 95)  BP: 144/89 (01-08-25 @ 17:13) (117/83 - 162/102)  RR: 16 (01-08-25 @ 17:13) (16 - 20)  SpO2: 100% (01-08-25 @ 17:13) (98% - 100%)  Wt(kg): --  Lungs clear  Heart S1S2  Abd soft NT ND  Extremities:   tr edema                                    7.4    8.90  )-----------( 296      ( 08 Jan 2025 08:10 )             21.9     01-08    137  |  105  |  51[H]  ----------------------------<  148[H]  3.8   |  26  |  6.00[H]    Ca    7.8[L]      08 Jan 2025 08:10  Phos  3.8     01-08          Creatinine Trend: 6.00<--, 10.80<--, 10.40<--, 10.40<--, 10.60<--      Assessment   New ESRD     Plan:  HD for today  Perm cath, AVF for Friday  Discharge planning   Retacrit support     Selvin Cosme MD

## 2025-01-08 NOTE — PROGRESS NOTE ADULT - ASSESSMENT
25 yo female PMH DM(Type I), CKD V,  HTN  presents with c/o 3 days of generalized abdominal pain, nausea, diarrhea. Patient also c/o midsternal CP and SOB.  Patient denies fevers, cough, dysuria. Patient reports she has been using normal insulin amount during illness.  on arrival to ER. Has CKD V at baseline, was told may   need to start HD sessions in near future.        Plan:  Admit to tele for nausea, decreased PO intake. Has CKD V at baseline, likely has early signs of uremia. No asterixis on exam.  Appreciate renal consult.     IVF stopped. Glucose today acceptable 195, increase Lantus 15, Admelog 5 units tid, A1C in AM, finger sticks every 4 hours for 24 hours.     A1C is 8.3, average control.     Beta-hydroxybuterate .50, mild elevation, anion gap normal. No DKA. Phos elevated 6.4, will start Phoslo tid.     Creatinine 10.0 on arrival, agree renal sonogram to confirm MRD. HGB acceptable at 10.4, CXR is clear.     BP well controlled now on Lopressor 25 mg bid.         Renal sono notes MRD, large protinuria noted.     Started on HD, vein mapping to be done, and permacath placement for Friday.     Outpatient center is set up as patient is new to HD.

## 2025-01-08 NOTE — PROGRESS NOTE ADULT - SUBJECTIVE AND OBJECTIVE BOX
Patient seen and examined at bedside with no complaints.   Admits to flatus/BM.   Denies pain, nausea/ vomiting.   Tolerating diet.    Vital Signs Last 24 Hrs  T(F): 98.7 (01-08-25 @ 09:15), Max: 99.2 (01-08-25 @ 05:32)  HR: 95 (01-08-25 @ 09:15)  BP: 117/83 (01-08-25 @ 09:15)  RR: 17 (01-08-25 @ 09:15)  SpO2: 100% (01-08-25 @ 09:15)  Wt(kg): --   CAPILLARY BLOOD GLUCOSE      POCT Blood Glucose.: 163 mg/dL (08 Jan 2025 08:02)      GENERAL: Alert, NAD  Chest: Lungs are clear to P&A, no wheezing, no rales, no ronchi, with good inspiratory effort  Heart: Heart rhythm regular, no murmurs  Abdomen: Soft, non tender, good bowel sounds present in all four quadrants.  No guarding, rebound, and no peritoneal signs.  No evidence of hepatosplenomegaly.  No evidence of abdominal wall hernias.  Inguinal regions are unremarkable with no evidence of hernias.   Extremity: No swelling, or open sores, no gross deformities.  Palpable brachial, radial, and ulnar pulses bilaterally.    Neuro: Alert and oriented x3, motor and sensory intact  Psychiatric: Awake , alert, oriented x3 with an appropriate affect.   Skin: Good color, turgor, texture with no gross lesions, no eruptions, no rashes, no subcutaneous nodules and normal temperature.     I&O's Detail    07 Jan 2025 07:01  -  08 Jan 2025 07:00  --------------------------------------------------------  IN:    Oral Fluid: 360 mL  Total IN: 360 mL    OUT:    Other (mL): 1800 mL  Total OUT: 1800 mL    Total NET: -1440 mL          LABS:                        7.4    8.90  )-----------( 296      ( 08 Jan 2025 08:10 )             21.9               RADIOLOGY & ADDITIONAL STUDIES:    Vein mapping: Pending

## 2025-01-08 NOTE — PROGRESS NOTE ADULT - ASSESSMENT
24 year old female w/ PMHx DM (type I) CKD stage V, HTN, presented to [facility] on 1/4 with mid-epigastric pain, nausea, and vomiting, found to have Cr 10.6 and GFR 5 on admission with persistent elevation throughout hospital course.    Patient now requiring HD through right sided nontunneled IJ catheter. Will plan for AVF HD access during this admission.    Plan:    - Pending discussion with attending, will speak to Dr. Rivero once Vein mapping preformed 24 year old female w/ PMHx DM (type I) CKD stage V, HTN, presented to [facility] on 1/4 with mid-epigastric pain, nausea, and vomiting, found to have Cr 10.6 and GFR 5 on admission with persistent elevation throughout hospital course.    Patient now requiring HD through right sided nontunneled IJ catheter. Will plan for AVF HD access during this admission.    Plan:  - Plan for AVF on Friday (1/10)  - Needs medical   - Pending discussion with attending, will speak to Dr. Rivero once Vein mapping preformed 24 year old female w/ PMHx DM (type I) CKD stage V, HTN, presented to [facility] on 1/4 with mid-epigastric pain, nausea, and vomiting, found to have Cr 10.6 and GFR 5 on admission with persistent elevation throughout hospital course.    Patient now requiring HD through right sided nontunneled IJ catheter. Will plan for AVF HD access during this admission.    Plan:  - Plan for AVF on Friday (1/10)  - Continue HD as scheduled, except Friday (1/10)  - Will discuses with primary team for optimization  - F/u Vein mapping - confirmed with ultrasound ([id number]) that Vein mapping will perform this afternoon after HD  - Case discussed with Dr. Rivero

## 2025-01-08 NOTE — PROGRESS NOTE ADULT - SUBJECTIVE AND OBJECTIVE BOX
Patient is a 24y old  Female who presents with a chief complaint of Nausea, poor po intake, CKD V, Type I DM (08 Jan 2025 10:18)      INTERVAL HPI/OVERNIGHT EVENTS: Overnight no acute events. Seen in HD.    MEDICATIONS  (STANDING):  calcitriol   Capsule 0.25 MICROGram(s) Oral daily  calcium acetate 667 milliGRAM(s) Oral three times a day with meals  chlorhexidine 2% Cloths 1 Application(s) Topical <User Schedule>  dextrose 5%. 1000 milliLiter(s) (100 mL/Hr) IV Continuous <Continuous>  dextrose 5%. 1000 milliLiter(s) (50 mL/Hr) IV Continuous <Continuous>  dextrose 50% Injectable 25 Gram(s) IV Push once  dextrose 50% Injectable 12.5 Gram(s) IV Push once  dextrose 50% Injectable 25 Gram(s) IV Push once  glucagon  Injectable 1 milliGRAM(s) IntraMuscular once  influenza   Vaccine 0.5 milliLiter(s) IntraMuscular once  insulin glargine Injectable (LANTUS) 15 Unit(s) SubCutaneous at bedtime  insulin lispro (ADMELOG) corrective regimen sliding scale   SubCutaneous three times a day before meals  insulin lispro Injectable (ADMELOG) 5 Unit(s) SubCutaneous three times a day before meals  metoprolol tartrate 25 milliGRAM(s) Oral two times a day  pantoprazole    Tablet 40 milliGRAM(s) Oral before breakfast    MEDICATIONS  (PRN):  dextrose Oral Gel 15 Gram(s) Oral once PRN Blood Glucose LESS THAN 70 milliGRAM(s)/deciliter  sodium chloride 0.9% lock flush 10 milliLiter(s) IV Push every 1 hour PRN Pre/post blood products, medications, blood draw, and to maintain line patency      Allergies    No Known Allergies    Intolerances        REVIEW OF SYSTEMS:  ROS negative unless stated otherwise.    Vital Signs Last 24 Hrs  T(C): 36.8 (08 Jan 2025 14:24), Max: 37.3 (08 Jan 2025 05:32)  T(F): 98.2 (08 Jan 2025 14:24), Max: 99.2 (08 Jan 2025 05:32)  HR: 92 (08 Jan 2025 14:24) (90 - 100)  BP: 162/102 (08 Jan 2025 14:24) (117/83 - 162/102)  BP(mean): --  RR: 16 (08 Jan 2025 14:24) (16 - 20)  SpO2: 100% (08 Jan 2025 14:24) (98% - 100%)    Parameters below as of 08 Jan 2025 12:30  Patient On (Oxygen Delivery Method): room air        PHYSICAL EXAM:  GENERAL: NAD  HEAD:  Atraumatic   EYES: EOMI   ENMT: Moist mucous membranes  NECK: Supple   NERVOUS SYSTEM:  Awake  CHEST/LUNG: CTAB   HEART: RRR   ABDOMEN: Soft, Nontender, Nondistended  EXTREMITIES:   No clubbing, cyanosis, or edema  PSYCH: Mood appropriate    LABS:                        7.4    8.90  )-----------( 296      ( 08 Jan 2025 08:10 )             21.9     01-08    137  |  105  |  51[H]  ----------------------------<  148[H]  3.8   |  26  |  6.00[H]    Ca    7.8[L]      08 Jan 2025 08:10  Phos  3.8     01-08        Urinalysis Basic - ( 08 Jan 2025 08:10 )    Color: x / Appearance: x / SG: x / pH: x  Gluc: 148 mg/dL / Ketone: x  / Bili: x / Urobili: x   Blood: x / Protein: x / Nitrite: x   Leuk Esterase: x / RBC: x / WBC x   Sq Epi: x / Non Sq Epi: x / Bacteria: x      CAPILLARY BLOOD GLUCOSE      POCT Blood Glucose.: 121 mg/dL (08 Jan 2025 14:36)  POCT Blood Glucose.: 163 mg/dL (08 Jan 2025 08:02)  POCT Blood Glucose.: 278 mg/dL (07 Jan 2025 21:52)  POCT Blood Glucose.: 249 mg/dL (07 Jan 2025 19:09)      RADIOLOGY & ADDITIONAL TESTS:    Imaging Personally Reviewed:  [ X] YES  [ ] NO    Consultant(s) Notes Reviewed:  [ X] YES  [ ] NO    Care Discussed with Consultants/Other Providers [X ] YES  [ ] NO

## 2025-01-09 LAB
ANION GAP SERPL CALC-SCNC: 7 MMOL/L — SIGNIFICANT CHANGE UP (ref 5–17)
BUN SERPL-MCNC: 49 MG/DL — HIGH (ref 7–23)
CALCIUM SERPL-MCNC: 8.1 MG/DL — LOW (ref 8.5–10.1)
CHLORIDE SERPL-SCNC: 103 MMOL/L — SIGNIFICANT CHANGE UP (ref 96–108)
CO2 SERPL-SCNC: 25 MMOL/L — SIGNIFICANT CHANGE UP (ref 22–31)
CREAT SERPL-MCNC: 5.65 MG/DL — HIGH (ref 0.5–1.3)
EGFR: 10 ML/MIN/1.73M2 — LOW
GLUCOSE BLDC GLUCOMTR-MCNC: 195 MG/DL — HIGH (ref 70–99)
GLUCOSE BLDC GLUCOMTR-MCNC: 283 MG/DL — HIGH (ref 70–99)
GLUCOSE BLDC GLUCOMTR-MCNC: 324 MG/DL — HIGH (ref 70–99)
GLUCOSE BLDC GLUCOMTR-MCNC: 340 MG/DL — HIGH (ref 70–99)
GLUCOSE SERPL-MCNC: 301 MG/DL — HIGH (ref 70–99)
HCT VFR BLD CALC: 22.5 % — LOW (ref 34.5–45)
HGB BLD-MCNC: 7.3 G/DL — LOW (ref 11.5–15.5)
MCHC RBC-ENTMCNC: 29.9 PG — SIGNIFICANT CHANGE UP (ref 27–34)
MCHC RBC-ENTMCNC: 32.4 G/DL — SIGNIFICANT CHANGE UP (ref 32–36)
MCV RBC AUTO: 92.2 FL — SIGNIFICANT CHANGE UP (ref 80–100)
NRBC # BLD: 0 /100 WBCS — SIGNIFICANT CHANGE UP (ref 0–0)
PLATELET # BLD AUTO: 264 K/UL — SIGNIFICANT CHANGE UP (ref 150–400)
POTASSIUM SERPL-MCNC: 3.8 MMOL/L — SIGNIFICANT CHANGE UP (ref 3.5–5.3)
POTASSIUM SERPL-SCNC: 3.8 MMOL/L — SIGNIFICANT CHANGE UP (ref 3.5–5.3)
RBC # BLD: 2.44 M/UL — LOW (ref 3.8–5.2)
RBC # FLD: 12 % — SIGNIFICANT CHANGE UP (ref 10.3–14.5)
SODIUM SERPL-SCNC: 135 MMOL/L — SIGNIFICANT CHANGE UP (ref 135–145)
WBC # BLD: 8.7 K/UL — SIGNIFICANT CHANGE UP (ref 3.8–10.5)
WBC # FLD AUTO: 8.7 K/UL — SIGNIFICANT CHANGE UP (ref 3.8–10.5)

## 2025-01-09 PROCEDURE — 99232 SBSQ HOSP IP/OBS MODERATE 35: CPT

## 2025-01-09 RX ORDER — EPOETIN ALFA 2000 [IU]/ML
10000 SOLUTION INTRAVENOUS; SUBCUTANEOUS
Refills: 0 | Status: DISCONTINUED | OUTPATIENT
Start: 2025-01-10 | End: 2025-01-10

## 2025-01-09 RX ORDER — INSULIN GLARGINE-YFGN 100 [IU]/ML
18 INJECTION, SOLUTION SUBCUTANEOUS AT BEDTIME
Refills: 0 | Status: DISCONTINUED | OUTPATIENT
Start: 2025-01-09 | End: 2025-01-11

## 2025-01-09 RX ORDER — GINKGO BILOBA 40 MG
3 CAPSULE ORAL AT BEDTIME
Refills: 0 | Status: DISCONTINUED | OUTPATIENT
Start: 2025-01-09 | End: 2025-01-13

## 2025-01-09 RX ORDER — INSULIN LISPRO 100/ML
6 VIAL (ML) SUBCUTANEOUS
Refills: 0 | Status: DISCONTINUED | OUTPATIENT
Start: 2025-01-09 | End: 2025-01-11

## 2025-01-09 RX ORDER — INSULIN LISPRO 100/ML
10 VIAL (ML) SUBCUTANEOUS ONCE
Refills: 0 | Status: COMPLETED | OUTPATIENT
Start: 2025-01-09 | End: 2025-01-09

## 2025-01-09 RX ADMIN — Medication 8: at 07:01

## 2025-01-09 RX ADMIN — Medication 5 UNIT(S): at 12:06

## 2025-01-09 RX ADMIN — Medication 25 MILLIGRAM(S): at 05:24

## 2025-01-09 RX ADMIN — CALCIUM ACETATE 667 MILLIGRAM(S): 667 CAPSULE ORAL at 17:50

## 2025-01-09 RX ADMIN — Medication 10 UNIT(S): at 22:03

## 2025-01-09 RX ADMIN — CALCIUM ACETATE 667 MILLIGRAM(S): 667 CAPSULE ORAL at 09:06

## 2025-01-09 RX ADMIN — INSULIN GLARGINE-YFGN 18 UNIT(S): 100 INJECTION, SOLUTION SUBCUTANEOUS at 21:17

## 2025-01-09 RX ADMIN — PANTOPRAZOLE 40 MILLIGRAM(S): 40 TABLET, DELAYED RELEASE ORAL at 05:24

## 2025-01-09 RX ADMIN — Medication 25 MILLIGRAM(S): at 17:51

## 2025-01-09 RX ADMIN — CALCIUM ACETATE 667 MILLIGRAM(S): 667 CAPSULE ORAL at 12:05

## 2025-01-09 RX ADMIN — Medication 3 MILLIGRAM(S): at 22:03

## 2025-01-09 RX ADMIN — CHLORHEXIDINE GLUCONATE 1 APPLICATION(S): 1.2 RINSE ORAL at 05:26

## 2025-01-09 RX ADMIN — Medication 5 UNIT(S): at 07:02

## 2025-01-09 RX ADMIN — Medication 6: at 12:05

## 2025-01-09 RX ADMIN — CALCITRIOL 0.25 MICROGRAM(S): 0.5 CAPSULE, LIQUID FILLED ORAL at 12:05

## 2025-01-09 NOTE — PROGRESS NOTE ADULT - ASSESSMENT
24F w/ PMHx T1DM, CKD stage V, HTN admitted with pain, N/V, Cr 10.6 now 5.65 today.  Patient requiring HD through right nontunneled IJ catheter.  Vein mapping performed yesterday.    Plan:  - OR for AVF creation tomorrow 1/10; booked and consented  - NPO after midnight  - Pre-op labs ordered  - Continue HD    Will discuss with vascular surgery attending, final plan to follow   24F w/ PMHx T1DM, CKD stage V, HTN admitted with pain, N/V, Cr 10.6 now 5.65 today.  Patient requiring HD through right nontunneled IJ catheter.  Vein mapping performed yesterday.    Plan as discussed with Dr. Rivero:  - OR for AVF creation tomorrow 1/10; booked and consented  - No hemodialysis tomorrow (1/10) before surgery  - NPO after midnight  - Pre-op labs ordered  - Continue HD per schedule, except Friday 1/10

## 2025-01-09 NOTE — PROGRESS NOTE ADULT - SUBJECTIVE AND OBJECTIVE BOX
Patient is a 24y old  Female who presents with a chief complaint of Nausea, poor po intake, CKD V, Type I DM (09 Jan 2025 13:57)      INTERVAL HPI/OVERNIGHT EVENTS: No acute events overnight. Resting comfortably.     MEDICATIONS  (STANDING):  calcitriol   Capsule 0.25 MICROGram(s) Oral daily  calcium acetate 667 milliGRAM(s) Oral three times a day with meals  chlorhexidine 2% Cloths 1 Application(s) Topical <User Schedule>  dextrose 5%. 1000 milliLiter(s) (100 mL/Hr) IV Continuous <Continuous>  dextrose 5%. 1000 milliLiter(s) (50 mL/Hr) IV Continuous <Continuous>  dextrose 50% Injectable 25 Gram(s) IV Push once  dextrose 50% Injectable 12.5 Gram(s) IV Push once  dextrose 50% Injectable 25 Gram(s) IV Push once  glucagon  Injectable 1 milliGRAM(s) IntraMuscular once  influenza   Vaccine 0.5 milliLiter(s) IntraMuscular once  insulin glargine Injectable (LANTUS) 18 Unit(s) SubCutaneous at bedtime  insulin lispro (ADMELOG) corrective regimen sliding scale   SubCutaneous three times a day before meals  insulin lispro Injectable (ADMELOG) 6 Unit(s) SubCutaneous three times a day before meals  metoprolol tartrate 25 milliGRAM(s) Oral two times a day  pantoprazole    Tablet 40 milliGRAM(s) Oral before breakfast    MEDICATIONS  (PRN):  dextrose Oral Gel 15 Gram(s) Oral once PRN Blood Glucose LESS THAN 70 milliGRAM(s)/deciliter  sodium chloride 0.9% lock flush 10 milliLiter(s) IV Push every 1 hour PRN Pre/post blood products, medications, blood draw, and to maintain line patency      Allergies    No Known Allergies    Intolerances        REVIEW OF SYSTEMS:  ROS negative unless stated otherwise.    Vital Signs Last 24 Hrs  T(C): 36.7 (09 Jan 2025 11:02), Max: 37.3 (08 Jan 2025 23:38)  T(F): 98 (09 Jan 2025 11:02), Max: 99.1 (08 Jan 2025 23:38)  HR: 95 (09 Jan 2025 11:02) (92 - 95)  BP: 122/76 (09 Jan 2025 11:02) (122/76 - 164/106)  BP(mean): --  RR: 17 (09 Jan 2025 11:02) (16 - 20)  SpO2: 100% (09 Jan 2025 11:02) (98% - 100%)    Parameters below as of 09 Jan 2025 11:02  Patient On (Oxygen Delivery Method): room air        PHYSICAL EXAM:  GENERAL: NAD  HEAD:  Atraumatic   EYES: EOMI   ENMT: Moist mucous membranes  NECK: Supple   NERVOUS SYSTEM:  Awake  CHEST/LUNG: CTAB   HEART: RRR   ABDOMEN: Soft, Nontender, Nondistended  EXTREMITIES:   No clubbing, cyanosis, or edema  PSYCH: Mood appropriate    LABS:                        7.3    8.70  )-----------( 264      ( 09 Jan 2025 07:22 )             22.5     01-09    135  |  103  |  49[H]  ----------------------------<  301[H]  3.8   |  25  |  5.65[H]    Ca    8.1[L]      09 Jan 2025 07:22  Phos  3.8     01-08        Urinalysis Basic - ( 09 Jan 2025 07:22 )    Color: x / Appearance: x / SG: x / pH: x  Gluc: 301 mg/dL / Ketone: x  / Bili: x / Urobili: x   Blood: x / Protein: x / Nitrite: x   Leuk Esterase: x / RBC: x / WBC x   Sq Epi: x / Non Sq Epi: x / Bacteria: x      CAPILLARY BLOOD GLUCOSE      POCT Blood Glucose.: 283 mg/dL (09 Jan 2025 11:27)  POCT Blood Glucose.: 324 mg/dL (09 Jan 2025 06:57)  POCT Blood Glucose.: 202 mg/dL (08 Jan 2025 21:39)  POCT Blood Glucose.: 211 mg/dL (08 Jan 2025 16:21)  POCT Blood Glucose.: 121 mg/dL (08 Jan 2025 14:36)      RADIOLOGY & ADDITIONAL TESTS:    Imaging Personally Reviewed:  [ X] YES  [ ] NO    Consultant(s) Notes Reviewed:  [ X] YES  [ ] NO    Care Discussed with Consultants/Other Providers [X ] YES  [ ] NO

## 2025-01-09 NOTE — CONSULT NOTE ADULT - SUBJECTIVE AND OBJECTIVE BOX
Interventional Radiology    Evaluate for Procedure: [facility]    HPI: 24y Female with PMH DM(Type I), CKD V,  HTN  presents with c/o 3 days of generalized abdominal pain, nausea, diarrhea. Patient also c/o midsternal CP and SOB. KIKE on CKD(5) - prerenal azotemia, ATN vs rapid progression of diabetic nephropathy with malignant proteinuria. HD initiated this admission, s/p nontunneled HD catheter on 1/6. IR consulted for conversion to tunneled HD catheter.     Allergies: No Known Allergies    Medications (Abx/Cardiac/Anticoagulation/Blood Products)    metoprolol tartrate: 25 milliGRAM(s) Oral (01-09 @ 05:24)    Data:    T(C): 36.7  HR: 95  BP: 122/76  RR: 17  SpO2: 100%    -WBC 8.70 / HgB 7.3 / Hct 22.5 / Plt 264  -Na 135 / Cl 103 / BUN 49 / Glucose 301  -K 3.8 / CO2 25 / Cr 5.65  -ALT -- / Alk Phos -- / T.Bili --    Radiology: Reviewed    Assessment/Plan:   -24y Female with PMH DM(Type I), CKD V,  HTN  presents with c/o 3 days of generalized abdominal pain, nausea, diarrhea. Patient also c/o midsternal CP and SOB. KIKE on CKD(5) - prerenal azotemia, ATN vs rapid progression of diabetic nephropathy with malignant proteinuria. HD initiated this admission, s/p nontunneled HD catheter on 1/6. IR consulted for conversion to tunneled HD catheter.       - Patient planned for OR tomorrow for AVF creation  - Will plan for tunneled HD catheter placement on Monday 1/13  - please place IR procedure order under [place]  - STAT labs in AM (cbc,coags, bmp, T&S)  - hold AC day of procedure  - NPO after midnight on Sunday  - d/w Dr. Cosme      Interventional Radiology  ------------------------------------  For emergent consults, please call [phone], or call or message on TEAMs.   For non-emergent consults, consults after hours or over the weekend, please place IR Consult order.

## 2025-01-09 NOTE — PROGRESS NOTE ADULT - SUBJECTIVE AND OBJECTIVE BOX
NEPHROLOGY PROGRESS NOTE    CHIEF COMPLAINT:  ESRD    HPI:  Feels better overall since starting HD.    EXAM:  Vital Signs Last 24 Hrs  T(C): 36.7 (09 Jan 2025 11:02), Max: 37.3 (08 Jan 2025 23:38)  T(F): 98 (09 Jan 2025 11:02), Max: 99.1 (08 Jan 2025 23:38)  HR: 95 (09 Jan 2025 11:02) (92 - 95)  BP: 122/76 (09 Jan 2025 11:02) (122/76 - 164/106)  BP(mean): --  RR: 17 (09 Jan 2025 11:02) (16 - 20)  SpO2: 100% (09 Jan 2025 11:02) (98% - 100%)    Parameters below as of 09 Jan 2025 11:02  Patient On (Oxygen Delivery Method): room air      I&O's Summary    08 Jan 2025 07:01  -  09 Jan 2025 07:00  --------------------------------------------------------  IN: 0 mL / OUT: 1000 mL / NET: -1000 mL        Conversant, in no apparent distress  Normal respiratory effort, lungs clear bilaterally  Heart RRR with no murmur, no peripheral edema    LABS                        7.3    8.70  )-----------( 264      ( 09 Jan 2025 07:22 )             22.5     01-09    135  |  103  |  49[H]  ----------------------------<  301[H]  3.8   |  25  |  5.65[H]    Ca    8.1[L]      09 Jan 2025 07:22  Phos  3.8     01-08      Assessment   New ESRD     Plan:  HD to resume tomorrow  Perm cath, AVF for Friday  Discharge planning   Retacrit support

## 2025-01-09 NOTE — PROGRESS NOTE ADULT - ASSESSMENT
23 yo female PMH DM(Type I), CKD V,  HTN  presents with c/o 3 days of generalized abdominal pain, nausea, diarrhea. Patient also c/o midsternal CP and SOB.  Patient denies fevers, cough, dysuria. Patient reports she has been using normal insulin amount during illness.  on arrival to ER. Has CKD V at baseline, was told may   need to start HD sessions in near future.        Plan:  Admit to tele for nausea, decreased PO intake. Has CKD V at baseline, likely has early signs of uremia. No asterixis on exam.  Appreciate renal consult.     IVF stopped. Glucose today acceptable 195, increase Lantus 15, Admelog 5 units tid, A1C in AM, finger sticks every 4 hours for 24 hours.     A1C is 8.3, average control.     Beta-hydroxybuterate .50, mild elevation, anion gap normal. No DKA. Phos elevated 6.4, will start Phoslo tid.     Creatinine 10.0 on arrival, agree renal sonogram to confirm MRD. HGB acceptable at 10.4, CXR is clear.     BP well controlled now on Lopressor 25 mg bid.         Renal sono notes MRD, large protinuria noted.     Started on HD, vein mapping to be done, and permacath placement for Friday.     Outpatient center is set up as patient is new to HD.

## 2025-01-09 NOTE — PROGRESS NOTE ADULT - SUBJECTIVE AND OBJECTIVE BOX
Patient seen and examined bedside resting comfortably.  Vein mapping performed yesterday.  No complaints offered.   Denies nausea and vomiting. Tolerating diet.  Denies chest pain, dyspnea, cough.    T(F): 98 (01-09-25 @ 11:02), Max: 99.1 (01-08-25 @ 23:38)  HR: 95 (01-09-25 @ 11:02) (92 - 95)  BP: 122/76 (01-09-25 @ 11:02) (122/76 - 164/106)  RR: 17 (01-09-25 @ 11:02) (16 - 20)  SpO2: 100% (01-09-25 @ 11:02) (98% - 100%)  Wt(kg): --  CAPILLARY BLOOD GLUCOSE      POCT Blood Glucose.: 283 mg/dL (09 Jan 2025 11:27)  POCT Blood Glucose.: 324 mg/dL (09 Jan 2025 06:57)  POCT Blood Glucose.: 202 mg/dL (08 Jan 2025 21:39)  POCT Blood Glucose.: 211 mg/dL (08 Jan 2025 16:21)  POCT Blood Glucose.: 121 mg/dL (08 Jan 2025 14:36)      PHYSICAL EXAM:  General: NAD, lying in bed  Neuro:  Alert & oriented x 3  HEENT: NCAT, EOMI, conjunctiva clear  CV: RRR  Lung: respirations nonlabored, good inspiratory effort  Abdomen: soft, NTND  Extremities: no pedal edema or calf tenderness noted   Pulses: palpable radial, ulnar, brachial pulses b/l    LABS:                        7.3    8.70  )-----------( 264      ( 09 Jan 2025 07:22 )             22.5     01-09    135  |  103  |  49[H]  ----------------------------<  301[H]  3.8   |  25  |  5.65[H]    Ca    8.1[L]      09 Jan 2025 07:22  Phos  3.8     01-08          I&O:         Impression: 24y Female admitted with ABDOMINAL PAIN      PMH   DM (diabetes mellitus), type 1    Chronic kidney disease, unspecified CKD stage    HTN (hypertension)        Plan:  -continue VTE prophylaxis   -Increase activity with PT, OOB, Ambulate  -educated on proper incentive spirometry use  -continue local wound care  -f/u AM labs  -will discuss with surgical attending Patient seen and examined bedside resting comfortably.  Vein mapping performed yesterday.  No complaints offered.   Denies nausea and vomiting. Tolerating diet.  Denies chest pain, dyspnea, cough.    T(F): 98 (01-09-25 @ 11:02), Max: 99.1 (01-08-25 @ 23:38)  HR: 95 (01-09-25 @ 11:02) (92 - 95)  BP: 122/76 (01-09-25 @ 11:02) (122/76 - 164/106)  RR: 17 (01-09-25 @ 11:02) (16 - 20)  SpO2: 100% (01-09-25 @ 11:02) (98% - 100%)  Wt(kg): --  CAPILLARY BLOOD GLUCOSE      POCT Blood Glucose.: 283 mg/dL (09 Jan 2025 11:27)  POCT Blood Glucose.: 324 mg/dL (09 Jan 2025 06:57)  POCT Blood Glucose.: 202 mg/dL (08 Jan 2025 21:39)  POCT Blood Glucose.: 211 mg/dL (08 Jan 2025 16:21)  POCT Blood Glucose.: 121 mg/dL (08 Jan 2025 14:36)      PHYSICAL EXAM:  General: NAD, lying in bed  Neuro:  Alert & oriented x 3  HEENT: NCAT, EOMI, conjunctiva clear  CV: RRR  Lung: respirations nonlabored, good inspiratory effort  Abdomen: soft, NTND  Extremities: no pedal edema or calf tenderness noted   Pulses: palpable radial, ulnar, brachial pulses b/l    LABS:                        7.3    8.70  )-----------( 264      ( 09 Jan 2025 07:22 )             22.5     01-09    135  |  103  |  49[H]  ----------------------------<  301[H]  3.8   |  25  |  5.65[H]    Ca    8.1[L]      09 Jan 2025 07:22  Phos  3.8     01-08

## 2025-01-10 LAB
ANION GAP SERPL CALC-SCNC: 8 MMOL/L — SIGNIFICANT CHANGE UP (ref 5–17)
APTT BLD: 26 SEC — SIGNIFICANT CHANGE UP (ref 24.5–35.6)
BUN SERPL-MCNC: 74 MG/DL — HIGH (ref 7–23)
CALCIUM SERPL-MCNC: 7.8 MG/DL — LOW (ref 8.5–10.1)
CHLORIDE SERPL-SCNC: 100 MMOL/L — SIGNIFICANT CHANGE UP (ref 96–108)
CO2 SERPL-SCNC: 24 MMOL/L — SIGNIFICANT CHANGE UP (ref 22–31)
CREAT SERPL-MCNC: 7.31 MG/DL — HIGH (ref 0.5–1.3)
EGFR: 7 ML/MIN/1.73M2 — LOW
GLUCOSE BLDC GLUCOMTR-MCNC: 145 MG/DL — HIGH (ref 70–99)
GLUCOSE BLDC GLUCOMTR-MCNC: 149 MG/DL — HIGH (ref 70–99)
GLUCOSE BLDC GLUCOMTR-MCNC: 238 MG/DL — HIGH (ref 70–99)
GLUCOSE BLDC GLUCOMTR-MCNC: 313 MG/DL — HIGH (ref 70–99)
GLUCOSE BLDC GLUCOMTR-MCNC: 405 MG/DL — HIGH (ref 70–99)
GLUCOSE BLDC GLUCOMTR-MCNC: 440 MG/DL — HIGH (ref 70–99)
GLUCOSE BLDC GLUCOMTR-MCNC: 444 MG/DL — HIGH (ref 70–99)
GLUCOSE SERPL-MCNC: 411 MG/DL — HIGH (ref 70–99)
HCG SERPL-ACNC: <1 MIU/ML — SIGNIFICANT CHANGE UP
HCT VFR BLD CALC: 20.6 % — CRITICAL LOW (ref 34.5–45)
HGB BLD-MCNC: 6.8 G/DL — CRITICAL LOW (ref 11.5–15.5)
INR BLD: 0.88 RATIO — SIGNIFICANT CHANGE UP (ref 0.85–1.16)
MAGNESIUM SERPL-MCNC: 1.7 MG/DL — SIGNIFICANT CHANGE UP (ref 1.6–2.6)
MCHC RBC-ENTMCNC: 30.8 PG — SIGNIFICANT CHANGE UP (ref 27–34)
MCHC RBC-ENTMCNC: 33 G/DL — SIGNIFICANT CHANGE UP (ref 32–36)
MCV RBC AUTO: 93.2 FL — SIGNIFICANT CHANGE UP (ref 80–100)
MRSA PCR RESULT.: SIGNIFICANT CHANGE UP
NRBC # BLD: 0 /100 WBCS — SIGNIFICANT CHANGE UP (ref 0–0)
PHOSPHATE SERPL-MCNC: 4.5 MG/DL — SIGNIFICANT CHANGE UP (ref 2.5–4.5)
PLATELET # BLD AUTO: 291 K/UL — SIGNIFICANT CHANGE UP (ref 150–400)
POTASSIUM SERPL-MCNC: 3.8 MMOL/L — SIGNIFICANT CHANGE UP (ref 3.5–5.3)
POTASSIUM SERPL-SCNC: 3.8 MMOL/L — SIGNIFICANT CHANGE UP (ref 3.5–5.3)
PROTHROM AB SERPL-ACNC: 10 SEC — SIGNIFICANT CHANGE UP (ref 9.9–13.4)
RBC # BLD: 2.21 M/UL — LOW (ref 3.8–5.2)
RBC # FLD: 12.2 % — SIGNIFICANT CHANGE UP (ref 10.3–14.5)
S AUREUS DNA NOSE QL NAA+PROBE: DETECTED
SODIUM SERPL-SCNC: 132 MMOL/L — LOW (ref 135–145)
WBC # BLD: 8.72 K/UL — SIGNIFICANT CHANGE UP (ref 3.8–10.5)
WBC # FLD AUTO: 8.72 K/UL — SIGNIFICANT CHANGE UP (ref 3.8–10.5)

## 2025-01-10 PROCEDURE — 99232 SBSQ HOSP IP/OBS MODERATE 35: CPT

## 2025-01-10 RX ORDER — INSULIN NPH HUMAN ISOPHANE 100/ML
8 VIAL (ML) SUBCUTANEOUS ONCE
Refills: 0 | Status: COMPLETED | OUTPATIENT
Start: 2025-01-10 | End: 2025-01-10

## 2025-01-10 RX ORDER — IRON SUCROSE 20 MG/ML
100 INJECTION, SOLUTION INTRAVENOUS ONCE
Refills: 0 | Status: COMPLETED | OUTPATIENT
Start: 2025-01-10 | End: 2025-01-10

## 2025-01-10 RX ORDER — IRON SUCROSE 20 MG/ML
100 INJECTION, SOLUTION INTRAVENOUS ONCE
Refills: 0 | Status: DISCONTINUED | OUTPATIENT
Start: 2025-01-10 | End: 2025-01-10

## 2025-01-10 RX ORDER — INSULIN LISPRO 100/ML
VIAL (ML) SUBCUTANEOUS AT BEDTIME
Refills: 0 | Status: DISCONTINUED | OUTPATIENT
Start: 2025-01-10 | End: 2025-01-13

## 2025-01-10 RX ORDER — EPOETIN ALFA 2000 [IU]/ML
10000 SOLUTION INTRAVENOUS; SUBCUTANEOUS
Refills: 0 | Status: DISCONTINUED | OUTPATIENT
Start: 2025-01-10 | End: 2025-01-13

## 2025-01-10 RX ORDER — INSULIN LISPRO 100/ML
6 VIAL (ML) SUBCUTANEOUS ONCE
Refills: 0 | Status: COMPLETED | OUTPATIENT
Start: 2025-01-10 | End: 2025-01-10

## 2025-01-10 RX ADMIN — Medication 6 UNIT(S): at 08:25

## 2025-01-10 RX ADMIN — Medication 8: at 11:42

## 2025-01-10 RX ADMIN — PANTOPRAZOLE 40 MILLIGRAM(S): 40 TABLET, DELAYED RELEASE ORAL at 05:17

## 2025-01-10 RX ADMIN — CALCIUM ACETATE 667 MILLIGRAM(S): 667 CAPSULE ORAL at 16:57

## 2025-01-10 RX ADMIN — Medication 25 MILLIGRAM(S): at 16:57

## 2025-01-10 RX ADMIN — Medication 6 UNIT(S): at 16:56

## 2025-01-10 RX ADMIN — Medication 25 MILLIGRAM(S): at 05:17

## 2025-01-10 RX ADMIN — CHLORHEXIDINE GLUCONATE 1 APPLICATION(S): 1.2 RINSE ORAL at 05:24

## 2025-01-10 RX ADMIN — IRON SUCROSE 210 MILLIGRAM(S): 20 INJECTION, SOLUTION INTRAVENOUS at 15:27

## 2025-01-10 RX ADMIN — INSULIN GLARGINE-YFGN 18 UNIT(S): 100 INJECTION, SOLUTION SUBCUTANEOUS at 22:02

## 2025-01-10 RX ADMIN — CALCITRIOL 0.25 MICROGRAM(S): 0.5 CAPSULE, LIQUID FILLED ORAL at 13:38

## 2025-01-10 NOTE — PROGRESS NOTE ADULT - SUBJECTIVE AND OBJECTIVE BOX
[facility] NEPHROLOGY SERVICES, [facility]  NEPHROLOGY AND HYPERTENSION  [address]  [address]  [place] [address]  [phone]    MD ANTONI JEFFERSON MD YELENA ROSENBERG, MD BINNY KOSHY, MD CHRISTOPHER CAPUTO, MD EDWARD BOVER, MD          Patient events noted    MEDICATIONS  (STANDING):  calcitriol   Capsule 0.25 MICROGram(s) Oral daily  calcium acetate 667 milliGRAM(s) Oral three times a day with meals  chlorhexidine 2% Cloths 1 Application(s) Topical <User Schedule>  dextrose 5%. 1000 milliLiter(s) (100 mL/Hr) IV Continuous <Continuous>  dextrose 5%. 1000 milliLiter(s) (50 mL/Hr) IV Continuous <Continuous>  dextrose 50% Injectable 25 Gram(s) IV Push once  dextrose 50% Injectable 12.5 Gram(s) IV Push once  dextrose 50% Injectable 25 Gram(s) IV Push once  epoetin elif-epbx (RETACRIT) Injectable 68150 Unit(s) IV Push <User Schedule>  glucagon  Injectable 1 milliGRAM(s) IntraMuscular once  influenza   Vaccine 0.5 milliLiter(s) IntraMuscular once  insulin glargine Injectable (LANTUS) 18 Unit(s) SubCutaneous at bedtime  insulin lispro (ADMELOG) corrective regimen sliding scale   SubCutaneous three times a day before meals  insulin lispro Injectable (ADMELOG) 6 Unit(s) SubCutaneous three times a day before meals  metoprolol tartrate 25 milliGRAM(s) Oral two times a day  pantoprazole    Tablet 40 milliGRAM(s) Oral before breakfast    MEDICATIONS  (PRN):  dextrose Oral Gel 15 Gram(s) Oral once PRN Blood Glucose LESS THAN 70 milliGRAM(s)/deciliter  melatonin 3 milliGRAM(s) Oral at bedtime PRN Sleep  sodium chloride 0.9% lock flush 10 milliLiter(s) IV Push every 1 hour PRN Pre/post blood products, medications, blood draw, and to maintain line patency      01-09-25 @ 07:01  -  01-10-25 @ 07:00  --------------------------------------------------------  IN: 760 mL / OUT: 0 mL / NET: 760 mL      PHYSICAL EXAM:      T(C): 36.5 (01-10-25 @ 17:19), Max: 36.9 (01-10-25 @ 11:34)  HR: 66 (01-10-25 @ 17:19) (66 - 98)  BP: 127/85 (01-10-25 @ 17:19) (125/85 - 159/98)  RR: 18 (01-10-25 @ 17:19) (17 - 18)  SpO2: 96% (01-10-25 @ 17:19) (96% - 100%)  Wt(kg): --  Lungs clear  Heart S1S2  Abd soft NT ND  Extremities:   tr edema                                    6.8    8.72  )-----------( 291      ( 10 Eduin 2025 07:16 )             20.6     01-10    132[L]  |  100  |  74[H]  ----------------------------<  411[H]  3.8   |  24  |  7.31[H]    Ca    7.8[L]      10 Eduin 2025 07:16  Phos  4.5     01-10  Mg     1.7     01-10          Creatinine Trend: 7.31<--, 5.65<--, 6.00<--, 10.80<--, 10.40<--, 10.40<--            Assessment   New ESRD     Plan:  HD for tomorrow  PRBC if Hgb < 7  Perm cath Monday   Discharge planning Monday post perm cath   Retacrit support         Selvin Cosme MD

## 2025-01-10 NOTE — PROGRESS NOTE ADULT - NS ATTEND AMEND GEN_ALL_CORE FT
ESRD on HD  via temp cath right IJV    scheduled for left arm fistula creation with possible permcath insertion today  procedure cancelled by anesthesia due to uncontrolled hyperglycemia  anesthesia requesting endocrinology consult and cardiology consult and optimization prior to proceeding
CKD, now initiated on HD this admission  Currently via right sided temp hd cath  right hand dominant  no prior history of central venous catheters  palp brachial, radial pulses bilaterally. chantale's normal bilaterally.    plan for AV access creation this admission (on OR schedule Friday 1/10/2025)  vein mapping bue reviewed. left basilic vein appears to be of adequate caliber.   left arm precautions - no ivs, venipuncture left arm  medical optimization  Risks, benefits, and alternatives of the procedure were discussed with the patient and family. All questions were answered. They agree to proceed with the procedure.
CKD, now initiated on HD this admission  Currently via right sided temp hd cath  right hand dominant  no prior history of central venous catheters  palp brachial, radial pulses bilaterally. chantale's normal bilaterally.    plan for AV access creation this admission (on OR schedule Friday 1/10/2025)  vein mapping bue  left arm precautions - no ivs, venipuncture left arm  medical optimization

## 2025-01-10 NOTE — PROVIDER CONTACT NOTE (CRITICAL VALUE NOTIFICATION) - ACTION/TREATMENT ORDERED:
Michelle Santos PA-C notified & made aware. Replete K+ & Mg+, awaiting orders.
blood will be ordered, however patient is refusing to get blood.  As per patient, she prefers to get iron infusion instead. She is being extra careful since she is on the kidney transplant waiting list

## 2025-01-10 NOTE — DIETITIAN INITIAL EVALUATION ADULT - PERTINENT LABORATORY DATA
01-10    132[L]  |  100  |  74[H]  ----------------------------<  411[H]  3.8   |  24  |  7.31[H]    Ca    7.8[L]      10 Eduin 2025 07:16  Phos  4.5     01-10  Mg     1.7     01-10    POCT Blood Glucose.: 313 mg/dL (01-10-25); 01-09 324, 283, 195, 340  A1C with Estimated Average Glucose Result: 8.3 %, 192 (01-05-25)

## 2025-01-10 NOTE — PROGRESS NOTE ADULT - SUBJECTIVE AND OBJECTIVE BOX
Team Surgery Preop Note    Patient is a 24y old  Female who presents with a chief complaint of Nausea, poor po intake, CKD V, Type I DM (09 Jan 2025 14:30)    Diagnosis: CKD V  Procedure: Left arm arteriovenous fistula creation  Surgeon: Dr. Rivero                          7.3    8.70  )-----------( 264      ( 09 Jan 2025 07:22 )             22.5     01-10    132[L]  |  100  |  74[H]  ----------------------------<  411[H]  3.8   |  24  |  7.31[H]    Ca    7.8[L]      10 Eduin 2025 07:16      PT/INR - ( 10 Eduin 2025 07:16 )   PT: 10.0 sec;   INR: 0.88 ratio         PTT - ( 10 Eduin 2025 07:16 )  PTT:26.0 sec  Urinalysis Basic - ( 10 Eduin 2025 07:16 )    Color: x / Appearance: x / SG: x / pH: x  Gluc: 411 mg/dL / Ketone: x  / Bili: x / Urobili: x   Blood: x / Protein: x / Nitrite: x   Leuk Esterase: x / RBC: x / WBC x   Sq Epi: x / Non Sq Epi: x / Bacteria: x        [x ] Type & Screen - A +, Antibody negative  [x ] CBC  [x ] BMP  [x ] PT/PTT/INR - PT 10, INR .88, aPTT 26  [x ] HCG  [x ] EKG  [x ] NPO/IVF  [x ] Consent  [x ] Clearance  [x ] Booked for OR at 1:30

## 2025-01-10 NOTE — PROGRESS NOTE ADULT - SUBJECTIVE AND OBJECTIVE BOX
Patient is a 24y old  Female who presents with a chief complaint of ABDOMINAL PAIN     (10 Eduin 2025 11:24)      INTERVAL HPI/OVERNIGHT EVENTS: Overnight no acute events. Procedure cancelled.     MEDICATIONS  (STANDING):  calcitriol   Capsule 0.25 MICROGram(s) Oral daily  calcium acetate 667 milliGRAM(s) Oral three times a day with meals  chlorhexidine 2% Cloths 1 Application(s) Topical <User Schedule>  dextrose 5%. 1000 milliLiter(s) (100 mL/Hr) IV Continuous <Continuous>  dextrose 5%. 1000 milliLiter(s) (50 mL/Hr) IV Continuous <Continuous>  dextrose 50% Injectable 25 Gram(s) IV Push once  dextrose 50% Injectable 12.5 Gram(s) IV Push once  dextrose 50% Injectable 25 Gram(s) IV Push once  epoetin elif-epbx (RETACRIT) Injectable 99662 Unit(s) IV Push <User Schedule>  glucagon  Injectable 1 milliGRAM(s) IntraMuscular once  influenza   Vaccine 0.5 milliLiter(s) IntraMuscular once  insulin glargine Injectable (LANTUS) 18 Unit(s) SubCutaneous at bedtime  insulin lispro (ADMELOG) corrective regimen sliding scale   SubCutaneous three times a day before meals  insulin lispro Injectable (ADMELOG) 6 Unit(s) SubCutaneous three times a day before meals  iron sucrose IVPB 100 milliGRAM(s) IV Intermittent once  metoprolol tartrate 25 milliGRAM(s) Oral two times a day  pantoprazole    Tablet 40 milliGRAM(s) Oral before breakfast    MEDICATIONS  (PRN):  dextrose Oral Gel 15 Gram(s) Oral once PRN Blood Glucose LESS THAN 70 milliGRAM(s)/deciliter  melatonin 3 milliGRAM(s) Oral at bedtime PRN Sleep  sodium chloride 0.9% lock flush 10 milliLiter(s) IV Push every 1 hour PRN Pre/post blood products, medications, blood draw, and to maintain line patency      Allergies    No Known Allergies    Intolerances        REVIEW OF SYSTEMS:  ROS negative unless stated otherwise.    Vital Signs Last 24 Hrs  T(C): 36.9 (10 Eduin 2025 11:34), Max: 36.9 (10 Eduin 2025 11:34)  T(F): 98.4 (10 Eduin 2025 11:34), Max: 98.4 (10 Eduin 2025 11:34)  HR: 93 (10 Eduin 2025 11:34) (93 - 98)  BP: 133/86 (10 Eduin 2025 11:34) (125/85 - 159/98)  BP(mean): --  RR: 17 (10 Eduin 2025 11:34) (17 - 18)  SpO2: 100% (10 Eduin 2025 11:34) (98% - 100%)    Parameters below as of 10 Eduin 2025 11:34  Patient On (Oxygen Delivery Method): room air        PHYSICAL EXAM:  GENERAL: NAD  HEAD:  Atraumatic   EYES: EOMI   ENMT: Moist mucous membranes  NECK: Supple   NERVOUS SYSTEM:  Awake  CHEST/LUNG: CTAB   HEART: RRR   ABDOMEN: Soft, Nontender, Nondistended  EXTREMITIES:   No clubbing, cyanosis, or edema  PSYCH: Mood appropriate      LABS:                        6.8    8.72  )-----------( 291      ( 10 Eduin 2025 07:16 )             20.6     01-10    132[L]  |  100  |  74[H]  ----------------------------<  411[H]  3.8   |  24  |  7.31[H]    Ca    7.8[L]      10 Eduin 2025 07:16  Phos  4.5     01-10  Mg     1.7     01-10      PT/INR - ( 10 Eduin 2025 07:16 )   PT: 10.0 sec;   INR: 0.88 ratio         PTT - ( 10 Eduni 2025 07:16 )  PTT:26.0 sec  Urinalysis Basic - ( 10 Eduin 2025 07:16 )    Color: x / Appearance: x / SG: x / pH: x  Gluc: 411 mg/dL / Ketone: x  / Bili: x / Urobili: x   Blood: x / Protein: x / Nitrite: x   Leuk Esterase: x / RBC: x / WBC x   Sq Epi: x / Non Sq Epi: x / Bacteria: x      CAPILLARY BLOOD GLUCOSE      POCT Blood Glucose.: 145 mg/dL (10 Eduin 2025 13:37)  POCT Blood Glucose.: 313 mg/dL (10 Eduin 2025 10:45)  POCT Blood Glucose.: 405 mg/dL (10 Eduin 2025 09:23)  POCT Blood Glucose.: 440 mg/dL (10 Eduin 2025 07:59)  POCT Blood Glucose.: 444 mg/dL (10 Eduin 2025 07:57)  POCT Blood Glucose.: 340 mg/dL (09 Jan 2025 21:14)  POCT Blood Glucose.: 195 mg/dL (09 Jan 2025 17:48)      RADIOLOGY & ADDITIONAL TESTS:    Imaging Personally Reviewed:  [ X] YES  [ ] NO    Consultant(s) Notes Reviewed:  [ X] YES  [ ] NO    Care Discussed with Consultants/Other Providers [X ] YES  [ ] NO

## 2025-01-10 NOTE — DIETITIAN INITIAL EVALUATION ADULT - NS FNS DIET ORDER
Diet, NPO after Midnight:      NPO Start Date: 09-Jan-2025,   NPO Start Time: 23:59 (01-09-25 @ 18:17)

## 2025-01-10 NOTE — PROGRESS NOTE ADULT - ASSESSMENT
24F w/ PMHx T1DM and stage V CKD is scheduled for left arm AVF creation possible perm cath, possible right arm, possible graft today at 1:30PM with Dr. Rivero.

## 2025-01-10 NOTE — PROGRESS NOTE ADULT - ASSESSMENT
24F w/ PMHx T1DM, HTN, CKD stage V admitted with weakness.   Scheduled for left arm AVF creation this afternoon.   Blood glucoses into 400s, anesthesia will not proceed, hospitalist rec further work up prior.  No surgery today, discussed with patient plan for further work up and surgery at later date. Patient reports wanting to leave discussed risks of leaving against medical advice.    Plan as discussed with Dr. Rivero:  - No surgery today  - Continue HD per nephrology  - Continue DM management per primary team  - Recommend further medical work up and risk stratification  - Continue care per primary team

## 2025-01-10 NOTE — PROVIDER CONTACT NOTE (CRITICAL VALUE NOTIFICATION) - BACKGROUND
new dialysis patient
Admitted with abdominal pain , type 1 diabetic
Patient admitted for abdominal pain, nausea, vomiting, & diarrhea for 3 days w/ PMH hypertension,  CKD,  & diabetes type 1.

## 2025-01-10 NOTE — DIETITIAN INITIAL EVALUATION ADULT - OTHER INFO
Pt lives c a friend; has supportive aunt; independent c ADLs. Pt presented c decreased PO intake along c early signs of uremia; pt had been previously advised that dialysis was most likely to be started in near future; HD was initiated during this admission.  AVF creation was scheduled for today but anesthesiologist would not proceed c procedure due to elevated BG (in 400s); pt was advised by vascular that procedure needed to be re-scheduled; she wants to leave AMA; risks were explained to her; at this time not clear how she will proceed. Pt eating well; no wt loss reported.  Denies any N/V at this time (resolved).  Discussed general renal recommendations for individuals on HD; provided educational material along c RD contact information.

## 2025-01-10 NOTE — PROGRESS NOTE ADULT - SUBJECTIVE AND OBJECTIVE BOX
Patient seen and examined bedside with Dr. Rivero.  No complaints offered. Blood glucose elevated to 444 this am.  Patient was scheduled for AVF creation today but will not proceed today.  Denies nausea and vomiting, chest pain, dyspnea, cough.    T(F): 98 (01-10-25 @ 05:49), Max: 98 (01-09-25 @ 11:02)  HR: 98 (01-10-25 @ 05:49) (95 - 98)  BP: 159/98 (01-10-25 @ 05:49) (122/76 - 159/98)  RR: 18 (01-10-25 @ 05:49) (17 - 18)  SpO2: 98% (01-10-25 @ 05:49) (98% - 100%)  Wt(kg): --  CAPILLARY BLOOD GLUCOSE      POCT Blood Glucose.: 405 mg/dL (10 Eduin 2025 09:23)  POCT Blood Glucose.: 440 mg/dL (10 Eduin 2025 07:59)  POCT Blood Glucose.: 444 mg/dL (10 Eduin 2025 07:57)  POCT Blood Glucose.: 340 mg/dL (09 Jan 2025 21:14)  POCT Blood Glucose.: 195 mg/dL (09 Jan 2025 17:48)  POCT Blood Glucose.: 283 mg/dL (09 Jan 2025 11:27)      PHYSICAL EXAM:  General: NAD  Neuro:  Alert & oriented x 3  HEENT: NCAT, EOMI, conjunctiva clear  CV: RRR  Lung: respirations nonlabored, good inspiratory effort  Abdomen: soft, NTND.   Extremities: no pedal edema or calf tenderness noted, palpable radial/ulnar/brachial pulses b/l    LABS:                        6.8    8.72  )-----------( 291      ( 10 Eduin 2025 07:16 )             20.6     01-10    132[L]  |  100  |  74[H]  ----------------------------<  411[H]  3.8   |  24  |  7.31[H]    Ca    7.8[L]      10 Eduin 2025 07:16  Phos  4.5     01-10      PT/INR - ( 10 Eduin 2025 07:16 )   PT: 10.0 sec;   INR: 0.88 ratio         PTT - ( 10 Eduin 2025 07:16 )  PTT:26.0 sec

## 2025-01-10 NOTE — DIETITIAN INITIAL EVALUATION ADULT - DIET TYPE
consistent carbohydrate (no snacks)/renal replacement pts:no protein restr,no conc K & phos, low sodium/supplement (specify)

## 2025-01-11 LAB
ABO RH CONFIRMATION: SIGNIFICANT CHANGE UP
ALBUMIN SERPL ELPH-MCNC: 1.7 G/DL — LOW (ref 3.3–5)
ALP SERPL-CCNC: 109 U/L — SIGNIFICANT CHANGE UP (ref 40–120)
ALT FLD-CCNC: 13 U/L — SIGNIFICANT CHANGE UP (ref 12–78)
ANION GAP SERPL CALC-SCNC: 10 MMOL/L — SIGNIFICANT CHANGE UP (ref 5–17)
AST SERPL-CCNC: 12 U/L — LOW (ref 15–37)
BILIRUB SERPL-MCNC: 0.2 MG/DL — SIGNIFICANT CHANGE UP (ref 0.2–1.2)
BUN SERPL-MCNC: 84 MG/DL — HIGH (ref 7–23)
CALCIUM SERPL-MCNC: 8 MG/DL — LOW (ref 8.5–10.1)
CHLORIDE SERPL-SCNC: 105 MMOL/L — SIGNIFICANT CHANGE UP (ref 96–108)
CO2 SERPL-SCNC: 22 MMOL/L — SIGNIFICANT CHANGE UP (ref 22–31)
CREAT SERPL-MCNC: 8.38 MG/DL — HIGH (ref 0.5–1.3)
EGFR: 6 ML/MIN/1.73M2 — LOW
GLUCOSE BLDC GLUCOMTR-MCNC: 108 MG/DL — HIGH (ref 70–99)
GLUCOSE BLDC GLUCOMTR-MCNC: 218 MG/DL — HIGH (ref 70–99)
GLUCOSE BLDC GLUCOMTR-MCNC: 388 MG/DL — HIGH (ref 70–99)
GLUCOSE BLDC GLUCOMTR-MCNC: 77 MG/DL — SIGNIFICANT CHANGE UP (ref 70–99)
GLUCOSE SERPL-MCNC: 176 MG/DL — HIGH (ref 70–99)
HCT VFR BLD CALC: 21.2 % — LOW (ref 34.5–45)
HGB BLD-MCNC: 7 G/DL — CRITICAL LOW (ref 11.5–15.5)
MCHC RBC-ENTMCNC: 30.6 PG — SIGNIFICANT CHANGE UP (ref 27–34)
MCHC RBC-ENTMCNC: 33 G/DL — SIGNIFICANT CHANGE UP (ref 32–36)
MCV RBC AUTO: 92.6 FL — SIGNIFICANT CHANGE UP (ref 80–100)
NRBC # BLD: 0 /100 WBCS — SIGNIFICANT CHANGE UP (ref 0–0)
PLATELET # BLD AUTO: 330 K/UL — SIGNIFICANT CHANGE UP (ref 150–400)
POTASSIUM SERPL-MCNC: 3.5 MMOL/L — SIGNIFICANT CHANGE UP (ref 3.5–5.3)
POTASSIUM SERPL-SCNC: 3.5 MMOL/L — SIGNIFICANT CHANGE UP (ref 3.5–5.3)
PROT SERPL-MCNC: 5.5 GM/DL — LOW (ref 6–8.3)
RBC # BLD: 2.29 M/UL — LOW (ref 3.8–5.2)
RBC # FLD: 12.2 % — SIGNIFICANT CHANGE UP (ref 10.3–14.5)
SODIUM SERPL-SCNC: 137 MMOL/L — SIGNIFICANT CHANGE UP (ref 135–145)
WBC # BLD: 9.02 K/UL — SIGNIFICANT CHANGE UP (ref 3.8–10.5)
WBC # FLD AUTO: 9.02 K/UL — SIGNIFICANT CHANGE UP (ref 3.8–10.5)

## 2025-01-11 PROCEDURE — 99232 SBSQ HOSP IP/OBS MODERATE 35: CPT

## 2025-01-11 PROCEDURE — 99231 SBSQ HOSP IP/OBS SF/LOW 25: CPT

## 2025-01-11 RX ORDER — INSULIN LISPRO 100/ML
8 VIAL (ML) SUBCUTANEOUS
Refills: 0 | Status: DISCONTINUED | OUTPATIENT
Start: 2025-01-11 | End: 2025-01-12

## 2025-01-11 RX ORDER — INSULIN GLARGINE-YFGN 100 [IU]/ML
22 INJECTION, SOLUTION SUBCUTANEOUS AT BEDTIME
Refills: 0 | Status: DISCONTINUED | OUTPATIENT
Start: 2025-01-11 | End: 2025-01-12

## 2025-01-11 RX ORDER — IRON SUCROSE 20 MG/ML
100 INJECTION, SOLUTION INTRAVENOUS ONCE
Refills: 0 | Status: COMPLETED | OUTPATIENT
Start: 2025-01-11 | End: 2025-01-11

## 2025-01-11 RX ADMIN — CALCIUM ACETATE 667 MILLIGRAM(S): 667 CAPSULE ORAL at 13:44

## 2025-01-11 RX ADMIN — CHLORHEXIDINE GLUCONATE 1 APPLICATION(S): 1.2 RINSE ORAL at 05:54

## 2025-01-11 RX ADMIN — CALCIUM ACETATE 667 MILLIGRAM(S): 667 CAPSULE ORAL at 08:00

## 2025-01-11 RX ADMIN — Medication 25 MILLIGRAM(S): at 17:06

## 2025-01-11 RX ADMIN — IRON SUCROSE 100 MILLIGRAM(S): 20 INJECTION, SOLUTION INTRAVENOUS at 12:11

## 2025-01-11 RX ADMIN — INSULIN GLARGINE-YFGN 22 UNIT(S): 100 INJECTION, SOLUTION SUBCUTANEOUS at 22:09

## 2025-01-11 RX ADMIN — CALCITRIOL 0.25 MICROGRAM(S): 0.5 CAPSULE, LIQUID FILLED ORAL at 13:44

## 2025-01-11 RX ADMIN — PANTOPRAZOLE 40 MILLIGRAM(S): 40 TABLET, DELAYED RELEASE ORAL at 07:59

## 2025-01-11 RX ADMIN — EPOETIN ALFA 10000 UNIT(S): 2000 SOLUTION INTRAVENOUS; SUBCUTANEOUS at 12:11

## 2025-01-11 RX ADMIN — Medication 6 UNIT(S): at 07:59

## 2025-01-11 RX ADMIN — Medication 4: at 07:59

## 2025-01-11 RX ADMIN — Medication 6: at 22:09

## 2025-01-11 RX ADMIN — Medication 8 UNIT(S): at 13:47

## 2025-01-11 RX ADMIN — CALCIUM ACETATE 667 MILLIGRAM(S): 667 CAPSULE ORAL at 17:06

## 2025-01-11 RX ADMIN — Medication 25 MILLIGRAM(S): at 05:54

## 2025-01-11 NOTE — PROGRESS NOTE ADULT - ASSESSMENT
23 yo female PMH DM(Type I), CKD V,  HTN  presents with c/o 3 days of generalized abdominal pain, nausea, diarrhea. Patient also c/o midsternal CP and SOB.  Patient denies fevers, cough, dysuria. Patient reports she has been using normal insulin amount during illness.  on arrival to ER. Has CKD V at baseline, was told may   need to start HD sessions in near future.        Plan:  Admit to tele for nausea, decreased PO intake. Has CKD V at baseline, likely has early signs of uremia. No asterixis on exam.  Appreciate renal consult.     IVF stopped. Glucose today acceptable 195, increase Lantus 15, Admelog 5 units tid, A1C in AM, finger sticks every 4 hours for 24 hours.     A1C is 8.3, average control.     Beta-hydroxybuterate .50, mild elevation, anion gap normal. No DKA. Phos elevated 6.4, will start Phoslo tid.     Creatinine 10.0 on arrival, agree renal sonogram to confirm MRD. HGB acceptable at 10.4, CXR is clear.     BP well controlled now on Lopressor 25 mg bid.         Renal sono notes MRD, large protinuria noted.     Started on HD, vein mapping done, was supposed to go for AV fistula and permacath today however FS was >400 so procedure was cancelled. Hgb 7. Allowing 1 unit prbcs today with dialysis. Place for permacath Monday. HD tomorrow .     Outpatient center is set up as patient is new to HD.

## 2025-01-11 NOTE — CHART NOTE - NSCHARTNOTEFT_GEN_A_CORE
House- Medicine NP:    Called by RN to obtain consent for blood transfusion.   Patient is a 24y old  Female who presents with a chief complaint of Nausea, poor po intake, CKD V, Type I DM (10 Eduin 2025 21:55)                          7.0    9.02  )-----------( 330      ( 11 Jan 2025 08:50 )             21.2     Discussed with patient regarding the need for blood transfusion. Risk and benefits discussed. Risks including fever, chills/rigors, high or low blood pressure, respiratory distress (wheezing/hypoxia), urticaria/rash/edema, nausea, pain, bleeding, darkened urine, lower back pain, severe allergic reaction and death was discussed. Verbalizes the understanding and consent obtained. Witnessed by staff.

## 2025-01-11 NOTE — PROGRESS NOTE ADULT - SUBJECTIVE AND OBJECTIVE BOX
[facility] NEPHROLOGY SERVICES, [facility]  NEPHROLOGY AND HYPERTENSION  [address]  [address]  [place] [address]  [phone]    MD ANTONI JEFFERSON MD YELENA ROSENBERG, MD BINNY KOSHY, MD CHRISTOPHER CAPUTO, MD EDWARD BOVER, MD          Patient events noted    MEDICATIONS  (STANDING):  calcitriol   Capsule 0.25 MICROGram(s) Oral daily  calcium acetate 667 milliGRAM(s) Oral three times a day with meals  chlorhexidine 2% Cloths 1 Application(s) Topical <User Schedule>  dextrose 5%. 1000 milliLiter(s) (100 mL/Hr) IV Continuous <Continuous>  dextrose 5%. 1000 milliLiter(s) (50 mL/Hr) IV Continuous <Continuous>  dextrose 50% Injectable 25 Gram(s) IV Push once  dextrose 50% Injectable 12.5 Gram(s) IV Push once  dextrose 50% Injectable 25 Gram(s) IV Push once  epoetin elif-epbx (RETACRIT) Injectable 13761 Unit(s) IV Push <User Schedule>  glucagon  Injectable 1 milliGRAM(s) IntraMuscular once  influenza   Vaccine 0.5 milliLiter(s) IntraMuscular once  insulin glargine Injectable (LANTUS) 22 Unit(s) SubCutaneous at bedtime  insulin lispro (ADMELOG) corrective regimen sliding scale   SubCutaneous at bedtime  insulin lispro (ADMELOG) corrective regimen sliding scale   SubCutaneous three times a day before meals  insulin lispro Injectable (ADMELOG) 8 Unit(s) SubCutaneous three times a day before meals  metoprolol tartrate 25 milliGRAM(s) Oral two times a day  pantoprazole    Tablet 40 milliGRAM(s) Oral before breakfast    MEDICATIONS  (PRN):  dextrose Oral Gel 15 Gram(s) Oral once PRN Blood Glucose LESS THAN 70 milliGRAM(s)/deciliter  melatonin 3 milliGRAM(s) Oral at bedtime PRN Sleep  sodium chloride 0.9% lock flush 10 milliLiter(s) IV Push every 1 hour PRN Pre/post blood products, medications, blood draw, and to maintain line patency      01-11-25 @ 07:01  -  01-11-25 @ 23:15  --------------------------------------------------------  IN: 0 mL / OUT: 1500 mL / NET: -1500 mL      PHYSICAL EXAM:      T(C): 37 (01-11-25 @ 16:21), Max: 37.2 (01-11-25 @ 05:00)  HR: 105 (01-11-25 @ 17:03) (94 - 105)  BP: 114/76 (01-11-25 @ 17:03) (114/76 - 155/92)  RR: 18 (01-11-25 @ 16:21) (17 - 19)  SpO2: 100% (01-11-25 @ 16:21) (96% - 100%)  Wt(kg): --  Lungs clear  Heart S1S2  Abd soft NT ND  Extremities:   tr edema                                    7.0    9.02  )-----------( 330      ( 11 Jan 2025 08:50 )             21.2     01-11    137  |  105  |  84[H]  ----------------------------<  176[H]  3.5   |  22  |  8.38[H]    Ca    8.0[L]      11 Jan 2025 08:50  Phos  4.5     01-10  Mg     1.7     01-10    TPro  5.5[L]  /  Alb  1.7[L]  /  TBili  0.2  /  DBili  x   /  AST  12[L]  /  ALT  13  /  AlkPhos  109  01-11      LIVER FUNCTIONS - ( 11 Jan 2025 08:50 )  Alb: 1.7 g/dL / Pro: 5.5 gm/dL / ALK PHOS: 109 U/L / ALT: 13 U/L / AST: 12 U/L / GGT: x           Creatinine Trend: 8.38<--, 7.31<--, 5.65<--, 6.00<--, 10.80<--, 10.40<--    Assessment   New ESRD     Plan:  HD for today  PRBC tx  Perm cath Monday   Discharge planning Monday post perm cath   Retacrit support         Selvin Cosme MD

## 2025-01-11 NOTE — PROGRESS NOTE ADULT - SUBJECTIVE AND OBJECTIVE BOX
Patient is a 24y old  Female who presents with a chief complaint of Nausea, poor po intake, CKD V, Type I DM (10 Eduin 2025 21:55)      INTERVAL HPI/OVERNIGHT EVENTS: No events. Seen in HD.     MEDICATIONS  (STANDING):  calcitriol   Capsule 0.25 MICROGram(s) Oral daily  calcium acetate 667 milliGRAM(s) Oral three times a day with meals  chlorhexidine 2% Cloths 1 Application(s) Topical <User Schedule>  dextrose 5%. 1000 milliLiter(s) (100 mL/Hr) IV Continuous <Continuous>  dextrose 5%. 1000 milliLiter(s) (50 mL/Hr) IV Continuous <Continuous>  dextrose 50% Injectable 25 Gram(s) IV Push once  dextrose 50% Injectable 12.5 Gram(s) IV Push once  dextrose 50% Injectable 25 Gram(s) IV Push once  epoetin elif-epbx (RETACRIT) Injectable 70515 Unit(s) IV Push <User Schedule>  glucagon  Injectable 1 milliGRAM(s) IntraMuscular once  influenza   Vaccine 0.5 milliLiter(s) IntraMuscular once  insulin glargine Injectable (LANTUS) 22 Unit(s) SubCutaneous at bedtime  insulin lispro (ADMELOG) corrective regimen sliding scale   SubCutaneous at bedtime  insulin lispro (ADMELOG) corrective regimen sliding scale   SubCutaneous three times a day before meals  insulin lispro Injectable (ADMELOG) 8 Unit(s) SubCutaneous three times a day before meals  metoprolol tartrate 25 milliGRAM(s) Oral two times a day  pantoprazole    Tablet 40 milliGRAM(s) Oral before breakfast    MEDICATIONS  (PRN):  dextrose Oral Gel 15 Gram(s) Oral once PRN Blood Glucose LESS THAN 70 milliGRAM(s)/deciliter  melatonin 3 milliGRAM(s) Oral at bedtime PRN Sleep  sodium chloride 0.9% lock flush 10 milliLiter(s) IV Push every 1 hour PRN Pre/post blood products, medications, blood draw, and to maintain line patency      Allergies    No Known Allergies    Intolerances        REVIEW OF SYSTEMS:  ROS negative unless stated otherwise.    Vital Signs Last 24 Hrs  T(C): 36.9 (11 Jan 2025 13:38), Max: 37.2 (11 Jan 2025 05:00)  T(F): 98.4 (11 Jan 2025 13:38), Max: 99 (11 Jan 2025 05:00)  HR: 95 (11 Jan 2025 13:38) (66 - 98)  BP: 131/85 (11 Jan 2025 13:38) (127/85 - 155/92)  BP(mean): --  RR: 17 (11 Jan 2025 13:38) (17 - 19)  SpO2: 100% (11 Jan 2025 13:38) (96% - 100%)    Parameters below as of 11 Jan 2025 13:38  Patient On (Oxygen Delivery Method): room air        PHYSICAL EXAM:  GENERAL: NAD  HEAD:  Atraumatic   EYES: EOMI   ENMT: Moist mucous membranes  NECK: Supple   NERVOUS SYSTEM:  Awake  CHEST/LUNG: CTAB   HEART: RRR   ABDOMEN: Soft, Nontender, Nondistended  EXTREMITIES:   No clubbing, cyanosis, or edema  PSYCH: Mood appropriate    LABS:                        7.0    9.02  )-----------( 330      ( 11 Jan 2025 08:50 )             21.2     01-11    137  |  105  |  84[H]  ----------------------------<  176[H]  3.5   |  22  |  8.38[H]    Ca    8.0[L]      11 Jan 2025 08:50  Phos  4.5     01-10  Mg     1.7     01-10    TPro  5.5[L]  /  Alb  1.7[L]  /  TBili  0.2  /  DBili  x   /  AST  12[L]  /  ALT  13  /  AlkPhos  109  01-11    PT/INR - ( 10 Eduin 2025 07:16 )   PT: 10.0 sec;   INR: 0.88 ratio         PTT - ( 10 Eduin 2025 07:16 )  PTT:26.0 sec  Urinalysis Basic - ( 11 Jan 2025 08:50 )    Color: x / Appearance: x / SG: x / pH: x  Gluc: 176 mg/dL / Ketone: x  / Bili: x / Urobili: x   Blood: x / Protein: x / Nitrite: x   Leuk Esterase: x / RBC: x / WBC x   Sq Epi: x / Non Sq Epi: x / Bacteria: x      CAPILLARY BLOOD GLUCOSE      POCT Blood Glucose.: 108 mg/dL (11 Jan 2025 13:46)  POCT Blood Glucose.: 218 mg/dL (11 Jan 2025 07:42)  POCT Blood Glucose.: 238 mg/dL (10 Eduin 2025 21:34)  POCT Blood Glucose.: 149 mg/dL (10 Eduin 2025 16:26)      RADIOLOGY & ADDITIONAL TESTS:    Imaging Personally Reviewed:  [ X] YES  [ ] NO    Consultant(s) Notes Reviewed:  [ X] YES  [ ] NO    Care Discussed with Consultants/Other Providers [X ] YES  [ ] NO

## 2025-01-12 DIAGNOSIS — E10.65 TYPE 1 DIABETES MELLITUS WITH HYPERGLYCEMIA: ICD-10-CM

## 2025-01-12 LAB
ANION GAP SERPL CALC-SCNC: 7 MMOL/L — SIGNIFICANT CHANGE UP (ref 5–17)
BUN SERPL-MCNC: 49 MG/DL — HIGH (ref 7–23)
CALCIUM SERPL-MCNC: 7.6 MG/DL — LOW (ref 8.5–10.1)
CHLORIDE SERPL-SCNC: 107 MMOL/L — SIGNIFICANT CHANGE UP (ref 96–108)
CO2 SERPL-SCNC: 27 MMOL/L — SIGNIFICANT CHANGE UP (ref 22–31)
CREAT SERPL-MCNC: 5.79 MG/DL — HIGH (ref 0.5–1.3)
EGFR: 10 ML/MIN/1.73M2 — LOW
GLUCOSE BLDC GLUCOMTR-MCNC: 118 MG/DL — HIGH (ref 70–99)
GLUCOSE BLDC GLUCOMTR-MCNC: 157 MG/DL — HIGH (ref 70–99)
GLUCOSE BLDC GLUCOMTR-MCNC: 174 MG/DL — HIGH (ref 70–99)
GLUCOSE BLDC GLUCOMTR-MCNC: 333 MG/DL — HIGH (ref 70–99)
GLUCOSE BLDC GLUCOMTR-MCNC: 53 MG/DL — CRITICAL LOW (ref 70–99)
GLUCOSE BLDC GLUCOMTR-MCNC: 55 MG/DL — LOW (ref 70–99)
GLUCOSE BLDC GLUCOMTR-MCNC: 58 MG/DL — LOW (ref 70–99)
GLUCOSE BLDC GLUCOMTR-MCNC: 64 MG/DL — LOW (ref 70–99)
GLUCOSE SERPL-MCNC: 58 MG/DL — LOW (ref 70–99)
HCT VFR BLD CALC: 23.6 % — LOW (ref 34.5–45)
HGB BLD-MCNC: 7.6 G/DL — LOW (ref 11.5–15.5)
MCHC RBC-ENTMCNC: 28.6 PG — SIGNIFICANT CHANGE UP (ref 27–34)
MCHC RBC-ENTMCNC: 32.2 G/DL — SIGNIFICANT CHANGE UP (ref 32–36)
MCV RBC AUTO: 88.7 FL — SIGNIFICANT CHANGE UP (ref 80–100)
NRBC # BLD: 0 /100 WBCS — SIGNIFICANT CHANGE UP (ref 0–0)
PLATELET # BLD AUTO: 266 K/UL — SIGNIFICANT CHANGE UP (ref 150–400)
POTASSIUM SERPL-MCNC: 3.5 MMOL/L — SIGNIFICANT CHANGE UP (ref 3.5–5.3)
POTASSIUM SERPL-SCNC: 3.5 MMOL/L — SIGNIFICANT CHANGE UP (ref 3.5–5.3)
RBC # BLD: 2.66 M/UL — LOW (ref 3.8–5.2)
RBC # FLD: 16.6 % — HIGH (ref 10.3–14.5)
SODIUM SERPL-SCNC: 141 MMOL/L — SIGNIFICANT CHANGE UP (ref 135–145)
WBC # BLD: 6.98 K/UL — SIGNIFICANT CHANGE UP (ref 3.8–10.5)
WBC # FLD AUTO: 6.98 K/UL — SIGNIFICANT CHANGE UP (ref 3.8–10.5)

## 2025-01-12 PROCEDURE — 99232 SBSQ HOSP IP/OBS MODERATE 35: CPT

## 2025-01-12 RX ORDER — INSULIN LISPRO 100/ML
5 VIAL (ML) SUBCUTANEOUS
Refills: 0 | Status: DISCONTINUED | OUTPATIENT
Start: 2025-01-12 | End: 2025-01-13

## 2025-01-12 RX ORDER — INSULIN GLARGINE-YFGN 100 [IU]/ML
14 INJECTION, SOLUTION SUBCUTANEOUS AT BEDTIME
Refills: 0 | Status: DISCONTINUED | OUTPATIENT
Start: 2025-01-12 | End: 2025-01-13

## 2025-01-12 RX ORDER — INSULIN GLARGINE-YFGN 100 [IU]/ML
18 INJECTION, SOLUTION SUBCUTANEOUS AT BEDTIME
Refills: 0 | Status: DISCONTINUED | OUTPATIENT
Start: 2025-01-12 | End: 2025-01-12

## 2025-01-12 RX ADMIN — Medication 8: at 11:55

## 2025-01-12 RX ADMIN — CALCIUM ACETATE 667 MILLIGRAM(S): 667 CAPSULE ORAL at 13:02

## 2025-01-12 RX ADMIN — Medication 5 UNIT(S): at 11:56

## 2025-01-12 RX ADMIN — CALCIUM ACETATE 667 MILLIGRAM(S): 667 CAPSULE ORAL at 17:19

## 2025-01-12 RX ADMIN — PANTOPRAZOLE 40 MILLIGRAM(S): 40 TABLET, DELAYED RELEASE ORAL at 05:20

## 2025-01-12 RX ADMIN — Medication 5 UNIT(S): at 17:19

## 2025-01-12 RX ADMIN — Medication 25 MILLIGRAM(S): at 05:21

## 2025-01-12 RX ADMIN — CHLORHEXIDINE GLUCONATE 1 APPLICATION(S): 1.2 RINSE ORAL at 05:22

## 2025-01-12 RX ADMIN — Medication 25 MILLIGRAM(S): at 17:19

## 2025-01-12 RX ADMIN — CALCITRIOL 0.25 MICROGRAM(S): 0.5 CAPSULE, LIQUID FILLED ORAL at 11:56

## 2025-01-12 RX ADMIN — INSULIN GLARGINE-YFGN 14 UNIT(S): 100 INJECTION, SOLUTION SUBCUTANEOUS at 21:57

## 2025-01-12 RX ADMIN — CALCIUM ACETATE 667 MILLIGRAM(S): 667 CAPSULE ORAL at 09:57

## 2025-01-12 NOTE — CONSULT NOTE ADULT - PROBLEM SELECTOR RECOMMENDATION 9
HbA1c is 8.3 which points towards fair control but could be better.  Patient has decreased renal gluconeogenesis and hence in her situation usually hemoglobin A1c falls to <7.0.  While inpatient large doses of insulin should not be given.  Should continue with current regimen.  Patient will be n.p.o. for the procedure in a.m. hence very low-dose D5W should be given and half the dose of Lantus which is approximately 10 units can be given in a.m.  Patient should going to the OR with D5W at rate of 30 cc an hour with Lantus approximately 10 units given in the morning.  Postprocedure once p.o. feeds  resume, she should be put back on the current regimen  Thank you for the courtesy of this consultation

## 2025-01-12 NOTE — PROGRESS NOTE ADULT - ASSESSMENT
23 yo female PMH DM(Type I), CKD V,  HTN  presents with c/o 3 days of generalized abdominal pain, nausea, diarrhea. Patient also c/o midsternal CP and SOB.  Patient denies fevers, cough, dysuria. Patient reports she has been using normal insulin amount during illness.  on arrival to ER. Has CKD V at baseline, was told may   need to start HD sessions in near future.        Plan:  Admit to tele for nausea, decreased PO intake. Has CKD V at baseline, likely has early signs of uremia. No asterixis on exam.  Appreciate renal consult.     IVF stopped. Glucose today acceptable 195, increase Lantus 15, Admelog 5 units tid, A1C in AM, finger sticks every 4 hours for 24 hours.     A1C is 8.3, average control.     Beta-hydroxybuterate .50, mild elevation, anion gap normal. No DKA. Phos elevated 6.4, will start Phoslo tid.     Creatinine 10.0 on arrival, agree renal sonogram to confirm MRD. HGB acceptable at 10.4, CXR is clear.     BP well controlled now on Lopressor 25 mg bid.         Renal sono notes MRD, large protinuria noted.     Started on HD, vein mapping done, was supposed to go for AV fistula and permacath today however FS was >400 so procedure was cancelled. Hgb 7. Allowing 1 unit prbcs today with dialysis. Place for permacath Monday. HD tomorrow .     Endo consulted to help with sugars.  No cardiac history. No history of MI, CVA. EKG reviewed. No indication for cardiac clearance to proceed with procedure.   RCRI score 1 indicating 6% risk of major cardiac arrest.   No absolute contraindication to planned procedure.        Outpatient center is set up as patient is new to HD.

## 2025-01-12 NOTE — PHARMACOTHERAPY INTERVENTION NOTE - COMMENTS
BG in 50-60s; recommended to adjust lantus. patient is on 22 units and was on 18 units prior to the change. PA will review and adjust the lantus dose
Recommended insulin glargine dose adjustment to 18 units (~50% of yesterday's sliding scale use) given hyperglycemia.
Spoke to the patient on blood pressure, DM medications. Went over s/e, frequency, missed dose, importance of checking and monitoring blood glucose as well as blood pressure. 
Recommended insulin lispro dose adjustment given hyperglycemia.

## 2025-01-12 NOTE — PROGRESS NOTE ADULT - SUBJECTIVE AND OBJECTIVE BOX
Patient is a 24y old  Female who presents with a chief complaint of Nausea, poor po intake, CKD V, Type I DM (11 Jan 2025 23:15)      INTERVAL HPI/OVERNIGHT EVENTS: Overnight no acute events.     MEDICATIONS  (STANDING):  calcitriol   Capsule 0.25 MICROGram(s) Oral daily  calcium acetate 667 milliGRAM(s) Oral three times a day with meals  chlorhexidine 2% Cloths 1 Application(s) Topical <User Schedule>  dextrose 5%. 1000 milliLiter(s) (100 mL/Hr) IV Continuous <Continuous>  dextrose 5%. 1000 milliLiter(s) (50 mL/Hr) IV Continuous <Continuous>  dextrose 50% Injectable 25 Gram(s) IV Push once  dextrose 50% Injectable 12.5 Gram(s) IV Push once  dextrose 50% Injectable 25 Gram(s) IV Push once  epoetin elif-epbx (RETACRIT) Injectable 84030 Unit(s) IV Push <User Schedule>  glucagon  Injectable 1 milliGRAM(s) IntraMuscular once  influenza   Vaccine 0.5 milliLiter(s) IntraMuscular once  insulin glargine Injectable (LANTUS) 14 Unit(s) SubCutaneous at bedtime  insulin lispro (ADMELOG) corrective regimen sliding scale   SubCutaneous at bedtime  insulin lispro (ADMELOG) corrective regimen sliding scale   SubCutaneous three times a day before meals  insulin lispro Injectable (ADMELOG) 5 Unit(s) SubCutaneous three times a day before meals  metoprolol tartrate 25 milliGRAM(s) Oral two times a day  pantoprazole    Tablet 40 milliGRAM(s) Oral before breakfast    MEDICATIONS  (PRN):  dextrose Oral Gel 15 Gram(s) Oral once PRN Blood Glucose LESS THAN 70 milliGRAM(s)/deciliter  melatonin 3 milliGRAM(s) Oral at bedtime PRN Sleep  sodium chloride 0.9% lock flush 10 milliLiter(s) IV Push every 1 hour PRN Pre/post blood products, medications, blood draw, and to maintain line patency      Allergies    No Known Allergies    Intolerances        REVIEW OF SYSTEMS:  ROS negative unless stated otherwise.    Vital Signs Last 24 Hrs  T(C): 37.1 (12 Jan 2025 11:14), Max: 37.1 (12 Jan 2025 11:14)  T(F): 98.7 (12 Jan 2025 11:14), Max: 98.7 (12 Jan 2025 11:14)  HR: 96 (12 Jan 2025 11:14) (89 - 105)  BP: 138/84 (12 Jan 2025 11:14) (114/76 - 157/100)  BP(mean): --  RR: 17 (12 Jan 2025 11:14) (17 - 20)  SpO2: 100% (12 Jan 2025 11:14) (99% - 100%)    Parameters below as of 12 Jan 2025 11:14  Patient On (Oxygen Delivery Method): room air        PHYSICAL EXAM:  GENERAL: NAD  HEAD:  Atraumatic   EYES: EOMI   ENMT: Moist mucous membranes  NECK: Supple   NERVOUS SYSTEM:  Awake  CHEST/LUNG: CTAB   HEART: RRR   ABDOMEN: Soft, Nontender, Nondistended  EXTREMITIES:   No clubbing, cyanosis, or edema  PSYCH: Mood appropriate    LABS:                        7.6    6.98  )-----------( 266      ( 12 Jan 2025 07:35 )             23.6     01-12    141  |  107  |  49[H]  ----------------------------<  58[L]  3.5   |  27  |  5.79[H]    Ca    7.6[L]      12 Jan 2025 07:35    TPro  5.5[L]  /  Alb  1.7[L]  /  TBili  0.2  /  DBili  x   /  AST  12[L]  /  ALT  13  /  AlkPhos  109  01-11      Urinalysis Basic - ( 12 Jan 2025 07:35 )    Color: x / Appearance: x / SG: x / pH: x  Gluc: 58 mg/dL / Ketone: x  / Bili: x / Urobili: x   Blood: x / Protein: x / Nitrite: x   Leuk Esterase: x / RBC: x / WBC x   Sq Epi: x / Non Sq Epi: x / Bacteria: x      CAPILLARY BLOOD GLUCOSE      POCT Blood Glucose.: 333 mg/dL (12 Jan 2025 11:27)  POCT Blood Glucose.: 157 mg/dL (12 Jan 2025 09:38)  POCT Blood Glucose.: 64 mg/dL (12 Jan 2025 08:39)  POCT Blood Glucose.: 58 mg/dL (12 Jan 2025 08:38)  POCT Blood Glucose.: 53 mg/dL (12 Jan 2025 08:08)  POCT Blood Glucose.: 55 mg/dL (12 Jan 2025 08:07)  POCT Blood Glucose.: 388 mg/dL (11 Jan 2025 22:06)  POCT Blood Glucose.: 77 mg/dL (11 Jan 2025 16:11)  POCT Blood Glucose.: 108 mg/dL (11 Jan 2025 13:46)      RADIOLOGY & ADDITIONAL TESTS:    Imaging Personally Reviewed:  [ X] YES  [ ] NO    Consultant(s) Notes Reviewed:  [ X] YES  [ ] NO    Care Discussed with Consultants/Other Providers [X ] YES  [ ] NO

## 2025-01-12 NOTE — CONSULT NOTE ADULT - SUBJECTIVE AND OBJECTIVE BOX
Patient is a 24y old  Female who presents with a chief complaint of Nausea, poor po intake, CKD V, Type I DM (12 Jan 2025 13:18)      Reason For Consult: uncontrolled blood glucose     HPI:  25 yo female PMH DM(Type I), CKD V,  HTN  presents with c/o 3 days of generalized abdominal pain, nausea, diarrhea. Patient also c/o midsternal CP and SOB.  Patient denies fevers, cough, dysuria. Patient reports she has been using normal insulin amount during illness.  on arrival to ER. Has CKD V at baseline, was told may   need to start HD sessions in near future.   (04 Jan 2025 14:47)  Patient's blood glucose was fluctuating earlier.  After a bit coverage it went down to low requiring coverage to be held back.  Currently on 14 units of Lantus and 5 units of prandial lispro.  Awaiting fistula placement for hemodialysis..  Last 2 fingersticks are in the 100 send stable on this regimen    PAST MEDICAL & SURGICAL HISTORY:  DM (diabetes mellitus), type 1      Chronic kidney disease, unspecified CKD stage      HTN (hypertension)          FAMILY HISTORY:        Social History:    MEDICATIONS  (STANDING):  calcitriol   Capsule 0.25 MICROGram(s) Oral daily  calcium acetate 667 milliGRAM(s) Oral three times a day with meals  chlorhexidine 2% Cloths 1 Application(s) Topical <User Schedule>  dextrose 5%. 1000 milliLiter(s) (100 mL/Hr) IV Continuous <Continuous>  dextrose 5%. 1000 milliLiter(s) (50 mL/Hr) IV Continuous <Continuous>  dextrose 50% Injectable 25 Gram(s) IV Push once  dextrose 50% Injectable 12.5 Gram(s) IV Push once  dextrose 50% Injectable 25 Gram(s) IV Push once  epoetin elif-epbx (RETACRIT) Injectable 99233 Unit(s) IV Push <User Schedule>  glucagon  Injectable 1 milliGRAM(s) IntraMuscular once  influenza   Vaccine 0.5 milliLiter(s) IntraMuscular once  insulin glargine Injectable (LANTUS) 14 Unit(s) SubCutaneous at bedtime  insulin lispro (ADMELOG) corrective regimen sliding scale   SubCutaneous at bedtime  insulin lispro (ADMELOG) corrective regimen sliding scale   SubCutaneous three times a day before meals  insulin lispro Injectable (ADMELOG) 5 Unit(s) SubCutaneous three times a day before meals  metoprolol tartrate 25 milliGRAM(s) Oral two times a day  pantoprazole    Tablet 40 milliGRAM(s) Oral before breakfast    MEDICATIONS  (PRN):  dextrose Oral Gel 15 Gram(s) Oral once PRN Blood Glucose LESS THAN 70 milliGRAM(s)/deciliter  melatonin 3 milliGRAM(s) Oral at bedtime PRN Sleep  sodium chloride 0.9% lock flush 10 milliLiter(s) IV Push every 1 hour PRN Pre/post blood products, medications, blood draw, and to maintain line patency      REVIEW OF SYSTEMS:       T(C): 36.9 (01-12-25 @ 16:27), Max: 37.1 (01-12-25 @ 11:14)  HR: 95 (01-12-25 @ 16:27) (89 - 96)  BP: 137/90 (01-12-25 @ 16:27) (137/90 - 157/100)  RR: 18 (01-12-25 @ 16:27) (17 - 20)  SpO2: 100% (01-12-25 @ 16:27) (99% - 100%)  Wt(kg): --    PHYSICAL EXAM:  GENERAL: NAD, well-groomed, well-developed  HEAD:  Atraumatic, Normocephalic  EYES: PERRLA, conjunctiva and sclera clear  ENMT: No  exudates,, Moist mucous membranes,, No lesions      CAPILLARY BLOOD GLUCOSE      POCT Blood Glucose.: 174 mg/dL (12 Jan 2025 21:32)  POCT Blood Glucose.: 118 mg/dL (12 Jan 2025 16:36)  POCT Blood Glucose.: 333 mg/dL (12 Jan 2025 11:27)  POCT Blood Glucose.: 157 mg/dL (12 Jan 2025 09:38)  POCT Blood Glucose.: 64 mg/dL (12 Jan 2025 08:39)  POCT Blood Glucose.: 58 mg/dL (12 Jan 2025 08:38)  POCT Blood Glucose.: 53 mg/dL (12 Jan 2025 08:08)  POCT Blood Glucose.: 55 mg/dL (12 Jan 2025 08:07)                            7.6    6.98  )-----------( 266      ( 12 Jan 2025 07:35 )             23.6       CMP:  01-12 @ 07:35  SGPT --  Albumin --   Alk Phos --   Anion Gap 7   SGOT --   Total Bili --   BUN 49   Calcium Total 7.6   CO2 27   Chloride 107   Creatinine 5.79   eGFR if AA --   eGFR if non AA --   Glucose 58   Potassium 3.5   Protein --   Sodium 141      Thyroid Function Tests:      Diabetes Tests:     Parathyroids:     Adrenals:       Radiology:

## 2025-01-12 NOTE — CONSULT NOTE ADULT - REASON FOR ADMISSION
Nausea, poor po intake, CKD V, Type I DM

## 2025-01-12 NOTE — PROGRESS NOTE ADULT - SUBJECTIVE AND OBJECTIVE BOX
[facility] NEPHROLOGY SERVICES, [facility]  NEPHROLOGY AND HYPERTENSION  [address]  [address]  [place] [address]  [phone]    MD ANTONI JEFFERSON MD YELENA ROSENBERG, MD BINNY KOSHY, MD CHRISTOPHER CAPUTO, MD EDWARD BOVER, MD          Patient events noted  No distress    MEDICATIONS  (STANDING):  calcitriol   Capsule 0.25 MICROGram(s) Oral daily  calcium acetate 667 milliGRAM(s) Oral three times a day with meals  chlorhexidine 2% Cloths 1 Application(s) Topical <User Schedule>  dextrose 5%. 1000 milliLiter(s) (100 mL/Hr) IV Continuous <Continuous>  dextrose 5%. 1000 milliLiter(s) (50 mL/Hr) IV Continuous <Continuous>  dextrose 50% Injectable 25 Gram(s) IV Push once  dextrose 50% Injectable 12.5 Gram(s) IV Push once  dextrose 50% Injectable 25 Gram(s) IV Push once  epoetin elif-epbx (RETACRIT) Injectable 89333 Unit(s) IV Push <User Schedule>  glucagon  Injectable 1 milliGRAM(s) IntraMuscular once  influenza   Vaccine 0.5 milliLiter(s) IntraMuscular once  insulin glargine Injectable (LANTUS) 14 Unit(s) SubCutaneous at bedtime  insulin lispro (ADMELOG) corrective regimen sliding scale   SubCutaneous at bedtime  insulin lispro (ADMELOG) corrective regimen sliding scale   SubCutaneous three times a day before meals  insulin lispro Injectable (ADMELOG) 5 Unit(s) SubCutaneous three times a day before meals  metoprolol tartrate 25 milliGRAM(s) Oral two times a day  pantoprazole    Tablet 40 milliGRAM(s) Oral before breakfast    MEDICATIONS  (PRN):  dextrose Oral Gel 15 Gram(s) Oral once PRN Blood Glucose LESS THAN 70 milliGRAM(s)/deciliter  melatonin 3 milliGRAM(s) Oral at bedtime PRN Sleep  sodium chloride 0.9% lock flush 10 milliLiter(s) IV Push every 1 hour PRN Pre/post blood products, medications, blood draw, and to maintain line patency      01-11-25 @ 07:01  -  01-12-25 @ 07:00  --------------------------------------------------------  IN: 0 mL / OUT: 1500 mL / NET: -1500 mL      PHYSICAL EXAM:      T(C): 36.9 (01-13-25 @ 00:02), Max: 37.1 (01-12-25 @ 11:14)  HR: 100 (01-13-25 @ 00:02) (89 - 100)  BP: 145/98 (01-13-25 @ 00:02) (137/90 - 145/98)  RR: 19 (01-13-25 @ 00:02) (17 - 20)  SpO2: 100% (01-13-25 @ 00:02) (99% - 100%)  Wt(kg): --  Lungs clear  Heart S1S2  Abd soft NT ND  Extremities:   tr edema                                    7.6    6.98  )-----------( 266      ( 12 Jan 2025 07:35 )             23.6     01-12    141  |  107  |  49[H]  ----------------------------<  58[L]  3.5   |  27  |  5.79[H]    Ca    7.6[L]      12 Jan 2025 07:35    TPro  5.5[L]  /  Alb  1.7[L]  /  TBili  0.2  /  DBili  x   /  AST  12[L]  /  ALT  13  /  AlkPhos  109  01-11      LIVER FUNCTIONS - ( 11 Jan 2025 08:50 )  Alb: 1.7 g/dL / Pro: 5.5 gm/dL / ALK PHOS: 109 U/L / ALT: 13 U/L / AST: 12 U/L / GGT: x           Creatinine Trend: 5.79<--, 8.38<--, 7.31<--, 5.65<--, 6.00<--, 10.80<--      Assessment   New ESRD  Anemia, retacrit support    Plan:  Perm cath tomorrow then d/c    Selvin Cosme MD

## 2025-01-13 ENCOUNTER — TRANSCRIPTION ENCOUNTER (OUTPATIENT)
Age: 25
End: 2025-01-13

## 2025-01-13 VITALS
TEMPERATURE: 98 F | DIASTOLIC BLOOD PRESSURE: 104 MMHG | RESPIRATION RATE: 16 BRPM | OXYGEN SATURATION: 100 % | HEART RATE: 86 BPM | SYSTOLIC BLOOD PRESSURE: 164 MMHG

## 2025-01-13 LAB
ANION GAP SERPL CALC-SCNC: 10 MMOL/L — SIGNIFICANT CHANGE UP (ref 5–17)
BLD GP AB SCN SERPL QL: SIGNIFICANT CHANGE UP
BUN SERPL-MCNC: 67 MG/DL — HIGH (ref 7–23)
CALCIUM SERPL-MCNC: 8.3 MG/DL — LOW (ref 8.5–10.1)
CHLORIDE SERPL-SCNC: 104 MMOL/L — SIGNIFICANT CHANGE UP (ref 96–108)
CO2 SERPL-SCNC: 24 MMOL/L — SIGNIFICANT CHANGE UP (ref 22–31)
CREAT SERPL-MCNC: 7.41 MG/DL — HIGH (ref 0.5–1.3)
EGFR: 7 ML/MIN/1.73M2 — LOW
GLUCOSE BLDC GLUCOMTR-MCNC: 298 MG/DL — HIGH (ref 70–99)
GLUCOSE BLDC GLUCOMTR-MCNC: 416 MG/DL — HIGH (ref 70–99)
GLUCOSE BLDC GLUCOMTR-MCNC: 467 MG/DL — CRITICAL HIGH (ref 70–99)
GLUCOSE BLDC GLUCOMTR-MCNC: 60 MG/DL — LOW (ref 70–99)
GLUCOSE BLDC GLUCOMTR-MCNC: 67 MG/DL — LOW (ref 70–99)
GLUCOSE BLDC GLUCOMTR-MCNC: 68 MG/DL — LOW (ref 70–99)
GLUCOSE BLDC GLUCOMTR-MCNC: 89 MG/DL — SIGNIFICANT CHANGE UP (ref 70–99)
GLUCOSE BLDC GLUCOMTR-MCNC: 93 MG/DL — SIGNIFICANT CHANGE UP (ref 70–99)
GLUCOSE BLDC GLUCOMTR-MCNC: 96 MG/DL — SIGNIFICANT CHANGE UP (ref 70–99)
GLUCOSE BLDC GLUCOMTR-MCNC: 97 MG/DL — SIGNIFICANT CHANGE UP (ref 70–99)
GLUCOSE SERPL-MCNC: 63 MG/DL — LOW (ref 70–99)
HCT VFR BLD CALC: 26.2 % — LOW (ref 34.5–45)
HGB BLD-MCNC: 8.6 G/DL — LOW (ref 11.5–15.5)
INR BLD: 0.87 RATIO — SIGNIFICANT CHANGE UP (ref 0.85–1.16)
MCHC RBC-ENTMCNC: 29.6 PG — SIGNIFICANT CHANGE UP (ref 27–34)
MCHC RBC-ENTMCNC: 32.8 G/DL — SIGNIFICANT CHANGE UP (ref 32–36)
MCV RBC AUTO: 90 FL — SIGNIFICANT CHANGE UP (ref 80–100)
NRBC # BLD: 0 /100 WBCS — SIGNIFICANT CHANGE UP (ref 0–0)
PLATELET # BLD AUTO: 352 K/UL — SIGNIFICANT CHANGE UP (ref 150–400)
POTASSIUM SERPL-MCNC: 3.3 MMOL/L — LOW (ref 3.5–5.3)
POTASSIUM SERPL-SCNC: 3.3 MMOL/L — LOW (ref 3.5–5.3)
PROTHROM AB SERPL-ACNC: 10.1 SEC — SIGNIFICANT CHANGE UP (ref 9.9–13.4)
RBC # BLD: 2.91 M/UL — LOW (ref 3.8–5.2)
RBC # FLD: 15.8 % — HIGH (ref 10.3–14.5)
SODIUM SERPL-SCNC: 138 MMOL/L — SIGNIFICANT CHANGE UP (ref 135–145)
WBC # BLD: 7.83 K/UL — SIGNIFICANT CHANGE UP (ref 3.8–10.5)
WBC # FLD AUTO: 7.83 K/UL — SIGNIFICANT CHANGE UP (ref 3.8–10.5)

## 2025-01-13 PROCEDURE — 77001 FLUOROGUIDE FOR VEIN DEVICE: CPT | Mod: 26

## 2025-01-13 PROCEDURE — 99239 HOSP IP/OBS DSCHRG MGMT >30: CPT

## 2025-01-13 PROCEDURE — 36558 INSERT TUNNELED CV CATH: CPT | Mod: RT

## 2025-01-13 RX ORDER — ISOPROPYL ALCOHOL, BENZOCAINE .7; .06 ML/ML; ML/ML
0 SWAB TOPICAL
Qty: 100 | Refills: 1
Start: 2025-01-13

## 2025-01-13 RX ORDER — INSULIN LISPRO 100/ML
3 VIAL (ML) SUBCUTANEOUS ONCE
Refills: 0 | Status: COMPLETED | OUTPATIENT
Start: 2025-01-13 | End: 2025-01-13

## 2025-01-13 RX ORDER — METOPROLOL TARTRATE 50 MG
1 TABLET ORAL
Qty: 60 | Refills: 0
Start: 2025-01-13 | End: 2025-02-11

## 2025-01-13 RX ORDER — INSULIN GLARGINE-YFGN 100 [IU]/ML
10 INJECTION, SOLUTION SUBCUTANEOUS
Qty: 1 | Refills: 0
Start: 2025-01-13 | End: 2025-02-11

## 2025-01-13 RX ORDER — INSULIN LISPRO 100/ML
4 VIAL (ML) SUBCUTANEOUS
Qty: 1 | Refills: 0
Start: 2025-01-13 | End: 2025-02-11

## 2025-01-13 RX ORDER — CALCIUM ACETATE 667 MG/1
1 CAPSULE ORAL
Qty: 30 | Refills: 0
Start: 2025-01-13 | End: 2025-02-11

## 2025-01-13 RX ORDER — CHLORHEXIDINE GLUCONATE 1.2 MG/ML
1 RINSE ORAL
Refills: 0 | Status: DISCONTINUED | OUTPATIENT
Start: 2025-01-13 | End: 2025-01-13

## 2025-01-13 RX ORDER — HYDRALAZINE HYDROCHLORIDE 10 MG/1
10 TABLET ORAL EVERY 6 HOURS
Refills: 0 | Status: DISCONTINUED | OUTPATIENT
Start: 2025-01-13 | End: 2025-01-13

## 2025-01-13 RX ORDER — CALCITRIOL 0.5 UG/1
1 CAPSULE, LIQUID FILLED ORAL
Qty: 30 | Refills: 0
Start: 2025-01-13 | End: 2025-02-11

## 2025-01-13 RX ORDER — DEXTROSE MONOHYDRATE 25 G/50ML
12.5 INJECTION, SOLUTION INTRAVENOUS ONCE
Refills: 0 | Status: COMPLETED | OUTPATIENT
Start: 2025-01-13 | End: 2025-01-13

## 2025-01-13 RX ORDER — SODIUM CHLORIDE 9 MG/ML
1000 INJECTION, SOLUTION INTRAVENOUS
Refills: 0 | Status: DISCONTINUED | OUTPATIENT
Start: 2025-01-13 | End: 2025-01-13

## 2025-01-13 RX ORDER — PANTOPRAZOLE 40 MG/1
1 TABLET, DELAYED RELEASE ORAL
Qty: 0 | Refills: 0 | DISCHARGE
Start: 2025-01-13

## 2025-01-13 RX ADMIN — CHLORHEXIDINE GLUCONATE 1 APPLICATION(S): 1.2 RINSE ORAL at 05:37

## 2025-01-13 RX ADMIN — PANTOPRAZOLE 40 MILLIGRAM(S): 40 TABLET, DELAYED RELEASE ORAL at 05:36

## 2025-01-13 RX ADMIN — CALCIUM ACETATE 667 MILLIGRAM(S): 667 CAPSULE ORAL at 09:16

## 2025-01-13 RX ADMIN — Medication 3 UNIT(S): at 00:57

## 2025-01-13 RX ADMIN — DEXTROSE MONOHYDRATE 12.5 GRAM(S): 25 INJECTION, SOLUTION INTRAVENOUS at 08:51

## 2025-01-13 RX ADMIN — CALCIUM ACETATE 667 MILLIGRAM(S): 667 CAPSULE ORAL at 13:29

## 2025-01-13 RX ADMIN — Medication 3 UNIT(S): at 05:50

## 2025-01-13 RX ADMIN — CHLORHEXIDINE GLUCONATE 1 APPLICATION(S): 1.2 RINSE ORAL at 13:33

## 2025-01-13 RX ADMIN — HYDRALAZINE HYDROCHLORIDE 10 MILLIGRAM(S): 10 TABLET ORAL at 13:32

## 2025-01-13 RX ADMIN — SODIUM CHLORIDE 30 MILLILITER(S): 9 INJECTION, SOLUTION INTRAVENOUS at 08:52

## 2025-01-13 RX ADMIN — CALCITRIOL 0.25 MICROGRAM(S): 0.5 CAPSULE, LIQUID FILLED ORAL at 13:29

## 2025-01-13 RX ADMIN — Medication 25 MILLIGRAM(S): at 05:36

## 2025-01-13 NOTE — PROGRESS NOTE ADULT - SUBJECTIVE AND OBJECTIVE BOX
Patient seen and examined at bedside with no complaints.   NPO.   Denies pain, nausea/ vomiting, chills, SOB, chest pain, calf tenderness.          Vital Signs Last 24 Hrs  T(F): 98.2 (01-13-25 @ 13:00), Max: 98.4 (01-12-25 @ 16:27)  HR: 86 (01-13-25 @ 13:00)  BP: 164/104 (01-13-25 @ 13:00)  RR: 16 (01-13-25 @ 13:00)  SpO2: 100% (01-13-25 @ 13:00)  Wt(kg): --   CAPILLARY BLOOD GLUCOSE      POCT Blood Glucose.: 93 mg/dL (13 Jan 2025 11:54)      GENERAL: Alert, NAD  CHEST/LUNG: Respirations non labored, good inspiratory effort; permacath in place  HEART: S1S2  HEENT: NCAT, EOMI, conjunctiva clear   ABDOMEN: Nondistended, + bowel sounds, nontender  EXTREMITIES:  No calf tenderness, no edema    I&O's Detail      LABS:                        8.6    7.83  )-----------( 352      ( 13 Jan 2025 08:15 )             26.2     01-13    138  |  104  |  67[H]  ----------------------------<  63[L]  3.3[L]   |  24  |  7.41[H]    Ca    8.3[L]      13 Jan 2025 08:15      PT/INR - ( 13 Jan 2025 08:15 )   PT: 10.1 sec;   INR: 0.87 ratio

## 2025-01-13 NOTE — PROCEDURE NOTE - PROCEDURE FINDINGS AND DETAILS
Right nontunneled HD catheter converted to tunneled HD catheter. Catheter tip in SVC. 14.5F x 19cm tip to cuff.

## 2025-01-13 NOTE — PROGRESS NOTE ADULT - ASSESSMENT
23 Y/O female with a PMHx T1DM, HTN, CKD stage V admitted with weakness. AVF cancelled on Friday 2/2 blood glucose in the 400s. Seen by Endocrinology and Medicine documenting clearance for planned procedure. Medicine recommending no further cardiac clearance is needed after further review. Clear from medicine standpoint for planned procedure. Tentatively scheduled for AVF vs graft on Thursday. Patient should receive HD on Wednesday prior to planned procedure. Clear for discharge with return to hospital for planned procedure on Thursday from Vascular standpoint.     PLAN:     - OR planning for Thursday as outpatient  - Continue HD per nephrology  - Appreciate endocrinology recommendations   - Continue DM management per primary team  - Continue care per primary team  - Discussed with Dr. Rivero  23 Y/O female with a PMHx T1DM, HTN, CKD stage V admitted with weakness. AVF cancelled on Friday 2/2 blood glucose in the 400s. Seen by Endocrinology and Medicine documenting clearance for planned procedure. Medicine recommending no further cardiac clearance is needed after further review. Clear from medicine standpoint for planned procedure. Tentatively scheduled for AVF vs graft on Thursday. Patient should receive HD on Wednesday prior to planned procedure. Clear for discharge with return to hospital for planned procedure on Thursday from Vascular standpoint.   Now s/p permacath placement.     PLAN:     - OR planning for Thursday as outpatient  - Continue HD per nephrology  - Appreciate endocrinology recommendations   - Continue DM management per primary team  - Continue care per primary team  - Discussed with Dr. Rivero

## 2025-01-13 NOTE — DISCHARGE NOTE PROVIDER - HOSPITAL COURSE
25 yo female PMH DM(Type I), CKD V,  HTN  presents with c/o 3 days of generalized abdominal pain, nausea, diarrhea. Patient also c/o midsternal CP and SOB.  Patient denies fevers, cough, dysuria. Patient reports she has been using normal insulin amount during illness.  on arrival to ER. Has CKD V at baseline, was told may   need to start HD sessions in near future.      DM1   ESRD on new HD   HTN     Admited to tele for nausea, decreased PO intake. Has CKD V at baseline, likely has early signs of uremia. No asterixis on exam.  Appreciate renal consult.   A1C is 8.3, average control.   Beta-hydroxybuterate .50, mild elevation, anion gap normal. No DKA. Phos elevated 6.4,  start Phoslo tid.   Creatinine 10.0 on arrival, agree renal sonogram to confirm MRD. HGB acceptable at 10.4, CXR is clear.   BP well controlled now on Lopressor 25 mg bid.       Renal sono notes MRD, large protinuria noted.   Started on HD, vein mapping done  Vein mapping done, AV fistula to be done outpatient.   Endo consulted to help with sugars.  Permacath placed today.     Patient seen and evaluated. Dc home.   DC time 37 mins

## 2025-01-13 NOTE — DISCHARGE NOTE PROVIDER - NSDCMRMEDTOKEN_GEN_ALL_CORE_FT
Admelog 100 units/mL injectable solution: 4 unit(s) injectable 3 times a day (with meals) 6 unit(s) subcutaneous 3 times a day (with meals)  alcohol swabs: Apply topically to affected area 4 times a day  calcitriol 0.25 mcg oral capsule: 1 cap(s) orally once a day  calcium acetate 667 mg oral tablet: 1 tab(s) orally once a day  Insulin Pen Needles, 4mm: 1 application subcutaneously 4 times a day. ** Use with insulin pen **  lancets: 1 application subcutaneously 4 times a day  Lantus Solostar Pen 100 units/mL subcutaneous solution: 10 unit(s) subcutaneous once a day (at bedtime) 10 unit(s) subcutaneous once a day  metoprolol tartrate 25 mg oral tablet: 1 tab(s) orally 2 times a day  pantoprazole 40 mg oral delayed release tablet: 1 tab(s) orally once a day (before a meal)

## 2025-01-13 NOTE — DISCHARGE NOTE PROVIDER - NSDCCPCAREPLAN_GEN_ALL_CORE_FT
PRINCIPAL DISCHARGE DIAGNOSIS  Diagnosis: ESRD on dialysis  Assessment and Plan of Treatment: Newly started on dialysis   Follow up with Dr. Cosem, nephrology  Follow with HD as scheduled

## 2025-01-13 NOTE — PROGRESS NOTE ADULT - REASON FOR ADMISSION
Nausea, poor po intake, CKD V, Type I DM

## 2025-01-13 NOTE — DISCHARGE NOTE PROVIDER - NSDCFUADDAPPT_GEN_ALL_CORE_FT
APPTS ARE READY TO BE MADE: [X] YES    Best Family or Patient Contact (if needed):    Additional Information about above appointments (if needed):    1: neprho  2: PCP  3: endo    Other comments or requests:

## 2025-01-13 NOTE — DISCHARGE NOTE NURSING/CASE MANAGEMENT/SOCIAL WORK - PATIENT PORTAL LINK FT
You can access the FollowMyHealth Patient Portal offered by [facility] by registering at the following website: [email or web address]. By joining [facility]’s FollowMyHealth portal, you will also be able to view your health information using other applications (apps) compatible with our system.

## 2025-01-13 NOTE — DISCHARGE NOTE NURSING/CASE MANAGEMENT/SOCIAL WORK - FINANCIAL ASSISTANCE
[facility] provides services at a reduced cost to those who are determined to be eligible through [facility]’s financial assistance program. Information regarding [facility]’s financial assistance program can be found by going to [email or web address] or by calling [phone].

## 2025-01-13 NOTE — PROGRESS NOTE ADULT - PROVIDER SPECIALTY LIST ADULT
Hospitalist
Hospitalist
Nephrology
Vascular Surgery
Vascular Surgery
Hospitalist
Hospitalist
Nephrology
Nephrology
Endocrinology
Hospitalist
Nephrology
Vascular Surgery
Hospitalist
Vascular Surgery
Vascular Surgery

## 2025-01-13 NOTE — PRE PROCEDURE NOTE - PRE PROCEDURE EVALUATION
Interventional Radiology    HPI: 24y Female with PMH DM(Type I), CKD V,  HTN  presents with c/o 3 days of generalized abdominal pain, nausea, diarrhea. Patient also c/o midsternal CP and SOB. KIKE on CKD(5) - prerenal azotemia, ATN vs rapid progression of diabetic nephropathy with malignant proteinuria. HD initiated this admission, s/p nontunneled HD catheter on 1/6. IR consulted for conversion to tunneled HD catheter.     Allergies: No Known Allergies    Medications (Abx/Cardiac/Anticoagulation/Blood Products)    metoprolol tartrate: 25 milliGRAM(s) Oral (01-13 @ 05:36)    Data:    T(C): 36.7  HR: 93  BP: 144/99  RR: 18  SpO2: 100%    Exam  General: No acute distress  Chest: Non labored breathing    -WBC 7.83 / HgB 8.6 / Hct 26.2 / Plt 352  -Na 138 / Cl 104 / BUN 67 / Glucose 63  -K 3.3 / CO2 24 / Cr 7.41  -ALT -- / Alk Phos -- / T.Bili --  -INR0.87    Imaging: Reviewed    Plan: 24y Female presents for tunneled HD catheter placement  -Risks/Benefits/alternatives explained with the patient and/or healthcare proxy and witnessed informed consent obtained.

## 2025-01-13 NOTE — PROGRESS NOTE ADULT - SUBJECTIVE AND OBJECTIVE BOX
Patient is a 24y old  Female who presents with a chief complaint of Nausea, poor po intake, CKD V, Type I DM (13 Jan 2025 14:28)      Interval History: discharged today   status post AV fistula placement , earlier had increased blood glucose around midnight requiring increased coverage  and received coverage , in AM finger sticks < 80     MEDICATIONS  (STANDING):  calcitriol   Capsule 0.25 MICROGram(s) Oral daily  calcium acetate 667 milliGRAM(s) Oral three times a day with meals  chlorhexidine 2% Cloths 1 Application(s) Topical <User Schedule>  dextrose 5%. 1000 milliLiter(s) (100 mL/Hr) IV Continuous <Continuous>  dextrose 5%. 1000 milliLiter(s) (50 mL/Hr) IV Continuous <Continuous>  dextrose 5%. 1000 milliLiter(s) (30 mL/Hr) IV Continuous <Continuous>  dextrose 50% Injectable 25 Gram(s) IV Push once  dextrose 50% Injectable 12.5 Gram(s) IV Push once  dextrose 50% Injectable 25 Gram(s) IV Push once  epoetin elif-epbx (RETACRIT) Injectable 14209 Unit(s) IV Push <User Schedule>  glucagon  Injectable 1 milliGRAM(s) IntraMuscular once  influenza   Vaccine 0.5 milliLiter(s) IntraMuscular once  insulin glargine Injectable (LANTUS) 14 Unit(s) SubCutaneous at bedtime  insulin lispro (ADMELOG) corrective regimen sliding scale   SubCutaneous at bedtime  insulin lispro (ADMELOG) corrective regimen sliding scale   SubCutaneous three times a day before meals  insulin lispro Injectable (ADMELOG) 5 Unit(s) SubCutaneous three times a day before meals  metoprolol tartrate 25 milliGRAM(s) Oral two times a day  pantoprazole    Tablet 40 milliGRAM(s) Oral before breakfast    MEDICATIONS  (PRN):  dextrose Oral Gel 15 Gram(s) Oral once PRN Blood Glucose LESS THAN 70 milliGRAM(s)/deciliter  hydrALAZINE Injectable 10 milliGRAM(s) IV Push every 6 hours PRN sbp>160  melatonin 3 milliGRAM(s) Oral at bedtime PRN Sleep  sodium chloride 0.9% lock flush 10 milliLiter(s) IV Push every 1 hour PRN Pre/post blood products, medications, blood draw, and to maintain line patency      Allergies    No Known Allergies    Intolerances            Vital Signs Last 24 Hrs  T(C): 36.8 (13 Jan 2025 13:00), Max: 36.9 (13 Jan 2025 00:02)  T(F): 98.2 (13 Jan 2025 13:00), Max: 98.4 (13 Jan 2025 00:02)  HR: 86 (13 Jan 2025 13:00) (86 - 100)  BP: 164/104 (13 Jan 2025 13:00) (144/99 - 168/112)  BP(mean): --  RR: 16 (13 Jan 2025 13:00) (15 - 19)  SpO2: 100% (13 Jan 2025 13:00) (100% - 100%)    Parameters below as of 13 Jan 2025 12:23  Patient On (Oxygen Delivery Method): room air        PHYSICAL EXAM:  GENERAL: deferred       LABS:    PT/INR - ( 13 Jan 2025 08:15 )   PT: 10.1 sec;   INR: 0.87 ratio           Urinalysis Basic - ( 13 Jan 2025 08:15 )    Color: x / Appearance: x / SG: x / pH: x  Gluc: 63 mg/dL / Ketone: x  / Bili: x / Urobili: x   Blood: x / Protein: x / Nitrite: x   Leuk Esterase: x / RBC: x / WBC x   Sq Epi: x / Non Sq Epi: x / Bacteria: x      CAPILLARY BLOOD GLUCOSE      POCT Blood Glucose.: 93 mg/dL (13 Jan 2025 11:54)  POCT Blood Glucose.: 89 mg/dL (13 Jan 2025 10:38)  POCT Blood Glucose.: 96 mg/dL (13 Jan 2025 09:29)  POCT Blood Glucose.: 97 mg/dL (13 Jan 2025 09:05)  POCT Blood Glucose.: 60 mg/dL (13 Jan 2025 08:49)  POCT Blood Glucose.: 67 mg/dL (13 Jan 2025 08:05)  POCT Blood Glucose.: 68 mg/dL (13 Jan 2025 08:02)  POCT Blood Glucose.: 298 mg/dL (13 Jan 2025 05:22)  POCT Blood Glucose.: 416 mg/dL (13 Jan 2025 00:42)  POCT Blood Glucose.: 467 mg/dL (13 Jan 2025 00:39)  POCT Blood Glucose.: 174 mg/dL (12 Jan 2025 21:32)    Lipid panel:           Thyroid:  Diabetes Tests:  Parathyroid Panel:  Adrenals:  RADIOLOGY & ADDITIONAL TESTS:    Imaging Personally Reviewed:  [ ] YES  [ ] NO    Consultant(s) Notes Reviewed:  [ ] YES  [ ] NO    Care Discussed with Consultants/Other Providers [ ] YES  [ ] NO

## 2025-01-13 NOTE — DISCHARGE NOTE PROVIDER - PROVIDER TOKENS
PROVIDER:[TOKEN:[[id number]],FOLLOWUP:[1 week]],FREE:[LAST:[Your PCP],PHONE:[(   )    -],FAX:[(   )    -]],PROVIDER:[TOKEN:[[id number]],FOLLOWUP:[2 weeks]]

## 2025-01-13 NOTE — PROGRESS NOTE ADULT - SUBJECTIVE AND OBJECTIVE BOX
[facility] NEPHROLOGY SERVICES, [facility]  NEPHROLOGY AND HYPERTENSION  [address]  [address]  [place] [address]  [phone]    MD ANTONI JEFFERSON MD YELENA ROSENBERG, MD BINNY KOSHY, MD CHRISTOPHER CAPUTO, MD EDWARD BOVER, MD          Patient events noted    MEDICATIONS  (STANDING):  calcitriol   Capsule 0.25 MICROGram(s) Oral daily  calcium acetate 667 milliGRAM(s) Oral three times a day with meals  chlorhexidine 2% Cloths 1 Application(s) Topical <User Schedule>  dextrose 5%. 1000 milliLiter(s) (100 mL/Hr) IV Continuous <Continuous>  dextrose 5%. 1000 milliLiter(s) (50 mL/Hr) IV Continuous <Continuous>  dextrose 5%. 1000 milliLiter(s) (30 mL/Hr) IV Continuous <Continuous>  dextrose 50% Injectable 25 Gram(s) IV Push once  dextrose 50% Injectable 12.5 Gram(s) IV Push once  dextrose 50% Injectable 25 Gram(s) IV Push once  epoetin elif-epbx (RETACRIT) Injectable 38975 Unit(s) IV Push <User Schedule>  glucagon  Injectable 1 milliGRAM(s) IntraMuscular once  influenza   Vaccine 0.5 milliLiter(s) IntraMuscular once  insulin glargine Injectable (LANTUS) 14 Unit(s) SubCutaneous at bedtime  insulin lispro (ADMELOG) corrective regimen sliding scale   SubCutaneous at bedtime  insulin lispro (ADMELOG) corrective regimen sliding scale   SubCutaneous three times a day before meals  insulin lispro Injectable (ADMELOG) 5 Unit(s) SubCutaneous three times a day before meals  metoprolol tartrate 25 milliGRAM(s) Oral two times a day  pantoprazole    Tablet 40 milliGRAM(s) Oral before breakfast    MEDICATIONS  (PRN):  dextrose Oral Gel 15 Gram(s) Oral once PRN Blood Glucose LESS THAN 70 milliGRAM(s)/deciliter  hydrALAZINE Injectable 10 milliGRAM(s) IV Push every 6 hours PRN sbp>160  melatonin 3 milliGRAM(s) Oral at bedtime PRN Sleep  sodium chloride 0.9% lock flush 10 milliLiter(s) IV Push every 1 hour PRN Pre/post blood products, medications, blood draw, and to maintain line patency      PHYSICAL EXAM:      T(C): 36.8 (01-13-25 @ 13:00), Max: 36.9 (01-13-25 @ 00:02)  HR: 86 (01-13-25 @ 13:00) (86 - 100)  BP: 164/104 (01-13-25 @ 13:00) (144/99 - 168/112)  RR: 16 (01-13-25 @ 13:00) (15 - 19)  SpO2: 100% (01-13-25 @ 13:00) (100% - 100%)  Wt(kg): --  Lungs clear  Heart S1S2  Abd soft NT ND  Extremities:   tr edema                                    8.6    7.83  )-----------( 352      ( 13 Jan 2025 08:15 )             26.2     01-13    138  |  104  |  67[H]  ----------------------------<  63[L]  3.3[L]   |  24  |  7.41[H]    Ca    8.3[L]      13 Jan 2025 08:15          Creatinine Trend: 7.41<--, 5.79<--, 8.38<--, 7.31<--, 5.65<--, 6.00<--      Assessment   New ESRD;       Plan:  Post perm cath today  Discharge home  HD Wednesday at [facility]    Selvin Cosme MD

## 2025-01-13 NOTE — PROVIDER CONTACT NOTE (OTHER) - REASON
Blood glucose 440
finger stick of 53, and repeat blood glucose is 52
Blood glucose 467 mg/dl and 416 mg/dl

## 2025-01-13 NOTE — PROVIDER CONTACT NOTE (OTHER) - SITUATION
Pt A/O x4 on room air. No acute distress noted. Pt received insulin Lantus 14 units at 21:57 1/12/25. Pt A/O x4 on room air. No acute distress noted. Pt received insulin Lantus 14 units at 21:57 1/12/25. Pt on NPO at this time. alert

## 2025-01-13 NOTE — DISCHARGE NOTE PROVIDER - CARE PROVIDER_API CALL
Selvin Cosme  Nephrology  [address], [address]  [place] [address]  Phone: [phone]  Fax: [phone]  Follow Up Time: 1 week    Your PCP,   Phone: (   )    -  Fax: (   )    -  Follow Up Time:     Elie Rosenberg  Endocrinology/Metab/Diabetes  [address]  [place] [address]  Phone: [phone]  Fax: [phone]  Follow Up Time: 2 weeks

## 2025-01-14 PROBLEM — I10 ESSENTIAL (PRIMARY) HYPERTENSION: Chronic | Status: ACTIVE | Noted: 2025-01-04

## 2025-01-16 PROBLEM — E10.9 TYPE 1 DIABETES MELLITUS WITHOUT COMPLICATIONS: Chronic | Status: ACTIVE | Noted: 2025-01-04

## 2025-01-16 PROBLEM — N18.9 CHRONIC KIDNEY DISEASE, UNSPECIFIED: Chronic | Status: ACTIVE | Noted: 2025-01-04

## 2025-01-17 DIAGNOSIS — E10.65 TYPE 1 DIABETES MELLITUS WITH HYPERGLYCEMIA: ICD-10-CM

## 2025-01-17 DIAGNOSIS — I13.11 HYPERTENSIVE HEART AND CHRONIC KIDNEY DISEASE WITHOUT HEART FAILURE, WITH STAGE 5 CHRONIC KIDNEY DISEASE, OR END STAGE RENAL DISEASE: ICD-10-CM

## 2025-01-17 DIAGNOSIS — E83.51 HYPOCALCEMIA: ICD-10-CM

## 2025-01-17 DIAGNOSIS — E87.20 ACIDOSIS, UNSPECIFIED: ICD-10-CM

## 2025-01-17 DIAGNOSIS — E10.22 TYPE 1 DIABETES MELLITUS WITH DIABETIC CHRONIC KIDNEY DISEASE: ICD-10-CM

## 2025-01-17 DIAGNOSIS — Z99.2 DEPENDENCE ON RENAL DIALYSIS: ICD-10-CM

## 2025-01-17 DIAGNOSIS — E87.1 HYPO-OSMOLALITY AND HYPONATREMIA: ICD-10-CM

## 2025-01-17 DIAGNOSIS — N18.6 END STAGE RENAL DISEASE: ICD-10-CM

## 2025-01-24 ENCOUNTER — TRANSCRIPTION ENCOUNTER (OUTPATIENT)
Age: 25
End: 2025-01-24

## 2025-01-24 ENCOUNTER — OUTPATIENT (OUTPATIENT)
Dept: OUTPATIENT SERVICES | Facility: HOSPITAL | Age: 25
LOS: 1 days | Discharge: HOME | End: 2025-01-24
Payer: MEDICAID

## 2025-01-24 ENCOUNTER — APPOINTMENT (OUTPATIENT)
Dept: VASCULAR SURGERY | Facility: HOSPITAL | Age: 25
End: 2025-01-24

## 2025-01-24 VITALS
DIASTOLIC BLOOD PRESSURE: 70 MMHG | TEMPERATURE: 98 F | WEIGHT: 139.99 LBS | SYSTOLIC BLOOD PRESSURE: 111 MMHG | RESPIRATION RATE: 17 BRPM | OXYGEN SATURATION: 100 % | HEART RATE: 107 BPM | HEIGHT: 62 IN

## 2025-01-24 VITALS
HEART RATE: 82 BPM | RESPIRATION RATE: 24 BRPM | OXYGEN SATURATION: 100 % | DIASTOLIC BLOOD PRESSURE: 82 MMHG | SYSTOLIC BLOOD PRESSURE: 126 MMHG

## 2025-01-24 LAB
GLUCOSE BLDC GLUCOMTR-MCNC: 116 MG/DL — HIGH (ref 70–99)
GLUCOSE BLDC GLUCOMTR-MCNC: 118 MG/DL — HIGH (ref 70–99)
HCG SERPL-ACNC: <1 MIU/ML — SIGNIFICANT CHANGE UP
POTASSIUM SERPL-MCNC: 4 MMOL/L — SIGNIFICANT CHANGE UP (ref 3.5–5.3)
POTASSIUM SERPL-SCNC: 4 MMOL/L — SIGNIFICANT CHANGE UP (ref 3.5–5.3)

## 2025-01-24 PROCEDURE — 36825 ARTERY-VEIN AUTOGRAFT: CPT | Mod: AS

## 2025-01-24 PROCEDURE — 36821 AV FUSION DIRECT ANY SITE: CPT | Mod: LT

## 2025-01-24 DEVICE — CLIP APPLIER COVIDIEN SURGICLIP III 9" SM: Type: IMPLANTABLE DEVICE | Site: LEFT | Status: FUNCTIONAL

## 2025-01-24 RX ORDER — FENTANYL CITRATE 50 UG/ML
50 INJECTION INTRAMUSCULAR; INTRAVENOUS ONCE
Refills: 0 | Status: DISCONTINUED | OUTPATIENT
Start: 2025-01-24 | End: 2025-01-26

## 2025-01-24 RX ORDER — BACTERIOSTATIC SODIUM CHLORIDE 0.9 %
3 VIAL (ML) INJECTION EVERY 8 HOURS
Refills: 0 | Status: DISCONTINUED | OUTPATIENT
Start: 2025-01-24 | End: 2025-01-24

## 2025-01-24 RX ORDER — ONDANSETRON 4 MG/1
4 TABLET, ORALLY DISINTEGRATING ORAL ONCE
Refills: 0 | Status: DISCONTINUED | OUTPATIENT
Start: 2025-01-24 | End: 2025-01-26

## 2025-01-24 RX ORDER — FENTANYL CITRATE 50 UG/ML
25 INJECTION INTRAMUSCULAR; INTRAVENOUS
Refills: 0 | Status: DISCONTINUED | OUTPATIENT
Start: 2025-01-24 | End: 2025-01-26

## 2025-01-24 RX ORDER — BACTERIOSTATIC SODIUM CHLORIDE 0.9 %
1000 VIAL (ML) INJECTION
Refills: 0 | Status: DISCONTINUED | OUTPATIENT
Start: 2025-01-24 | End: 2025-01-26

## 2025-01-24 RX ORDER — OXYCODONE HYDROCHLORIDE 30 MG/1
1 TABLET ORAL
Qty: 8 | Refills: 0
Start: 2025-01-24

## 2025-01-24 NOTE — ASU DISCHARGE PLAN (ADULT/PEDIATRIC) - NS MD DC FALL RISK RISK
For information on Fall & Injury Prevention, visit: [email or web address] OR  [email or web address] OR  https://www.cdc.gov/steadi/patient.html

## 2025-01-24 NOTE — ASU DISCHARGE PLAN (ADULT/PEDIATRIC) - CARE PROVIDER_API CALL
Julienne Rivero  Vascular Surgery  [address], [address]  [place] [address]  Phone: [phone]  Fax: [phone]  Established Patient  Follow Up Time: 2 weeks

## 2025-01-24 NOTE — BRIEF OPERATIVE NOTE - FIRST ASSIST PARTICIPATION DETAILS - (COMPONENTS OF THE PROCEDURE THAT ASSISTANT PARTICIPATED IN)
I acted within this role throughout the entirety of the procedure performed by the primary surgeon
This is a 30 year old, , female, unemployed, domiciled with  in the basement of her mother-in-law, with a medical history of asthma, hypertension, and diabetes; has upcoming surgery on 10/27/2022 for cyst at ovary; with a past psychiatric history of schizoaffective disorder, currently in outpatient treatment with nurse practitioner Betsy Lai at the [facility] in [place]. History of 2 past psychiatric hospitalizations [facility]. History of multiple past suicide attempts by taking pills and by cutting left forearm. No history of alcohol or illicit drug use. No history of aggression or violence. No current legal issues. Family history of mental health, namely mother. Family history of alcoholism, namely great grand parents. Presented to the ED via EMS after her outpatient psychiatric provider called 911 to send her to the hospital for due to concerns related to suicidality.    Patient denies acute psychiatric symptoms including current depressed mood, sherly, psychosis and no internal preoccupation noted. She reports disturbance in sleep pattern. However, she is currently requesting to go home. Collateral information from her outpatient provider warranted fo safe dispo since she was sent to the ED by her psychiatric NP.    Plan:   1. Will keep patient to the ED until collateral info from her psychiatric provider is obtained.   2. Will continue Trazodone 100mg PO HS as needed for sleep.  3. Will start Haldol upon verifying her dose from her pharmacy.

## 2025-01-24 NOTE — H&P ADULT - ASSESSMENT
24 yoF with ESRD on HD  Plan for left arm avf creation, possible graft.  Risks, benefits, and alternatives of the procedure were discussed with the patient. All questions were answered. She agrees to proceed with the procedure.  Risks discussed included but not limited to risk of bleeding, infection, damage to surrounding structures, arterial steal, nerve injury, need for additional procedures.

## 2025-01-24 NOTE — H&P ADULT - HISTORY OF PRESENT ILLNESS
24 year old female with DM, HTN, CKD. Recent hospitalization for initiation for hemodialysis. Currently has permcath for access.  Right hand dominant female. Presents today for fistula creation.

## 2025-01-24 NOTE — BRIEF OPERATIVE NOTE - NSICDXBRIEFPROCEDURE_GEN_ALL_CORE_FT
PROCEDURES:  Creation of arteriovenous fistula using cephalic vein 24-Jan-2025 16:55:09  Enedina Brown

## 2025-01-24 NOTE — BRIEF OPERATIVE NOTE - OPERATION/FINDINGS
left upper extremity AV fistula creation with cephalic vein and radial artery, please see full operative report

## 2025-01-24 NOTE — ASU DISCHARGE PLAN (ADULT/PEDIATRIC) - FINANCIAL ASSISTANCE
[facility] provides services at a reduced cost to those who are determined to be eligible through [facility]’s financial assistance program. Information regarding [facility]’s financial assistance program can be found by going to [email or web address] or by calling [phone].

## 2025-01-24 NOTE — H&P ADULT - NSHPPHYSICALEXAM_GEN_ALL_CORE
GENERAL: NAD  ENMT: mucous membranes moist  NECK: supple, No JVD  CHEST/LUNG: clear to auscultation bilaterally; no rales, rhonchi, or wheezing b/l  HEART: normal S1, S2  ABDOMEN: BS+, soft, ND, NT   EXTREMITIES:  pulses palpable; no clubbing, cyanosis, or edema b/l LEs, Braulio's test left hand normal.  NEURO: awake, alert, interactive; moves all extremities

## 2025-01-24 NOTE — ASU DISCHARGE PLAN (ADULT/PEDIATRIC) - ASU DC SPECIAL INSTRUCTIONSFT
Follow up with Dr. Rivero in 2-4 weeks. Please call to schedule an appointment.  Please take Tylenol 1000mg  every 6 hours as needed for pain. Please take Oxycodone ONLY for BREAKTHROUGH severe pain, as prescribed.    NOTIFY YOUR SURGEON IF: You have any bleeding that does not stop, any pus draining from your wound, any fever (over 100.4 F) or chills, persistent nausea/vomiting, persistent diarrhea, or if your pain is not controlled on your discharge pain medications.    Wound: You may take off outer pink dressing and shower after 48 hours. After showering, pat steri strips dry. Leave the white steri strips in place, they will fall off on their own in approximately 5-7 days or they will be removed at your follow up appointment.    Do not lift more than 15 lbs until cleared to do so by your surgeon.

## 2025-01-26 ENCOUNTER — EMERGENCY (EMERGENCY)
Facility: HOSPITAL | Age: 25
LOS: 0 days | Discharge: HOME | End: 2025-01-26
Attending: STUDENT IN AN ORGANIZED HEALTH CARE EDUCATION/TRAINING PROGRAM
Payer: MEDICAID

## 2025-01-26 VITALS
WEIGHT: 139.99 LBS | SYSTOLIC BLOOD PRESSURE: 159 MMHG | HEIGHT: 62 IN | HEART RATE: 78 BPM | TEMPERATURE: 98 F | DIASTOLIC BLOOD PRESSURE: 118 MMHG | RESPIRATION RATE: 17 BRPM

## 2025-01-26 VITALS
SYSTOLIC BLOOD PRESSURE: 145 MMHG | HEART RATE: 102 BPM | TEMPERATURE: 98 F | OXYGEN SATURATION: 100 % | DIASTOLIC BLOOD PRESSURE: 91 MMHG | RESPIRATION RATE: 18 BRPM

## 2025-01-26 DIAGNOSIS — R40.4 TRANSIENT ALTERATION OF AWARENESS: ICD-10-CM

## 2025-01-26 DIAGNOSIS — Z96.41 PRESENCE OF INSULIN PUMP (EXTERNAL) (INTERNAL): ICD-10-CM

## 2025-01-26 DIAGNOSIS — N18.5 CHRONIC KIDNEY DISEASE, STAGE 5: ICD-10-CM

## 2025-01-26 DIAGNOSIS — E10.22 TYPE 1 DIABETES MELLITUS WITH DIABETIC CHRONIC KIDNEY DISEASE: ICD-10-CM

## 2025-01-26 DIAGNOSIS — R00.0 TACHYCARDIA, UNSPECIFIED: ICD-10-CM

## 2025-01-26 DIAGNOSIS — Z79.4 LONG TERM (CURRENT) USE OF INSULIN: ICD-10-CM

## 2025-01-26 DIAGNOSIS — E10.649 TYPE 1 DIABETES MELLITUS WITH HYPOGLYCEMIA WITHOUT COMA: ICD-10-CM

## 2025-01-26 DIAGNOSIS — I12.0 HYPERTENSIVE CHRONIC KIDNEY DISEASE WITH STAGE 5 CHRONIC KIDNEY DISEASE OR END STAGE RENAL DISEASE: ICD-10-CM

## 2025-01-26 DIAGNOSIS — D72.829 ELEVATED WHITE BLOOD CELL COUNT, UNSPECIFIED: ICD-10-CM

## 2025-01-26 LAB
ALBUMIN SERPL ELPH-MCNC: 2.1 G/DL — LOW (ref 3.3–5)
ALP SERPL-CCNC: 113 U/L — SIGNIFICANT CHANGE UP (ref 40–120)
ALT FLD-CCNC: <6 U/L — LOW (ref 12–78)
ANION GAP SERPL CALC-SCNC: 9 MMOL/L — SIGNIFICANT CHANGE UP (ref 5–17)
APPEARANCE UR: ABNORMAL
AST SERPL-CCNC: 19 U/L — SIGNIFICANT CHANGE UP (ref 15–37)
BACTERIA # UR AUTO: ABNORMAL /HPF
BASOPHILS # BLD AUTO: 0.05 K/UL — SIGNIFICANT CHANGE UP (ref 0–0.2)
BASOPHILS NFR BLD AUTO: 0.3 % — SIGNIFICANT CHANGE UP (ref 0–2)
BILIRUB SERPL-MCNC: 0.2 MG/DL — SIGNIFICANT CHANGE UP (ref 0.2–1.2)
BILIRUB UR-MCNC: NEGATIVE — SIGNIFICANT CHANGE UP
BUN SERPL-MCNC: 35 MG/DL — HIGH (ref 7–23)
CALCIUM SERPL-MCNC: 8.3 MG/DL — LOW (ref 8.5–10.1)
CHLORIDE SERPL-SCNC: 102 MMOL/L — SIGNIFICANT CHANGE UP (ref 96–108)
CO2 SERPL-SCNC: 24 MMOL/L — SIGNIFICANT CHANGE UP (ref 22–31)
COLOR SPEC: YELLOW — SIGNIFICANT CHANGE UP
CREAT SERPL-MCNC: 7.64 MG/DL — HIGH (ref 0.5–1.3)
DIFF PNL FLD: ABNORMAL
EGFR: 7 ML/MIN/1.73M2 — LOW
EOSINOPHIL # BLD AUTO: 0.05 K/UL — SIGNIFICANT CHANGE UP (ref 0–0.5)
EOSINOPHIL NFR BLD AUTO: 0.3 % — SIGNIFICANT CHANGE UP (ref 0–6)
EPI CELLS # UR: PRESENT
FLUAV AG NPH QL: SIGNIFICANT CHANGE UP
FLUBV AG NPH QL: SIGNIFICANT CHANGE UP
GLUCOSE SERPL-MCNC: 259 MG/DL — HIGH (ref 70–99)
GLUCOSE UR QL: 500 MG/DL
HCG SERPL-ACNC: <1 MIU/ML — SIGNIFICANT CHANGE UP
HCG UR QL: NEGATIVE — SIGNIFICANT CHANGE UP
HCT VFR BLD CALC: 27 % — LOW (ref 34.5–45)
HGB BLD-MCNC: 8.3 G/DL — LOW (ref 11.5–15.5)
IMM GRANULOCYTES NFR BLD AUTO: 0.5 % — SIGNIFICANT CHANGE UP (ref 0–0.9)
KETONES UR-MCNC: NEGATIVE MG/DL — SIGNIFICANT CHANGE UP
LEUKOCYTE ESTERASE UR-ACNC: NEGATIVE — SIGNIFICANT CHANGE UP
LYMPHOCYTES # BLD AUTO: 0.87 K/UL — LOW (ref 1–3.3)
LYMPHOCYTES # BLD AUTO: 5.7 % — LOW (ref 13–44)
MCHC RBC-ENTMCNC: 29.3 PG — SIGNIFICANT CHANGE UP (ref 27–34)
MCHC RBC-ENTMCNC: 30.7 G/DL — LOW (ref 32–36)
MCV RBC AUTO: 95.4 FL — SIGNIFICANT CHANGE UP (ref 80–100)
MONOCYTES # BLD AUTO: 0.97 K/UL — HIGH (ref 0–0.9)
MONOCYTES NFR BLD AUTO: 6.4 % — SIGNIFICANT CHANGE UP (ref 2–14)
NEUTROPHILS # BLD AUTO: 13.15 K/UL — HIGH (ref 1.8–7.4)
NEUTROPHILS NFR BLD AUTO: 86.8 % — HIGH (ref 43–77)
NITRITE UR-MCNC: NEGATIVE — SIGNIFICANT CHANGE UP
NRBC # BLD: 0 /100 WBCS — SIGNIFICANT CHANGE UP (ref 0–0)
PH UR: 7.5 — SIGNIFICANT CHANGE UP (ref 5–8)
PLATELET # BLD AUTO: 403 K/UL — HIGH (ref 150–400)
POTASSIUM SERPL-MCNC: 4.3 MMOL/L — SIGNIFICANT CHANGE UP (ref 3.5–5.3)
POTASSIUM SERPL-SCNC: 4.3 MMOL/L — SIGNIFICANT CHANGE UP (ref 3.5–5.3)
PROT SERPL-MCNC: 6.1 GM/DL — SIGNIFICANT CHANGE UP (ref 6–8.3)
PROT UR-MCNC: >=1000 MG/DL
RBC # BLD: 2.83 M/UL — LOW (ref 3.8–5.2)
RBC # FLD: 15.4 % — HIGH (ref 10.3–14.5)
RBC CASTS # UR COMP ASSIST: 4 /HPF — SIGNIFICANT CHANGE UP (ref 0–4)
RSV RNA NPH QL NAA+NON-PROBE: SIGNIFICANT CHANGE UP
SARS-COV-2 RNA SPEC QL NAA+PROBE: SIGNIFICANT CHANGE UP
SODIUM SERPL-SCNC: 135 MMOL/L — SIGNIFICANT CHANGE UP (ref 135–145)
SP GR SPEC: 1.01 — SIGNIFICANT CHANGE UP (ref 1–1.03)
UROBILINOGEN FLD QL: 0.2 MG/DL — SIGNIFICANT CHANGE UP (ref 0.2–1)
WBC # BLD: 15.17 K/UL — HIGH (ref 3.8–10.5)
WBC # FLD AUTO: 15.17 K/UL — HIGH (ref 3.8–10.5)
WBC UR QL: 0 /HPF — SIGNIFICANT CHANGE UP (ref 0–5)

## 2025-01-26 PROCEDURE — 71045 X-RAY EXAM CHEST 1 VIEW: CPT | Mod: 26

## 2025-01-26 PROCEDURE — 99284 EMERGENCY DEPT VISIT MOD MDM: CPT | Mod: 25

## 2025-01-26 RX ORDER — SODIUM CHLORIDE 9 MG/ML
500 INJECTION, SOLUTION INTRAMUSCULAR; INTRAVENOUS; SUBCUTANEOUS ONCE
Refills: 0 | Status: COMPLETED | OUTPATIENT
Start: 2025-01-26 | End: 2025-01-26

## 2025-01-26 RX ORDER — NIFEDIPINE 60 MG/1
90 TABLET, EXTENDED RELEASE ORAL ONCE
Refills: 0 | Status: COMPLETED | OUTPATIENT
Start: 2025-01-26 | End: 2025-01-26

## 2025-01-26 RX ADMIN — NIFEDIPINE 90 MILLIGRAM(S): 60 TABLET, EXTENDED RELEASE ORAL at 08:02

## 2025-01-26 NOTE — ED PROVIDER NOTE - CLINICAL SUMMARY MEDICAL DECISION MAKING FREE TEXT BOX
Kimani Moya, EM NP Fellow: Patient is 25yo F PMHx T1DM, HTN, CKD stage V recently started HD via R CW PermCath, new L medial FA AV fistula placed on 01/24/25, presents via  called by mother with c/o unresponsiveness upon trying to wake patient up this morning, patient was found to be hypoglycemic to 43, was given D10 via EMS. Per patient report she took her normal dose of 18 units Lantus and sliding scale insulin before bed last night, ate crackers which is normal for her. Patient endorses her insulin pump battery is currently dead,  Per mother report patient has been more drowsy in the mornings recently. Patient denies fevers, chills, HA, dizziness, CP, SOB, abd pain, N/V/D, dysuria.  Patient well appearing, nontoxic, and in no apparent distress. Physical exam as above, pertinent for breathing unlabored with lungs CTA B/L, heart RRR, abdomen soft, non-distended, non-TTP, R CW PermCath C/D/I, new L medial FA AV fistula site TTP without evidence of surrounding erythema, mild swelling, radial pulse 2+.  ddx concerning for but not limited to hypoglycemia. Little concern for acute infectious process in the setting of grossly benign exam as above.  will order labs, q1hr finger sticks, and reassess.  If blood sugars remain stable patient will likely be discharged home with close endocrine f/u.   Mother at bedside contributing to history and shared decision making. Patient and mother aware of and in agreement with plan of care.  MD Sanches aware of and in agreement with plan of care.

## 2025-01-26 NOTE — ED PROVIDER NOTE - NSFOLLOWUPINSTRUCTIONS_ED_ALL_ED_FT
- You were seen in the ER today for hypoglycemia.    - Blood work, urinalysis. chest xray, flu covid swab was performed which were within normal limits, please see attached results below.    - Please continue all medications as prescribed.     - Please follow up with your endocrinologist and nephrologist within 48-72 hours, bring a copy of your results. Please discuss insulin dosages.     - Patient return to the ER for severe abdominal pain, uncontrolled vomiting, blood in the vomit or stool, fever unresponsive to Tylenol or Motrin, no urine output in more than 8 hours, chest pain, difficulty breathing, low blood sugar, heart racing, dizziness, passing out, change in behavior or mental status, or other concerns.     Hypoglycemia    Hypoglycemia occurs when the glucose (sugar) level in your blood is too low. Symptoms include confusion, weakness, or fainting. You may even appear to be having a stroke. Take medications exactly as prescribed by your health care professional. Maintain a healthy lifestyle and follow up with your primary care physician.    SEEK IMMEDIATE MEDICAL CARE IF YOU HAVE ANY OF THE FOLLOWING SYMPTOMS: weakness, fainting, change in mental status, nausea or vomiting, fruity smell to your breath, or any signs of dehydration.

## 2025-01-26 NOTE — ED ADULT TRIAGE NOTE - CHIEF COMPLAINT QUOTE
BIBEMS from home. Pt c/o HTN, hypoglycemia Fs 43 as per EMS. Pt given glucagon D10. Right 22 G on the right hand. Pt on ESRD HD MWF. Chest and left arm fistula. NKDA
stated

## 2025-01-26 NOTE — ED PROVIDER NOTE - PHYSICAL EXAMINATION
CONSTITUTIONAL: A&O x3, NAD, resting comfortably, well groomed  HEAD: Normocephalic, atraumatic.  NECK:  Airway patent. Neck Supple.   CARDIAC: RRR, normal S1/2, no murmurs, rubs, gallops. CW non-TTP, no CW deformity. peripheral pulses 2+ B/L radial, DP. R CW PermCath C/D/I.   RESPIRATORY: CTA B/L, good aeration. No wheezing, rales, adventitious breath sounds. No accessory muscle use.    ABDOMEN: soft, non-distended; non-TTP, No RUQ/RLQ/LUQ/LLQ pain, no rebound tenderness or guarding, BS + x4 quadrants, no pulsating masses, scars. No CVA tenderness.  MSK: Moving all extremities. Full ROM and Strength 5+/5+ B/L upper and lower extremities. Soft compartments LUE, new AV fistula site left medial FA, mild edema, +TTP, no surrounding erythema.   SKIN: Warm, dry, color WNL, no turgor, erythema or rashes. Cap refill < 2 sec.  NEURO: A&Ox3, interactive, cooperative, no focal deficits.

## 2025-01-26 NOTE — ED ADULT NURSE NOTE - CHIEF COMPLAINT QUOTE
BIBEMS from home. Pt c/o HTN, hypoglycemia Fs 43 as per EMS. Pt given glucagon D10. Right 22 G on the right hand. Pt on ESRD HD MWF. Chest and left arm fistula. NKDA

## 2025-01-26 NOTE — ED PROVIDER NOTE - PROGRESS NOTE DETAILS
Kimani Moya, EM NP Fellow: Patient with wet read cxr without signs of consolidation. flu covid negative. awaiting urinalysis. patient remains slightly tachycardic to low 100s. will continue to monitor. MD Sanches aware of and in agreement with plan of care. Kimani Moya, EM NP Fellow: Patient with leukocytosis of 15.17, will order flu/covid, UA, cxr r/o infectious process. Patient remains asymptomatic. MD Sanches aware of and in agreement with plan of care. Kimani Moya, EM NP Fellow: All lab and radiology results have been discussed with patient. Patient is ready for discharge home. Vital signs reviewed , downtrending, 500cc bolus NS ordered but patient's PIV infiltrated, refusing new PIV, patient tolerating PO, educated to gently hydrate orally.  Patient educated on strict return precautions, patient to have prompt f/u with endocrine/renal. Patient provided time to ask questions.  All questions were answered at bedside with reasons to return explained at length. MD Sanches aware of and in agreement with plan of care.

## 2025-01-26 NOTE — ED PROVIDER NOTE - PATIENT PORTAL LINK FT
You can access the FollowMyHealth Patient Portal offered by [facility] by registering at the following website: [email or web address]. By joining [facility]’s FollowMyHealth portal, you will also be able to view your health information using other applications (apps) compatible with our system.

## 2025-01-26 NOTE — ED PROVIDER NOTE - ATTENDING APP SHARED VISIT CONTRIBUTION OF CARE
23 y/o F hx of DM, HTN, CKD recently started on dialysis , presents w/ hypoglycemia. mother found patient unresponsive this morning. FSG 43, given D10 by ems. states she took 18 units lantus before bed last night which is her normal dose, denies possibly taking more. ate crackers before takign the insulin at night. has insulin pump but battery is dead and is NOT on. mother at bedside states she has been more lightheaded/drowsy in the mornings since starting dialysis. denies any acute complaints currently. denies sulfonyulrea use. denies chest pain/sob. denies abdominal pain. denies fever/chills. denies dysuria.     General: Well appearing female in no acute distress  HEENT: Normocephalic, atraumatic. Moist mucous membranes. Oropharynx clear. No lymphadenopathy.  Eyes: No scleral icterus. EOMI. [place].  Neck:. Soft and supple. Full ROM without pain. No midline tenderness  Cardiac: Regular rate and regular rhythm. No murmurs, rubs, gallops. Peripheral pulses 2+ and symmetric. No LE edema.  Resp: Lungs CTAB. Speaking in full sentences. No wheezes, rales or rhonchi.  Abd: Soft, non-tender, non-distended. No guarding or rebound. No scars, masses, or lesions.  Back: Spine midline and non-tender. No CVA tenderness.    Skin: No rashes, abrasions, or lacerations.  Neuro: AO x 3. Moves all extremities symmetrically. Motor strength and sensation grossly intact.      benign exam on arrivall, no focal deficits  q1h fsg to start to assess to see if FSG is stable. will give PO as well to maintain sugar level  consider hypoglycemia in setting of new onset dialysis  consider infectious etiology  trend vitals.   will need pcp/endocrine f/u outpatient to discuss insulin regimen given new onset of her dialysis. mother/patient is aware.     I performed a history and physical exam of the patient and discussed their management with the JUAN. I have reviewed the JUAN note and agree with the documented findings and plan of care, except as noted. This was a shared visit with an JUAN. I reviewed and verified the documentation and independently performed my own history/exam/medical decision making. My medical decision making and observations are found above. Please refer to any progress notes for updates on clinical course.

## 2025-01-26 NOTE — ED ADULT NURSE NOTE - OBJECTIVE STATEMENT
BIBEMS from home. Pt c/o HTN, hypoglycemia Fs 43 as per EMS. Pt given glucagon D10. Right 22 G on the right hand. Pt on ESRD HD MWF. Chest and left arm fistula. NKDA BIBEMS from home, mother at the bedside states that she found pt this am unresponsive and called EMS. Has been taking her insulin as usual. Pt also presents with HTN. FS 43 as per EMS. Pt given glucagon D10. 22 G IV on the right hand. Pt on ESRD HD MWF. Did not miss any dialysis sessions. Chest and left arm fistula. NKDA.

## 2025-01-27 DIAGNOSIS — I12.0 HYPERTENSIVE CHRONIC KIDNEY DISEASE WITH STAGE 5 CHRONIC KIDNEY DISEASE OR END STAGE RENAL DISEASE: ICD-10-CM

## 2025-01-27 DIAGNOSIS — Z79.4 LONG TERM (CURRENT) USE OF INSULIN: ICD-10-CM

## 2025-01-27 DIAGNOSIS — Z99.2 DEPENDENCE ON RENAL DIALYSIS: ICD-10-CM

## 2025-01-27 DIAGNOSIS — E10.22 TYPE 1 DIABETES MELLITUS WITH DIABETIC CHRONIC KIDNEY DISEASE: ICD-10-CM

## 2025-01-27 DIAGNOSIS — N18.6 END STAGE RENAL DISEASE: ICD-10-CM

## 2025-02-10 ENCOUNTER — APPOINTMENT (OUTPATIENT)
Dept: VASCULAR SURGERY | Facility: CLINIC | Age: 25
End: 2025-02-10

## 2025-02-13 ENCOUNTER — INPATIENT (INPATIENT)
Facility: HOSPITAL | Age: 25
LOS: 2 days | Discharge: HOME | End: 2025-02-16
Attending: GENERAL ACUTE CARE HOSPITAL | Admitting: GENERAL ACUTE CARE HOSPITAL
Payer: MEDICAID

## 2025-02-13 VITALS
DIASTOLIC BLOOD PRESSURE: 108 MMHG | WEIGHT: 130.07 LBS | SYSTOLIC BLOOD PRESSURE: 161 MMHG | HEART RATE: 135 BPM | HEIGHT: 62 IN | OXYGEN SATURATION: 98 % | TEMPERATURE: 98 F

## 2025-02-13 LAB
ALBUMIN SERPL ELPH-MCNC: 1.8 G/DL — LOW (ref 3.3–5)
ALP SERPL-CCNC: 97 U/L — SIGNIFICANT CHANGE UP (ref 40–120)
ALT FLD-CCNC: 13 U/L — SIGNIFICANT CHANGE UP (ref 12–78)
ANION GAP SERPL CALC-SCNC: 8 MMOL/L — SIGNIFICANT CHANGE UP (ref 5–17)
AST SERPL-CCNC: 41 U/L — HIGH (ref 15–37)
BASOPHILS # BLD AUTO: 0.05 K/UL — SIGNIFICANT CHANGE UP (ref 0–0.2)
BASOPHILS NFR BLD AUTO: 0.2 % — SIGNIFICANT CHANGE UP (ref 0–2)
BILIRUB SERPL-MCNC: 0.4 MG/DL — SIGNIFICANT CHANGE UP (ref 0.2–1.2)
BUN SERPL-MCNC: 33 MG/DL — HIGH (ref 7–23)
CALCIUM SERPL-MCNC: 7.9 MG/DL — LOW (ref 8.5–10.1)
CHLORIDE SERPL-SCNC: 100 MMOL/L — SIGNIFICANT CHANGE UP (ref 96–108)
CO2 SERPL-SCNC: 27 MMOL/L — SIGNIFICANT CHANGE UP (ref 22–31)
CREAT SERPL-MCNC: 5.57 MG/DL — HIGH (ref 0.5–1.3)
EGFR: 10 ML/MIN/1.73M2 — LOW
EOSINOPHIL # BLD AUTO: 0.13 K/UL — SIGNIFICANT CHANGE UP (ref 0–0.5)
EOSINOPHIL NFR BLD AUTO: 0.6 % — SIGNIFICANT CHANGE UP (ref 0–6)
ETHANOL SERPL-MCNC: <10 MG/DL — SIGNIFICANT CHANGE UP (ref 0–10)
FLUAV AG NPH QL: SIGNIFICANT CHANGE UP
FLUBV AG NPH QL: SIGNIFICANT CHANGE UP
GLUCOSE SERPL-MCNC: 239 MG/DL — HIGH (ref 70–99)
HCG SERPL-ACNC: <1 MIU/ML — SIGNIFICANT CHANGE UP
HCT VFR BLD CALC: 34 % — LOW (ref 34.5–45)
HGB BLD-MCNC: 11 G/DL — LOW (ref 11.5–15.5)
IMM GRANULOCYTES NFR BLD AUTO: 1.3 % — HIGH (ref 0–0.9)
LYMPHOCYTES # BLD AUTO: 0.67 K/UL — LOW (ref 1–3.3)
LYMPHOCYTES # BLD AUTO: 3.1 % — LOW (ref 13–44)
MCHC RBC-ENTMCNC: 29.6 PG — SIGNIFICANT CHANGE UP (ref 27–34)
MCHC RBC-ENTMCNC: 32.4 G/DL — SIGNIFICANT CHANGE UP (ref 32–36)
MCV RBC AUTO: 91.6 FL — SIGNIFICANT CHANGE UP (ref 80–100)
MONOCYTES # BLD AUTO: 0.91 K/UL — HIGH (ref 0–0.9)
MONOCYTES NFR BLD AUTO: 4.2 % — SIGNIFICANT CHANGE UP (ref 2–14)
NEUTROPHILS # BLD AUTO: 19.5 K/UL — HIGH (ref 1.8–7.4)
NEUTROPHILS NFR BLD AUTO: 90.6 % — HIGH (ref 43–77)
NRBC BLD AUTO-RTO: 0 /100 WBCS — SIGNIFICANT CHANGE UP (ref 0–0)
PLATELET # BLD AUTO: 345 K/UL — SIGNIFICANT CHANGE UP (ref 150–400)
POTASSIUM SERPL-MCNC: 4.9 MMOL/L — SIGNIFICANT CHANGE UP (ref 3.5–5.3)
POTASSIUM SERPL-SCNC: 4.9 MMOL/L — SIGNIFICANT CHANGE UP (ref 3.5–5.3)
PROT SERPL-MCNC: 5.6 GM/DL — LOW (ref 6–8.3)
RBC # BLD: 3.71 M/UL — LOW (ref 3.8–5.2)
RBC # FLD: 14.6 % — HIGH (ref 10.3–14.5)
RSV RNA NPH QL NAA+NON-PROBE: SIGNIFICANT CHANGE UP
SARS-COV-2 RNA SPEC QL NAA+PROBE: SIGNIFICANT CHANGE UP
SODIUM SERPL-SCNC: 135 MMOL/L — SIGNIFICANT CHANGE UP (ref 135–145)
WBC # BLD: 21.54 K/UL — HIGH (ref 3.8–10.5)
WBC # FLD AUTO: 21.54 K/UL — HIGH (ref 3.8–10.5)

## 2025-02-13 PROCEDURE — 71045 X-RAY EXAM CHEST 1 VIEW: CPT | Mod: 26

## 2025-02-13 PROCEDURE — 93010 ELECTROCARDIOGRAM REPORT: CPT

## 2025-02-13 PROCEDURE — 70450 CT HEAD/BRAIN W/O DYE: CPT | Mod: 26

## 2025-02-13 PROCEDURE — 99285 EMERGENCY DEPT VISIT HI MDM: CPT

## 2025-02-13 NOTE — ED ADULT TRIAGE NOTE - CHIEF COMPLAINT QUOTE
BIBA as per EMS hypoglycemia, 56 at home Dextrose given 200 ml. g. 22 IV on right, chest port on the right chest, Fistula on the left arm. h/o ESRD, Juvenile Diabetes,. dry blood noted on the mouth EMS states pt said she had a seizure and bit her tongue. pt crying in tirage doesn't want to answer to questions.

## 2025-02-13 NOTE — ED ADULT NURSE NOTE - NSFALLRISKINTERV_ED_ALL_ED

## 2025-02-13 NOTE — ED ADULT NURSE NOTE - OBJECTIVE STATEMENT
25 yo F PMHx ESRD, DM, HTN BIBEMS c/o hypoglycemia, sz at home. Per EMS FS on scene was 56 and was given D10 200 mL via 22G IV on right. Pt has R chest port. Pt with dry blood around lips, noted with tongue bite, bleeding controlled. Pt loudly crying on arrival, refuses or unable to speak, and unable to answer questions.

## 2025-02-13 NOTE — ED ADULT TRIAGE NOTE - ACCOMPANIED BY
----- Message from Loa Bence, APRN - CNP sent at 6/8/2021  3:30 PM EDT -----  Please let patient know that RUQ US demonstrates Status post cholecystectomy and is otherwise unremarkable RUQ US. Please let her know that Dr. Betzy Lauren is out of the office today. I will leave for her review as she returns tomorrow. Continue with current plan per Dr. Betzy Lauren. GI consult, and medications as prescribed. Further issues or concerns or questions,I recommend following up with Dr. Betzy Lauren. Let her know we are here to help. Thanks, JADE Longo.  Jane Benton
Self

## 2025-02-13 NOTE — ED ADULT NURSE NOTE - CADM POA PRESS ULCER
Went to  inhaler prescribed yesterday and cost was $87.    Declined to .    Patient states he is now vomited 4x last night, has chills with sinus congestion, cough and Sore Throat. No fever.     Patient is requesting an atbx be sent into the pharmacy along with a lower cost inhaler.      Patient states he is drinking water to try and stay hydrated and urinating fine.     Patient advised you are currently out of the office and will be in this afternoon.    No no

## 2025-02-13 NOTE — ED ADULT NURSE NOTE - NS ED NURSE LEVEL OF CONSCIOUSNESS MENTAL STATUS
[facility] [place]  [address] [facility] [address]  [phone]    Gladis DOUG Church  [address] [facility] [address]      9/15/2021    To Whom It May Concern,    Please allow Jf Church to be excused from work to attend appointments and help with care/treatment of her medical conditions.  Please contact my office with any questions/concerns.      Sincerely,           ROSE MARIE Mariano   Awake/Alert/Confused

## 2025-02-14 DIAGNOSIS — I10 ESSENTIAL (PRIMARY) HYPERTENSION: ICD-10-CM

## 2025-02-14 DIAGNOSIS — E16.2 HYPOGLYCEMIA, UNSPECIFIED: ICD-10-CM

## 2025-02-14 DIAGNOSIS — Z29.9 ENCOUNTER FOR PROPHYLACTIC MEASURES, UNSPECIFIED: ICD-10-CM

## 2025-02-14 DIAGNOSIS — N18.6 END STAGE RENAL DISEASE: ICD-10-CM

## 2025-02-14 DIAGNOSIS — E10.9 TYPE 1 DIABETES MELLITUS WITHOUT COMPLICATIONS: ICD-10-CM

## 2025-02-14 LAB
ALBUMIN SERPL ELPH-MCNC: 2 G/DL — LOW (ref 3.3–5)
ALP SERPL-CCNC: 104 U/L — SIGNIFICANT CHANGE UP (ref 40–120)
ALT FLD-CCNC: 12 U/L — SIGNIFICANT CHANGE UP (ref 12–78)
ANION GAP SERPL CALC-SCNC: 9 MMOL/L — SIGNIFICANT CHANGE UP (ref 5–17)
AST SERPL-CCNC: 22 U/L — SIGNIFICANT CHANGE UP (ref 15–37)
BASOPHILS # BLD AUTO: 0.03 K/UL — SIGNIFICANT CHANGE UP (ref 0–0.2)
BASOPHILS NFR BLD AUTO: 0.3 % — SIGNIFICANT CHANGE UP (ref 0–2)
BILIRUB SERPL-MCNC: 0.3 MG/DL — SIGNIFICANT CHANGE UP (ref 0.2–1.2)
BUN SERPL-MCNC: 45 MG/DL — HIGH (ref 7–23)
CALCIUM SERPL-MCNC: 8.3 MG/DL — LOW (ref 8.5–10.1)
CHLORIDE SERPL-SCNC: 96 MMOL/L — SIGNIFICANT CHANGE UP (ref 96–108)
CO2 SERPL-SCNC: 27 MMOL/L — SIGNIFICANT CHANGE UP (ref 22–31)
CREAT SERPL-MCNC: 7.21 MG/DL — HIGH (ref 0.5–1.3)
EGFR: 7 ML/MIN/1.73M2 — LOW
EOSINOPHIL # BLD AUTO: 0.11 K/UL — SIGNIFICANT CHANGE UP (ref 0–0.5)
EOSINOPHIL NFR BLD AUTO: 1.1 % — SIGNIFICANT CHANGE UP (ref 0–6)
GLUCOSE BLDC GLUCOMTR-MCNC: 113 MG/DL — HIGH (ref 70–99)
GLUCOSE BLDC GLUCOMTR-MCNC: 133 MG/DL — HIGH (ref 70–99)
GLUCOSE BLDC GLUCOMTR-MCNC: 257 MG/DL — HIGH (ref 70–99)
GLUCOSE BLDC GLUCOMTR-MCNC: 299 MG/DL — HIGH (ref 70–99)
GLUCOSE BLDC GLUCOMTR-MCNC: 355 MG/DL — HIGH (ref 70–99)
GLUCOSE SERPL-MCNC: 310 MG/DL — HIGH (ref 70–99)
HCT VFR BLD CALC: 30.5 % — LOW (ref 34.5–45)
HGB BLD-MCNC: 9.7 G/DL — LOW (ref 11.5–15.5)
IMM GRANULOCYTES NFR BLD AUTO: 0.5 % — SIGNIFICANT CHANGE UP (ref 0–0.9)
LYMPHOCYTES # BLD AUTO: 2.67 K/UL — SIGNIFICANT CHANGE UP (ref 1–3.3)
LYMPHOCYTES # BLD AUTO: 26.2 % — SIGNIFICANT CHANGE UP (ref 13–44)
MCHC RBC-ENTMCNC: 29.8 PG — SIGNIFICANT CHANGE UP (ref 27–34)
MCHC RBC-ENTMCNC: 31.8 G/DL — LOW (ref 32–36)
MCV RBC AUTO: 93.8 FL — SIGNIFICANT CHANGE UP (ref 80–100)
MONOCYTES # BLD AUTO: 0.73 K/UL — SIGNIFICANT CHANGE UP (ref 0–0.9)
MONOCYTES NFR BLD AUTO: 7.1 % — SIGNIFICANT CHANGE UP (ref 2–14)
NEUTROPHILS # BLD AUTO: 6.62 K/UL — SIGNIFICANT CHANGE UP (ref 1.8–7.4)
NEUTROPHILS NFR BLD AUTO: 64.8 % — SIGNIFICANT CHANGE UP (ref 43–77)
NRBC BLD AUTO-RTO: 0 /100 WBCS — SIGNIFICANT CHANGE UP (ref 0–0)
PLATELET # BLD AUTO: 365 K/UL — SIGNIFICANT CHANGE UP (ref 150–400)
POTASSIUM SERPL-MCNC: 4.2 MMOL/L — SIGNIFICANT CHANGE UP (ref 3.5–5.3)
POTASSIUM SERPL-SCNC: 4.2 MMOL/L — SIGNIFICANT CHANGE UP (ref 3.5–5.3)
PROT SERPL-MCNC: 5.6 GM/DL — LOW (ref 6–8.3)
RBC # BLD: 3.25 M/UL — LOW (ref 3.8–5.2)
RBC # FLD: 14.6 % — HIGH (ref 10.3–14.5)
SODIUM SERPL-SCNC: 132 MMOL/L — LOW (ref 135–145)
WBC # BLD: 10.21 K/UL — SIGNIFICANT CHANGE UP (ref 3.8–10.5)
WBC # FLD AUTO: 10.21 K/UL — SIGNIFICANT CHANGE UP (ref 3.8–10.5)

## 2025-02-14 PROCEDURE — 74176 CT ABD & PELVIS W/O CONTRAST: CPT | Mod: 26

## 2025-02-14 PROCEDURE — 99222 1ST HOSP IP/OBS MODERATE 55: CPT

## 2025-02-14 RX ORDER — INSULIN LISPRO 100 U/ML
3 INJECTION, SOLUTION INTRAVENOUS; SUBCUTANEOUS
Refills: 0 | Status: DISCONTINUED | OUTPATIENT
Start: 2025-02-14 | End: 2025-02-15

## 2025-02-14 RX ORDER — ONDANSETRON HCL/PF 4 MG/2 ML
4 VIAL (ML) INJECTION EVERY 8 HOURS
Refills: 0 | Status: DISCONTINUED | OUTPATIENT
Start: 2025-02-14 | End: 2025-02-14

## 2025-02-14 RX ORDER — GLUCAGON 3 MG/1
1 POWDER NASAL ONCE
Refills: 0 | Status: DISCONTINUED | OUTPATIENT
Start: 2025-02-14 | End: 2025-02-16

## 2025-02-14 RX ORDER — DEXTROSE 50 % IN WATER 50 %
25 SYRINGE (ML) INTRAVENOUS ONCE
Refills: 0 | Status: DISCONTINUED | OUTPATIENT
Start: 2025-02-14 | End: 2025-02-16

## 2025-02-14 RX ORDER — LABETALOL HYDROCHLORIDE 200 MG/1
10 TABLET, FILM COATED ORAL ONCE
Refills: 0 | Status: COMPLETED | OUTPATIENT
Start: 2025-02-14 | End: 2025-02-14

## 2025-02-14 RX ORDER — ACETAMINOPHEN 500 MG/5ML
650 LIQUID (ML) ORAL EVERY 6 HOURS
Refills: 0 | Status: DISCONTINUED | OUTPATIENT
Start: 2025-02-14 | End: 2025-02-16

## 2025-02-14 RX ORDER — CEFTRIAXONE 500 MG/1
1000 INJECTION, POWDER, FOR SOLUTION INTRAMUSCULAR; INTRAVENOUS EVERY 24 HOURS
Refills: 0 | Status: DISCONTINUED | OUTPATIENT
Start: 2025-02-14 | End: 2025-02-16

## 2025-02-14 RX ORDER — DEXTROSE 50 % IN WATER 50 %
15 SYRINGE (ML) INTRAVENOUS ONCE
Refills: 0 | Status: DISCONTINUED | OUTPATIENT
Start: 2025-02-14 | End: 2025-02-16

## 2025-02-14 RX ORDER — INSULIN LISPRO 100 U/ML
INJECTION, SOLUTION INTRAVENOUS; SUBCUTANEOUS
Refills: 0 | Status: DISCONTINUED | OUTPATIENT
Start: 2025-02-14 | End: 2025-02-14

## 2025-02-14 RX ORDER — MELATONIN 5 MG
3 TABLET ORAL AT BEDTIME
Refills: 0 | Status: DISCONTINUED | OUTPATIENT
Start: 2025-02-14 | End: 2025-02-14

## 2025-02-14 RX ORDER — INSULIN LISPRO 100 U/ML
INJECTION, SOLUTION INTRAVENOUS; SUBCUTANEOUS
Refills: 0 | Status: DISCONTINUED | OUTPATIENT
Start: 2025-02-14 | End: 2025-02-16

## 2025-02-14 RX ORDER — CALCITRIOL 0.5 UG/1
0.25 CAPSULE, GELATIN COATED ORAL DAILY
Refills: 0 | Status: DISCONTINUED | OUTPATIENT
Start: 2025-02-14 | End: 2025-02-16

## 2025-02-14 RX ORDER — INSULIN GLARGINE-YFGN 100 [IU]/ML
14 INJECTION, SOLUTION SUBCUTANEOUS AT BEDTIME
Refills: 0 | Status: DISCONTINUED | OUTPATIENT
Start: 2025-02-14 | End: 2025-02-16

## 2025-02-14 RX ORDER — SODIUM CHLORIDE 9 G/1000ML
1000 INJECTION, SOLUTION INTRAVENOUS
Refills: 0 | Status: DISCONTINUED | OUTPATIENT
Start: 2025-02-14 | End: 2025-02-16

## 2025-02-14 RX ORDER — MAGNESIUM, ALUMINUM HYDROXIDE 200-200 MG
30 TABLET,CHEWABLE ORAL EVERY 4 HOURS
Refills: 0 | Status: DISCONTINUED | OUTPATIENT
Start: 2025-02-14 | End: 2025-02-14

## 2025-02-14 RX ORDER — AMLODIPINE BESYLATE 10 MG/1
5 TABLET ORAL DAILY
Refills: 0 | Status: DISCONTINUED | OUTPATIENT
Start: 2025-02-14 | End: 2025-02-16

## 2025-02-14 RX ORDER — DEXTROSE 50 % IN WATER 50 %
12.5 SYRINGE (ML) INTRAVENOUS ONCE
Refills: 0 | Status: DISCONTINUED | OUTPATIENT
Start: 2025-02-14 | End: 2025-02-16

## 2025-02-14 RX ADMIN — INSULIN LISPRO 3: 100 INJECTION, SOLUTION INTRAVENOUS; SUBCUTANEOUS at 18:48

## 2025-02-14 RX ADMIN — INSULIN LISPRO 3 UNIT(S): 100 INJECTION, SOLUTION INTRAVENOUS; SUBCUTANEOUS at 18:47

## 2025-02-14 RX ADMIN — INSULIN GLARGINE-YFGN 14 UNIT(S): 100 INJECTION, SOLUTION SUBCUTANEOUS at 21:21

## 2025-02-14 RX ADMIN — Medication 650 MILLIGRAM(S): at 01:11

## 2025-02-14 RX ADMIN — CALCITRIOL 0.25 MICROGRAM(S): 0.5 CAPSULE, GELATIN COATED ORAL at 12:28

## 2025-02-14 RX ADMIN — LABETALOL HYDROCHLORIDE 10 MILLIGRAM(S): 200 TABLET, FILM COATED ORAL at 00:22

## 2025-02-14 RX ADMIN — CEFTRIAXONE 100 MILLIGRAM(S): 500 INJECTION, POWDER, FOR SOLUTION INTRAMUSCULAR; INTRAVENOUS at 21:21

## 2025-02-14 RX ADMIN — INSULIN LISPRO 5: 100 INJECTION, SOLUTION INTRAVENOUS; SUBCUTANEOUS at 12:28

## 2025-02-14 RX ADMIN — Medication 5 MILLIGRAM(S): at 02:30

## 2025-02-14 NOTE — PATIENT PROFILE ADULT - FALL HARM RISK - RISK INTERVENTIONS
Assistance OOB with selected safe patient handling equipment/Assistance with ambulation/Communicate Fall Risk and Risk Factors to all staff, patient, and family/Discuss with provider need for PT consult/Monitor gait and stability/Reinforce activity limits and safety measures with patient and family/Visual Cue: Yellow wristband/Bed in lowest position, wheels locked, appropriate side rails in place/Call bell, personal items and telephone in reach/Instruct patient to call for assistance before getting out of bed or chair/Non-slip footwear when patient is out of bed/[place] to call system/Physically safe environment - no spills, clutter or unnecessary equipment/Purposeful Proactive Rounding/Room/bathroom lighting operational, light cord in reach

## 2025-02-14 NOTE — H&P ADULT - HISTORY OF PRESENT ILLNESS
24-year-old female with past medical history of type 1 diabetes, ESRD on HD MWF, hypertension who presented to the ED after what looked like a near syncopal episode. At the time, blood glucose was found to be 48 at home. The patient does not recall the event. She denies any preceding symptoms such as dizziness, lightheadedness, heart palpitations; denies recent illness, fevers, chills, dysuria, abdominal pain, or any other active complaints. The patient is a poor historian; reluctant to answer questions. She states that she has an insulin pump but also inject subcu lantus between 10-20 u/night. Has an endocrinologist outpatient but does not recall name/address. She denies intentional overdose, suicidal ideation.    On presentation, the patient was hypertensive otherwise stable vital signs. Labs notable for WBC 21.5, hemoglobin 11, creatinine 5.5, COVID/0/RSD negative. Blood glucose have ranged from 100 to 200s while in the ED. CT of the head was negative for any acute findings. The patient received labetalol iv in the ED.   24-year-old female with past medical history of type 1 diabetes, ESRD on HD MWF, hypertension who presented to the ED after what looked like a near syncopal episode. At the time, blood glucose was found to be 48 at home. The patient does not recall the event. She denies any preceding symptoms such as dizziness, lightheadedness, heart palpitations; denies recent illness, fevers, chills, dysuria, abdominal pain, or any other active complaints. The patient is a poor historian; reluctant to answer questions. She states that she has an insulin pump but also inject subcu lantus between 10-20 u/night. Has an endocrinologist outpatient but does not recall name/address. She denies intentional overdose, suicidal ideation.    On presentation, the patient was hypertensive otherwise stable vital signs. Labs notable for WBC 21.5, hemoglobin 11, creatinine 5.5, COVID/flu/RSV negative. Blood glucose have ranged from 100 to 200s while in the ED. CT of the head was negative for any acute findings. The patient received labetalol iv in the ED.

## 2025-02-14 NOTE — H&P ADULT - PROBLEM SELECTOR PLAN 3
- Cr 5.5 on admission(baseline around  5-6)  - HD on M-W-F  - Pt minimally urinates  - access: PermCath chest, ?L av fistula not yet matured   - Avoid nephrotoxins  - i/os  - f/u am labs  - HD as per nephro

## 2025-02-14 NOTE — ED PROVIDER NOTE - CLINICAL SUMMARY MEDICAL DECISION MAKING FREE TEXT BOX
24 year old female with DM I , HTN, ESRD with permacath and fistula developing, who presents for eval of hypoglycemic episode and witnessed seizure - pt is crying and not compliant w/ hx/ exam. Pt shakes head no to fever, abd pain, vomiting. Shrugs when asked if she took her insulin today or if she missed a meal. She denies cp/ sob. She states never had seizure before. Denies headache. As pt providing minimal hx, spoke w/ aunt who states pt has had a difficult time coping w/ illness and needing dialysis but pt did have a cough monday and did get dialysis tue and wed   physical exam as above  plan - ct head, labs to rule out metabolic derangements, serial FS, admission for serial FS monitoring, neuro checks and seizure precautions though suspect seizure due to hypoglycemic episode , rule out infcn, leukocytosis appears baseline

## 2025-02-14 NOTE — PATIENT PROFILE ADULT - FUNCTIONAL ASSESSMENT - BASIC MOBILITY SCORE.
22 Complex Repair And Dermal Autograft Text: The defect edges were debeveled with a #15 scalpel blade.  The primary defect was closed partially with a complex linear closure.  Given the location of the defect, shape of the defect and the proximity to free margins an dermal autograft was deemed most appropriate to repair the remaining defect.  The graft was trimmed to fit the size of the remaining defect.  The graft was then placed in the primary defect, oriented appropriately, and sutured into place.

## 2025-02-14 NOTE — H&P ADULT - NSICDXPASTMEDICALHX_GEN_ALL_CORE_FT
PAST MEDICAL HISTORY:  DM (diabetes mellitus), type 1     ESRD on dialysis     HTN (hypertension)

## 2025-02-14 NOTE — H&P ADULT - NSHPLABSRESULTS_GEN_ALL_CORE
LABS:  cret                        11.0   21.54 )-----------( 345      ( 13 Feb 2025 20:57 )             34.0     02-13    135  |  100  |  33[H]  ----------------------------<  239[H]  4.9   |  27  |  5.57[H]    Ca    7.9[L]      13 Feb 2025 22:08    TPro  5.6[L]  /  Alb  1.8[L]  /  TBili  0.4  /  DBili  x   /  AST  41[H]  /  ALT  13  /  AlkPhos  97  02-13    < from: CT Head No Cont (02.13.25 @ 23:33) >      IMPRESSION:  No acute intracranial hemorrhage, mass effect, or midline shift. If   clinical symptoms persist or worsen, more sensitive evaluation with brain   MRI may be obtained, if no contraindications exist.    < end of copied text >

## 2025-02-14 NOTE — ED PROVIDER NOTE - PHYSICAL EXAMINATION
Gen: aox3, tearful/crying but nodding yes/no   Head: NCAT  ENT: Airway patent, moist mucous membranes, nasal passageways clear, + tongue bites / dried blood in mouth   Cardiac: Normal rate, normal rhythm   Respiratory: Lungs CTA B/L  Gastrointestinal: Abdomen soft, nontender, nondistended, no rebound, no guarding  MSK: No gross abnormalities, FROM of all four extremities, no edema  HEME: Extremities warm and well perfused   Skin: No rashes, no lesions  Neuro: No gross neurologic deficits, no facial asymmetry, moves all extremities

## 2025-02-14 NOTE — ED PROVIDER NOTE - RATE
Ajit Gorman was seen and treated in our emergency department on 10/18/2024.  He may return to work on 10/19/2024.       If you have any questions or concerns, please don't hesitate to call.      Ricarda Gan PA-C
[id number]

## 2025-02-14 NOTE — H&P ADULT - NSHPPHYSICALEXAM_GEN_ALL_CORE
GENERAL: NAD  HEAD:  Atraumatic, normocephalic  EYES: EOMI, PERRL  NECK: Supple, trachea midline, no JVD  HEART: Regular rate and rhythm  LUNGS: Unlabored respirations.  Clear to auscultation bilaterally, no crackles, wheezing, or rhonchi  ABDOMEN: Soft, nontender, nondistended, +BS  EXTREMITIES: 2+ peripheral pulses bilaterally. No clubbing, cyanosis, or edema  NERVOUS SYSTEM:  A&Ox3, moving all extremities, no focal deficits

## 2025-02-14 NOTE — PATIENT PROFILE ADULT - OVER THE PAST TWO WEEKS HAVE YOU FELT DOWN, DEPRESSED OR HOPELESS?
Labs: TSH, CRP, CBC, CMP  Stool testing: Stool cultures, C.diff, ova and parasites (travel), Fecal calprotectin, Fecal elastase    1) Avoid dairy products  2) Selectively avoid gluten for 4 weeks to assess for response  3) Low FODMAP diet - information booklet given   4) Metamucil/Citrucel    5) Increase physical activity   6) Consider food allergy testing   7) Use Imodium as needed - OTC  8) Work towards control of anxiety/stress  9) Daily probiotic-Align    Call me in 2 weeks with   Could try bentyl if diarrhea continues     no

## 2025-02-14 NOTE — H&P ADULT - ASSESSMENT
24-year-old female with past medical history of type 1 diabetes, ESRD on HD MWF, hypertension who presented to the ED after what looked like a near syncopal episode. At the time, blood glucose was found to be 48 at home. Admit to medicine for hypoglycemia.

## 2025-02-14 NOTE — CHART NOTE - NSCHARTNOTEFT_GEN_A_CORE
chart reviewed    24-year-old female    h/o  type 1 diabetes, ESRD on HD MWF, HTN        admitted  with  a near syncopal episode.,  from low blood glucose , was   48 at home.    pt  was admitted  for hypoglycemia.   elevated  wbc   noted.  with fevers       cxr. no pna       on empiric  iv rocephin/  follow bcx     DM, type  1       follow fs/ hco3 is  27       home meds: lantus 10-10 u qday.  does  not have insulin  pump   CKD, on HD          has PermCath chest,   and   new, Left  AVF           renal  dr myles chart reviewed    24-year-old female    h/o  type 1 DM, , ESRD on HD  m/w.f, , HTN        admitted  with  a near syncopal episode.,  from low blood glucose , was   48 at home.    pt  was admitted  for hypoglycemia.   elevated  wbc   noted.  with fevers        cxr. no pna        source  unclear, new  AVF.  distal  left arm, no swelling/  redness       and Permcath, R ant chest  wall,  pt denIES  tenderness       on empiric  iv rocephin/  follow bcx     DM, type  1       follow fs/ hco3 is  27        on lantus/  admelog on priro  viist         pe r pt,   she does  not  use insulin  pump   CKD, on HD          has PermCath chest,   and   new, Left  AVF       pt  asking to  leave.,  discussed  plan of care           renal  dr myles

## 2025-02-14 NOTE — CONSULT NOTE ADULT - SUBJECTIVE AND OBJECTIVE BOX
Patient chart reviewed, full consult to follow.   Will arrange for HD today  Decide on perm cath removal pending BC results and course  Thank you for the courtesy of this consultation. [facility] NEPHROLOGY SERVICES, [facility]  NEPHROLOGY AND HYPERTENSION  [address]  [address]  [place] [address]  [phone]    MD ANTONI JEFFERSON MD YELENA ROSENBERG, MD BINNY KOSHY, MD CHRISTOPHER CAPUTO, MD EDWARD BOVER, MD      Information from chart:  "Patient is a 24y old  Female who presents with a chief complaint of Hypoglycemia (14 Feb 2025 12:53)    HPI:  24-year-old female with past medical history of type 1 diabetes, ESRD on HD MWF, hypertension who presented to the ED after what looked like a near syncopal episode. At the time, blood glucose was found to be 48 at home. The patient does not recall the event. She denies any preceding symptoms such as dizziness, lightheadedness, heart palpitations; denies recent illness, fevers, chills, dysuria, abdominal pain, or any other active complaints. The patient is a poor historian; reluctant to answer questions. She states that she has an insulin pump but also inject subcu lantus between 10-20 u/night. Has an endocrinologist outpatient but does not recall name/address. She denies intentional overdose, suicidal ideation.    On presentation, the patient was hypertensive otherwise stable vital signs. Labs notable for WBC 21.5, hemoglobin 11, creatinine 5.5, COVID/flu/RSV negative. Blood glucose have ranged from 100 to 200s while in the ED. CT of the head was negative for any acute findings. The patient received labetalol iv in the ED.   (14 Feb 2025 00:39)   "  No distress  BP elevated; fever       PAST MEDICAL & SURGICAL HISTORY:  DM (diabetes mellitus), type 1      HTN (hypertension)      ESRD on dialysis        FAMILY HISTORY:    Allergies    No Known Allergies    Intolerances      Home Medications:    MEDICATIONS  (STANDING):  calcitriol   Capsule 0.25 MICROGram(s) Oral daily  cefTRIAXone   IVPB 1000 milliGRAM(s) IV Intermittent every 24 hours  dextrose 5%. 1000 milliLiter(s) (100 mL/Hr) IV Continuous <Continuous>  dextrose 5%. 1000 milliLiter(s) (50 mL/Hr) IV Continuous <Continuous>  dextrose 50% Injectable 25 Gram(s) IV Push once  dextrose 50% Injectable 12.5 Gram(s) IV Push once  dextrose 50% Injectable 25 Gram(s) IV Push once  glucagon  Injectable 1 milliGRAM(s) IntraMuscular once  insulin glargine Injectable (LANTUS) 14 Unit(s) SubCutaneous at bedtime  insulin lispro (ADMELOG) corrective regimen sliding scale   SubCutaneous three times a day before meals  insulin lispro Injectable (ADMELOG) 3 Unit(s) SubCutaneous three times a day before meals    MEDICATIONS  (PRN):  acetaminophen     Tablet .. 650 milliGRAM(s) Oral every 6 hours PRN Temp greater or equal to 38C (100.4F), Mild Pain (1 - 3)  dextrose Oral Gel 15 Gram(s) Oral once PRN Blood Glucose LESS THAN 70 milliGRAM(s)/deciliter    Vital Signs Last 24 Hrs  T(C): 36.9 (14 Feb 2025 09:33), Max: 37.9 (14 Feb 2025 00:55)  T(F): 98.5 (14 Feb 2025 09:33), Max: 100.3 (14 Feb 2025 00:55)  HR: 102 (14 Feb 2025 13:02) (102 - 135)  BP: 146/96 (14 Feb 2025 09:33) (115/91 - 168/115)  BP(mean): --  RR: 16 (14 Feb 2025 09:33) (12 - 19)  SpO2: 98% (14 Feb 2025 09:33) (95% - 100%)    Parameters below as of 14 Feb 2025 09:33  Patient On (Oxygen Delivery Method): room air        Daily Height in cm: 157.48 (13 Feb 2025 19:57)    Daily     CAPILLARY BLOOD GLUCOSE      POCT Blood Glucose.: 355 mg/dL (14 Feb 2025 12:05)  POCT Blood Glucose.: 113 mg/dL (14 Feb 2025 07:49)  POCT Blood Glucose.: 133 mg/dL (14 Feb 2025 05:46)  POCT Blood Glucose.: 185 mg/dL (13 Feb 2025 23:49)  POCT Blood Glucose.: 236 mg/dL (13 Feb 2025 22:34)  POCT Blood Glucose.: 254 mg/dL (13 Feb 2025 21:23)  POCT Blood Glucose.: 155 mg/dL (13 Feb 2025 20:12)    PHYSICAL EXAM:      T(C): 36.9 (02-14-25 @ 09:33), Max: 37.9 (02-14-25 @ 00:55)  HR: 102 (02-14-25 @ 13:02) (102 - 135)  BP: 146/96 (02-14-25 @ 09:33) (115/91 - 168/115)  RR: 16 (02-14-25 @ 09:33) (12 - 19)  SpO2: 98% (02-14-25 @ 09:33) (95% - 100%)  Wt(kg): --  Lungs clear  Heart S1S2  Abd soft NT ND  Extremities:   tr edema              02-14    132[L]  |  96  |  45[H]  ----------------------------<  310[H]  4.2   |  27  |  7.21[H]    Ca    8.3[L]      14 Feb 2025 14:05    TPro  5.6[L]  /  Alb  2.0[L]  /  TBili  0.3  /  DBili  x   /  AST  22  /  ALT  12  /  AlkPhos  104  02-14                          9.7    10.21 )-----------( 365      ( 14 Feb 2025 14:05 )             30.5     Creatinine Trend: 7.21<--, 5.57<--, 7.64<--  Urinalysis Basic - ( 14 Feb 2025 14:05 )    Color: x / Appearance: x / SG: x / pH: x  Gluc: 310 mg/dL / Ketone: x  / Bili: x / Urobili: x   Blood: x / Protein: x / Nitrite: x   Leuk Esterase: x / RBC: x / WBC x   Sq Epi: x / Non Sq Epi: x / Bacteria: x            Assessment   Hypogylcemia, near syncope  HTN urgency     Plan  Will arrange for HD today  Decide on perm cath removal pending BC results and course  CT with IV contrast to assess liver lesions  Follow bladder scan   BP medication adjustments           Selvin Cosme MD        Thank you for the courtesy of this consultation.

## 2025-02-14 NOTE — H&P ADULT - PROBLEM SELECTOR PLAN 1
- BG in the 40s at home   - near syncope/ possible seizure at home  - Home meds: lantus 10-10 u qday, however pat mentions insulin pump(not present on admission)  - start  ISS, POC BG achs  - hypoglycemia protocol in place  - carb control diet  - f/u A1c  - Endocrine consult in am

## 2025-02-15 LAB
AMPHET UR-MCNC: NEGATIVE — SIGNIFICANT CHANGE UP
ANION GAP SERPL CALC-SCNC: 8 MMOL/L — SIGNIFICANT CHANGE UP (ref 5–17)
APPEARANCE UR: CLEAR — SIGNIFICANT CHANGE UP
BACTERIA # UR AUTO: ABNORMAL /HPF
BARBITURATES UR SCN-MCNC: NEGATIVE — SIGNIFICANT CHANGE UP
BENZODIAZ UR-MCNC: NEGATIVE — SIGNIFICANT CHANGE UP
BILIRUB UR-MCNC: NEGATIVE — SIGNIFICANT CHANGE UP
BUN SERPL-MCNC: 36 MG/DL — HIGH (ref 7–23)
CALCIUM SERPL-MCNC: 8 MG/DL — LOW (ref 8.5–10.1)
CHLORIDE SERPL-SCNC: 98 MMOL/L — SIGNIFICANT CHANGE UP (ref 96–108)
CO2 SERPL-SCNC: 25 MMOL/L — SIGNIFICANT CHANGE UP (ref 22–31)
COCAINE METAB.OTHER UR-MCNC: NEGATIVE — SIGNIFICANT CHANGE UP
COLOR SPEC: YELLOW — SIGNIFICANT CHANGE UP
CREAT SERPL-MCNC: 5.65 MG/DL — HIGH (ref 0.5–1.3)
DIFF PNL FLD: ABNORMAL
EGFR: 10 ML/MIN/1.73M2 — LOW
EPI CELLS # UR: PRESENT
GLUCOSE BLDC GLUCOMTR-MCNC: 128 MG/DL — HIGH (ref 70–99)
GLUCOSE BLDC GLUCOMTR-MCNC: 205 MG/DL — HIGH (ref 70–99)
GLUCOSE BLDC GLUCOMTR-MCNC: 273 MG/DL — HIGH (ref 70–99)
GLUCOSE BLDC GLUCOMTR-MCNC: 492 MG/DL — CRITICAL HIGH (ref 70–99)
GLUCOSE BLDC GLUCOMTR-MCNC: 498 MG/DL — CRITICAL HIGH (ref 70–99)
GLUCOSE BLDC GLUCOMTR-MCNC: 68 MG/DL — LOW (ref 70–99)
GLUCOSE SERPL-MCNC: 492 MG/DL — CRITICAL HIGH (ref 70–99)
GLUCOSE UR QL: >=1000 MG/DL
HCT VFR BLD CALC: 28.5 % — LOW (ref 34.5–45)
HGB BLD-MCNC: 9.1 G/DL — LOW (ref 11.5–15.5)
KETONES UR-MCNC: NEGATIVE MG/DL — SIGNIFICANT CHANGE UP
LEUKOCYTE ESTERASE UR-ACNC: NEGATIVE — SIGNIFICANT CHANGE UP
MCHC RBC-ENTMCNC: 29.8 PG — SIGNIFICANT CHANGE UP (ref 27–34)
MCHC RBC-ENTMCNC: 31.9 G/DL — LOW (ref 32–36)
MCV RBC AUTO: 93.4 FL — SIGNIFICANT CHANGE UP (ref 80–100)
METHADONE UR-MCNC: NEGATIVE — SIGNIFICANT CHANGE UP
NITRITE UR-MCNC: NEGATIVE — SIGNIFICANT CHANGE UP
NRBC BLD AUTO-RTO: 0 /100 WBCS — SIGNIFICANT CHANGE UP (ref 0–0)
OPIATES UR-MCNC: NEGATIVE — SIGNIFICANT CHANGE UP
PCP SPEC-MCNC: SIGNIFICANT CHANGE UP
PCP UR-MCNC: NEGATIVE — SIGNIFICANT CHANGE UP
PH UR: 8 — SIGNIFICANT CHANGE UP (ref 5–8)
PLATELET # BLD AUTO: 329 K/UL — SIGNIFICANT CHANGE UP (ref 150–400)
POTASSIUM SERPL-MCNC: 4.1 MMOL/L — SIGNIFICANT CHANGE UP (ref 3.5–5.3)
POTASSIUM SERPL-SCNC: 4.1 MMOL/L — SIGNIFICANT CHANGE UP (ref 3.5–5.3)
PROT UR-MCNC: >=1000 MG/DL
RBC # BLD: 3.05 M/UL — LOW (ref 3.8–5.2)
RBC # FLD: 14.6 % — HIGH (ref 10.3–14.5)
RBC CASTS # UR COMP ASSIST: 15 /HPF — HIGH (ref 0–4)
SODIUM SERPL-SCNC: 131 MMOL/L — LOW (ref 135–145)
SP GR SPEC: 1.02 — SIGNIFICANT CHANGE UP (ref 1–1.03)
THC UR QL: NEGATIVE — SIGNIFICANT CHANGE UP
UROBILINOGEN FLD QL: 0.2 MG/DL — SIGNIFICANT CHANGE UP (ref 0.2–1)
WBC # BLD: 8.98 K/UL — SIGNIFICANT CHANGE UP (ref 3.8–10.5)
WBC # FLD AUTO: 8.98 K/UL — SIGNIFICANT CHANGE UP (ref 3.8–10.5)
WBC UR QL: 2 /HPF — SIGNIFICANT CHANGE UP (ref 0–5)

## 2025-02-15 PROCEDURE — 99233 SBSQ HOSP IP/OBS HIGH 50: CPT

## 2025-02-15 RX ORDER — POLYETHYLENE GLYCOL 3350 17 G/17G
17 POWDER, FOR SOLUTION ORAL DAILY
Refills: 0 | Status: DISCONTINUED | OUTPATIENT
Start: 2025-02-15 | End: 2025-02-16

## 2025-02-15 RX ORDER — SENNA 187 MG
2 TABLET ORAL AT BEDTIME
Refills: 0 | Status: DISCONTINUED | OUTPATIENT
Start: 2025-02-15 | End: 2025-02-16

## 2025-02-15 RX ADMIN — INSULIN GLARGINE-YFGN 14 UNIT(S): 100 INJECTION, SOLUTION SUBCUTANEOUS at 21:42

## 2025-02-15 RX ADMIN — INSULIN LISPRO 6: 100 INJECTION, SOLUTION INTRAVENOUS; SUBCUTANEOUS at 08:58

## 2025-02-15 RX ADMIN — INSULIN LISPRO 3 UNIT(S): 100 INJECTION, SOLUTION INTRAVENOUS; SUBCUTANEOUS at 08:57

## 2025-02-15 RX ADMIN — INSULIN LISPRO 2: 100 INJECTION, SOLUTION INTRAVENOUS; SUBCUTANEOUS at 12:25

## 2025-02-15 RX ADMIN — INSULIN LISPRO 3 UNIT(S): 100 INJECTION, SOLUTION INTRAVENOUS; SUBCUTANEOUS at 12:25

## 2025-02-15 RX ADMIN — Medication 2 TABLET(S): at 21:43

## 2025-02-15 RX ADMIN — CEFTRIAXONE 100 MILLIGRAM(S): 500 INJECTION, POWDER, FOR SOLUTION INTRAMUSCULAR; INTRAVENOUS at 21:54

## 2025-02-15 RX ADMIN — AMLODIPINE BESYLATE 5 MILLIGRAM(S): 10 TABLET ORAL at 05:20

## 2025-02-15 RX ADMIN — CALCITRIOL 0.25 MICROGRAM(S): 0.5 CAPSULE, GELATIN COATED ORAL at 12:25

## 2025-02-15 NOTE — PROVIDER CONTACT NOTE (HYPOGLYCEMIA EVENT) - NS PROVIDER CONTACT BACKGROUND-HYPO
Age: 24y    Gender: Female    POCT Blood Glucose:  128 mg/dL (02-15-25 @ 17:13)  68 mg/dL (02-15-25 @ 16:18)  205 mg/dL (02-15-25 @ 11:31)  492 mg/dL (02-15-25 @ 08:12)  498 mg/dL (02-15-25 @ 08:11)  299 mg/dL (02-14-25 @ 20:48)  257 mg/dL (02-14-25 @ 18:44)      eMAR:  insulin glargine Injectable (LANTUS)   14 Unit(s) SubCutaneous (02-14-25 @ 21:21)    insulin lispro (ADMELOG) corrective regimen sliding scale   2 Unit(s) SubCutaneous (02-15-25 @ 12:25)   6 Unit(s) SubCutaneous (02-15-25 @ 08:58)   3 Unit(s) SubCutaneous (02-14-25 @ 18:48)    insulin lispro Injectable (ADMELOG)   3 Unit(s) SubCutaneous (02-15-25 @ 12:25)   3 Unit(s) SubCutaneous (02-15-25 @ 08:57)   3 Unit(s) SubCutaneous (02-14-25 @ 18:47)

## 2025-02-15 NOTE — PROGRESS NOTE ADULT - ASSESSMENT
24-year-old female with past medical history of type 1 diabetes, ESRD on HD MWF, hypertension who presented to the ED after what looked like a near syncopal episode. At the time, blood glucose was found to be 48 at home. Admit to medicine for hypoglycemia.       Hypoglycemia. states she may have taken too much insulin or may have taken insulin without eating a meal but does not remember    - BG in the 40s at home   - near syncope/ possible seizure at home  - Home meds: lantus 10-10 u qday, however  mentions insulin pump(not present on admission)  -holding premeal lispro for now   continue with lantus and ISS  avoid hypoglycemia   - carb control diet  flu/covid/rsv neg   follow Blood Cx, UA , U Tox         Diabetes mellitus type 1.   A1c 8.3% (1/2025)   ·  Plan: as above.     ESRD on dialysis.   ·  Plan: - Cr 5.5 on admission(baseline around  5-6)  - HD on M-W-F  - Pt minimally urinates  - access: PermCath chest placed 1/2025. , L av fistula not yet matured   - HD as per nephro.     HTN (hypertension).   ·  Plan: - Hypertensive on presentation   continue with amlodipine     Preventative Measures   ·  Plan: DVT ppx: ambulate at [facility]   general precautions

## 2025-02-15 NOTE — PROGRESS NOTE ADULT - TIME BILLING
min spent reviewing patient's chart,  discussing in IDR, examining patient, discussing plan with patient and family and staff, reviewing consultant recommendations/communicating with consultants, writing progress note and placing orders.

## 2025-02-15 NOTE — PROGRESS NOTE ADULT - SUBJECTIVE AND OBJECTIVE BOX
[facility] NEPHROLOGY SERVICES, [facility]  NEPHROLOGY AND HYPERTENSION  [address]  [address]  [place] [address]  [phone]    MD ANTONI JEFFERSON MD YELENA ROSENBERG, MD BINNY KOSHY, MD CHRISTOPHER CAPUTO, MD EDWARD BOVER, MD          Patient events noted    MEDICATIONS  (STANDING):  amLODIPine   Tablet 5 milliGRAM(s) Oral daily  calcitriol   Capsule 0.25 MICROGram(s) Oral daily  cefTRIAXone   IVPB 1000 milliGRAM(s) IV Intermittent every 24 hours  dextrose 5%. 1000 milliLiter(s) (100 mL/Hr) IV Continuous <Continuous>  dextrose 5%. 1000 milliLiter(s) (50 mL/Hr) IV Continuous <Continuous>  dextrose 50% Injectable 25 Gram(s) IV Push once  dextrose 50% Injectable 12.5 Gram(s) IV Push once  dextrose 50% Injectable 25 Gram(s) IV Push once  glucagon  Injectable 1 milliGRAM(s) IntraMuscular once  insulin glargine Injectable (LANTUS) 14 Unit(s) SubCutaneous at bedtime  insulin lispro (ADMELOG) corrective regimen sliding scale   SubCutaneous three times a day before meals  polyethylene glycol 3350 17 Gram(s) Oral daily  senna 2 Tablet(s) Oral at bedtime    MEDICATIONS  (PRN):  acetaminophen     Tablet .. 650 milliGRAM(s) Oral every 6 hours PRN Temp greater or equal to 38C (100.4F), Mild Pain (1 - 3)  dextrose Oral Gel 15 Gram(s) Oral once PRN Blood Glucose LESS THAN 70 milliGRAM(s)/deciliter  hydrALAZINE Injectable 5 milliGRAM(s) IV Push every 6 hours PRN if SBP >160 or DBP >100      02-14-25 @ 07:01  -  02-15-25 @ 07:00  --------------------------------------------------------  IN: 0 mL / OUT: 2000 mL / NET: -2000 mL      PHYSICAL EXAM:      T(C): 36.9 (02-15-25 @ 21:26), Max: 37.2 (02-14-25 @ 23:20)  HR: 109 (02-15-25 @ 21:26) (95 - 109)  BP: 151/98 (02-15-25 @ 21:26) (128/82 - 151/102)  RR: 18 (02-15-25 @ 21:26) (18 - 100)  SpO2: 100% (02-15-25 @ 21:26) (100% - 100%)  Wt(kg): --  Lungs clear  Heart S1S2  Abd soft NT ND  Extremities:   tr edema                                    9.1    8.98  )-----------( 329      ( 15 Feb 2025 07:58 )             28.5     02-15    131[L]  |  98  |  36[H]  ----------------------------<  492[HH]  4.1   |  25  |  5.65[H]    Ca    8.0[L]      15 Feb 2025 07:58    TPro  5.6[L]  /  Alb  2.0[L]  /  TBili  0.3  /  DBili  x   /  AST  22  /  ALT  12  /  AlkPhos  104  02-14      LIVER FUNCTIONS - ( 14 Feb 2025 14:05 )  Alb: 2.0 g/dL / Pro: 5.6 gm/dL / ALK PHOS: 104 U/L / ALT: 12 U/L / AST: 22 U/L / GGT: x           Creatinine Trend: 5.65<--, 7.21<--, 5.57<--, 7.64<--          Assessment   Hypogylcemia, near syncope  HTN urgency     Plan    Decide on perm cath removal pending BC results and course  CT with IV contrast to assess liver lesions  Follow bladder scan   BP medication adjustments               Selvin Cosme MD
PROGRESS NOTE:     Patient is a 24y old  Female who presents with a chief complaint of Hypoglycemia (2025 12:53)        SUBJECTIVE & OBJECTIVE:   Pt seen and examined at bedside in AM    no overnight events.   no new complaints    REVIEW OF SYSTEMS: remaining ROS negative     PHYSICAL EXAM:  T(C): 36.8 (02-15-25 @ 16:48), Max: 37.2 (25 @ 23:20)  HR: 105 (02-15-25 @ 16:48) (95 - 105)  BP: 151/102 (02-15-25 @ 16:48) (128/82 - 151/102)  RR: 18 (02-15-25 @ 16:48) (18 - 100)  SpO2: 100% (02-15-25 @ 16:48) (100% - 100%)  Wt(kg): --       GENERAL: NAD,  no increased WOB  HEAD:  Atraumatic, Normocephalic  EYES: EOMI, PERRLA, conjunctiva and sclera clear  ENMT: Moist mucous membranes  NECK: Supple, No JVD  NERVOUS SYSTEM:  Alert & Oriented X3, no focal neuro deficits   CHEST/LUNG: Clear to auscultation bilaterally; No rales, rhonchi, wheezing, or rubs  HEART: Regular rate and rhythm; No murmurs, rubs, or gallops  ABDOMEN: Soft, Nontender, Nondistended; Bowel sounds present  EXTREMITIES:  2+ Peripheral Pulses b/l, No clubbing, cyanosis, calf tenderness or edema b/l      LABS:                        9.1    8.98  )-----------( 329      ( 15 Feb 2025 07:58 )             28.5     -15    131[L]  |  98  |  36[H]  ----------------------------<  492[HH]  4.1   |  25  |  5.65[H]    Ca    8.0[L]      15 Feb 2025 07:58    TPro  5.6[L]  /  Alb  2.0[L]  /  TBili  0.3  /  DBili  x   /  AST  22  /  ALT  12  /  AlkPhos  104  02-14      Urinalysis Basic - ( 15 Feb 2025 14:00 )    Color: Yellow / Appearance: Clear / S.018 / pH: x  Gluc: x / Ketone: Negative mg/dL  / Bili: Negative / Urobili: 0.2 mg/dL   Blood: x / Protein: >=1000 mg/dL / Nitrite: Negative   Leuk Esterase: Negative / RBC: 15 /HPF / WBC 2 /HPF   Sq Epi: x / Non Sq Epi: x / Bacteria: Moderate /HPF                RECENT CULTURES:          RADIOLOGY & ADDITIONAL TESTS:          Radiology reports read and imaging reviewed  :  [ x] YES  [ ] NO  (I am not a radiologist and therefore rely on Radiologist reports to facilitate with diagnosis and treatment plans)    Consultant(s) Notes Reviewed:  [x ] YES  [ ] NO    Care Discussed with Consultants/Other Providers [x ] YES  [ ] NO  Care plan and all findings were discussed in detail with patient.  All questions and concerns addressed

## 2025-02-16 VITALS
DIASTOLIC BLOOD PRESSURE: 86 MMHG | OXYGEN SATURATION: 100 % | HEART RATE: 99 BPM | TEMPERATURE: 98 F | SYSTOLIC BLOOD PRESSURE: 131 MMHG | RESPIRATION RATE: 19 BRPM

## 2025-02-16 LAB
FERRITIN SERPL-MCNC: 1060 NG/ML — HIGH (ref 15–150)
FOLATE SERPL-MCNC: 10.8 NG/ML — SIGNIFICANT CHANGE UP
GLUCOSE BLDC GLUCOMTR-MCNC: 161 MG/DL — HIGH (ref 70–99)
GLUCOSE BLDC GLUCOMTR-MCNC: 174 MG/DL — HIGH (ref 70–99)
IRON SATN MFR SERPL: 43 % — SIGNIFICANT CHANGE UP (ref 14–50)
IRON SATN MFR SERPL: 70 UG/DL — SIGNIFICANT CHANGE UP (ref 30–160)
TIBC SERPL-MCNC: 163 UG/DL — LOW (ref 220–430)
TSH SERPL-MCNC: 3.29 UU/ML — SIGNIFICANT CHANGE UP (ref 0.36–3.74)
UIBC SERPL-MCNC: 93 UG/DL — LOW (ref 110–370)
VIT B12 SERPL-MCNC: 805 PG/ML — SIGNIFICANT CHANGE UP (ref 232–1245)

## 2025-02-16 PROCEDURE — 99239 HOSP IP/OBS DSCHRG MGMT >30: CPT

## 2025-02-16 RX ORDER — SENNA 187 MG
2 TABLET ORAL
Qty: 0 | Refills: 0 | DISCHARGE
Start: 2025-02-16

## 2025-02-16 RX ORDER — CEFTRIAXONE 500 MG/1
1000 INJECTION, POWDER, FOR SOLUTION INTRAMUSCULAR; INTRAVENOUS ONCE
Refills: 0 | Status: COMPLETED | OUTPATIENT
Start: 2025-02-16 | End: 2025-02-16

## 2025-02-16 RX ORDER — AMLODIPINE BESYLATE 10 MG/1
1 TABLET ORAL
Qty: 30 | Refills: 0
Start: 2025-02-16 | End: 2025-03-17

## 2025-02-16 RX ADMIN — CALCITRIOL 0.25 MICROGRAM(S): 0.5 CAPSULE, GELATIN COATED ORAL at 11:46

## 2025-02-16 RX ADMIN — INSULIN LISPRO 1: 100 INJECTION, SOLUTION INTRAVENOUS; SUBCUTANEOUS at 08:35

## 2025-02-16 RX ADMIN — AMLODIPINE BESYLATE 5 MILLIGRAM(S): 10 TABLET ORAL at 05:03

## 2025-02-16 RX ADMIN — CEFTRIAXONE 100 MILLIGRAM(S): 500 INJECTION, POWDER, FOR SOLUTION INTRAMUSCULAR; INTRAVENOUS at 11:04

## 2025-02-16 RX ADMIN — INSULIN LISPRO 1: 100 INJECTION, SOLUTION INTRAVENOUS; SUBCUTANEOUS at 11:46

## 2025-02-16 NOTE — DISCHARGE NOTE PROVIDER - NSDCMRMEDTOKEN_GEN_ALL_CORE_FT
amLODIPine 5 mg oral tablet: 1 tab(s) orally once a day  calcitriol 0.25 mcg oral capsule: 1 cap(s) orally once a day  Lantus Solostar Pen 100 units/mL subcutaneous solution: 10 unit(s) subcutaneous once a day (at bedtime) 10 unit(s) subcutaneous once a day  senna leaf extract oral tablet: 2 tab(s) orally once a day (at bedtime)

## 2025-02-16 NOTE — DISCHARGE NOTE PROVIDER - CARE PROVIDER_API CALL
Selvin Cosme  Nephrology  [address], [address] [place] [id number]  Phone: [phone]  Fax: [phone]  Follow Up Time: 1 week    your primary care doctor,   Phone: (   )    -  Fax: (   )    -  Follow Up Time: 1 week

## 2025-02-16 NOTE — DISCHARGE NOTE NURSING/CASE MANAGEMENT/SOCIAL WORK - NSDCFUADDAPPT_GEN_ALL_CORE_FT
It is important to see your primary physician as well as other necessary consultants within the next week to perform a comprehensive medical review.  Call their offices for an appointment as soon as you leave the hospital.  If you do not have a primary physician or cant reach him/her, contact the [facility] Physician Referral Service at [phone].  Your medical issues appear to be stable at this time, but if your symptoms recur or worsen, contact your physicians and/or return to the hospital if necessary.  If you encounter any issues or questions with your medication, call your physicians before stopping the medication.

## 2025-02-16 NOTE — DISCHARGE NOTE PROVIDER - NSDCHOSPICE_GEN_A_CORE
1. Have you been to the ER, urgent care clinic since your last visit? Hospitalized since your last visit? No    2. Have you seen or consulted any other health care providers outside of the [address] [place] [address] since your last visit? Include any pap smears or colon screening.  No     Chief Complaint   Patient presents with    Cold Symptoms     coughing, sore throat, running nose, aching ears x3 days No

## 2025-02-16 NOTE — DISCHARGE NOTE PROVIDER - NSDCFUADDAPPT_GEN_ALL_CORE_FT
It is important to see your primary physician as well as other necessary consultants within the next week to perform a comprehensive medical review.  Call their offices for an appointment as soon as you leave the hospital.  If you do not have a primary physician or cant reach him/her, contact the [facility] Physician Referral Service at [phone].  Your medical issues appear to be stable at this time, but if your symptoms recur or worsen, contact your physicians and/or return to the hospital if necessary.  If you encounter any issues or questions with your medication, call your physicians before stopping the medication.

## 2025-02-16 NOTE — DISCHARGE NOTE PROVIDER - PROVIDER TOKENS
PROVIDER:[TOKEN:[[id number]],FOLLOWUP:[1 week]],FREE:[LAST:[your primary care doctor],PHONE:[(   )    -],FAX:[(   )    -],FOLLOWUP:[1 week]]

## 2025-02-16 NOTE — DISCHARGE NOTE PROVIDER - HOSPITAL COURSE
24-year-old female with past medical history of type 1 diabetes, ESRD on HD MWF, hypertension who presented to the ED after what looked like a near syncopal episode. At the time, blood glucose was found to be 48 at home. Admit to medicine for hypoglycemia.    Vital Signs Last 24 Hrs  T(C): 36.7 (16 Feb 2025 04:38), Max: 37 (15 Feb 2025 11:01)  T(F): 98 (16 Feb 2025 04:38), Max: 98.6 (15 Feb 2025 11:01)  HR: 109 (16 Feb 2025 04:38) (96 - 109)  BP: 126/89 (16 Feb 2025 04:38) (126/89 - 151/102)  RR: 18 (16 Feb 2025 04:38) (18 - 18)  SpO2: 99% (16 Feb 2025 04:38) (99% - 100%)  Parameters below as of 15 Feb 2025 21:26  Patient On (Oxygen Delivery Method): room air  GENERAL: NAD , no increased WOB, nontoxic appearing   NERVOUS SYSTEM:  Alert & Oriented X3, Good concentration; nonfocal   CHEST/LUNG: CTAB;  No rales, rhonchi, wheezing, or rubs  HEART: Regular rate and rhythm; No murmurs, rubs, or gallops  ABDOMEN: Soft, Nontender, Nondistended; Bowel sounds present  EXTREMITIES:  2+ Peripheral Pulses b/l, No clubbing, cyanosis, calf tenderness or edema b/l   permacath site clean dressing c/d/i   left AVF site well heeled           possibly UTI, possibly neuropathy from DM1>>treated with ceftriaxone 1g x 3 doses      Hypoglycemia. possibly due to above   states she may have taken too much insulin or may have taken insulin without eating a meal but does not remember   doubt seizure    - BG in the 40s at home   - near syncope , due to hypoglycemia   - Home meds: lantus 10-10 u qday, however  mentions insulin pump(not present on admission)  continue with sliding scale at home per carb count which she states she does   education on not missing meals at length and to avoid short acting insulin if she does not eat, long acting insulin should still be given half dose if she is not eating.   avoid hypoglycemia   flu/covid/rsv neg   Blood Cx --NGTD   ETOH neg   utox neg   CT head no sig findings   CXR clear   UA dirty, patient denies any urinary complaints   bladder scan PVR <50cc   hypoglycemia resolved   she is eating without issues   leukocytosis resolved, quickly , likely reactive due to hypoglycemia   she remained afebrile. nontoxic appearing   education on return parameters discussed at length, including but not limited to fever, chills, lethargy, hypoglycemia , weakness . etc Discussed to call 911 or come to any closest ER. she expressed understanding and agreeable.         Diabetes mellitus type 1.   A1c 8.3% (1/2025)     as above.  resume insulin pump as outpatient   endo follow-up outpatient      ESRD on dialysis.   ·  Plan: - Cr 5.5 on admission(baseline around  5-6)  - HD on M-W-F  - Pt minimally urinates  - access: PermCath chest placed 1/2025. , L av fistula not yet matured   - HD as per nephro.     HTN (hypertension).   ·  Plan: - Hypertensive on presentation   continue with amlodipine     Liver lesions, refusing in patient MRI liver protocol despite encouragement   wants to follow-up as outpatient   she is aware of findings   hep panel neg   3.6 cm liver lesion which cannot be further assessed on CT without IV   contrast. The second liver lesion seen on ultrasound is not demonstrated   on this exam. Recommend MRI with hepatic mass protocol/with and without   intravenous contrast.      Discharge time : 40 min   RETURN PARAMETERS DISCUSSED WITH PATIENT, PATIENT EXPRESSED UNDERSTANDING AND IS AGREEABLE. DISCUSSED WITH PATIENT ON REFRAINING FROM DRIVING UNTIL FOLLOW-UP/ CLEARED BY PMD. PATIENT EXPRESSED UNDERSTANDING.   Care plan and all findings were discussed in detail with patient.  All questions and concerns addressed

## 2025-02-16 NOTE — DISCHARGE NOTE NURSING/CASE MANAGEMENT/SOCIAL WORK - PATIENT PORTAL LINK FT
You can access the FollowMyHealth Patient Portal offered by [facility] by registering at the following website: [email or web address]. By joining [employer]’s FollowMyHealth portal, you will also be able to view your health information using other applications (apps) compatible with our system.

## 2025-02-16 NOTE — DISCHARGE NOTE PROVIDER - NSDCCPCAREPLAN_GEN_ALL_CORE_FT
PRINCIPAL DISCHARGE DIAGNOSIS  Diagnosis: Hypoglycemia  Assessment and Plan of Treatment:       SECONDARY DISCHARGE DIAGNOSES  Diagnosis: ESRD on dialysis  Assessment and Plan of Treatment:     Diagnosis: HTN (hypertension)  Assessment and Plan of Treatment:     Diagnosis: Diabetes mellitus type 1  Assessment and Plan of Treatment:     Diagnosis: Liver lesion  Assessment and Plan of Treatment: you will need outpatient MRI with liver protocol

## 2025-02-17 LAB
CULTURE RESULTS: SIGNIFICANT CHANGE UP
SPECIMEN SOURCE: SIGNIFICANT CHANGE UP

## 2025-02-19 LAB
CULTURE RESULTS: SIGNIFICANT CHANGE UP
SPECIMEN SOURCE: SIGNIFICANT CHANGE UP

## 2025-02-20 DIAGNOSIS — E10.22 TYPE 1 DIABETES MELLITUS WITH DIABETIC CHRONIC KIDNEY DISEASE: ICD-10-CM

## 2025-02-20 DIAGNOSIS — R55 SYNCOPE AND COLLAPSE: ICD-10-CM

## 2025-02-20 DIAGNOSIS — E10.649 TYPE 1 DIABETES MELLITUS WITH HYPOGLYCEMIA WITHOUT COMA: ICD-10-CM

## 2025-02-20 DIAGNOSIS — N39.0 URINARY TRACT INFECTION, SITE NOT SPECIFIED: ICD-10-CM

## 2025-02-20 DIAGNOSIS — Z99.2 DEPENDENCE ON RENAL DIALYSIS: ICD-10-CM

## 2025-02-20 DIAGNOSIS — E10.40 TYPE 1 DIABETES MELLITUS WITH DIABETIC NEUROPATHY, UNSPECIFIED: ICD-10-CM

## 2025-02-20 DIAGNOSIS — N18.6 END STAGE RENAL DISEASE: ICD-10-CM

## 2025-02-25 ENCOUNTER — NON-APPOINTMENT (OUTPATIENT)
Age: 25
End: 2025-02-25

## 2025-04-08 PROBLEM — N18.6 END STAGE RENAL DISEASE: Chronic | Status: ACTIVE | Noted: 2025-02-14

## 2025-04-09 ENCOUNTER — APPOINTMENT (OUTPATIENT)
Dept: VASCULAR SURGERY | Facility: CLINIC | Age: 25
End: 2025-04-09

## 2025-04-09 ENCOUNTER — EMERGENCY (EMERGENCY)
Facility: HOSPITAL | Age: 25
LOS: 0 days | Discharge: HOME | End: 2025-04-09
Attending: STUDENT IN AN ORGANIZED HEALTH CARE EDUCATION/TRAINING PROGRAM
Payer: MEDICAID

## 2025-04-09 VITALS
DIASTOLIC BLOOD PRESSURE: 140 MMHG | RESPIRATION RATE: 20 BRPM | SYSTOLIC BLOOD PRESSURE: 212 MMHG | OXYGEN SATURATION: 100 % | HEART RATE: 108 BPM | WEIGHT: 138.01 LBS | TEMPERATURE: 98 F | HEIGHT: 62 IN

## 2025-04-09 VITALS
DIASTOLIC BLOOD PRESSURE: 133 MMHG | SYSTOLIC BLOOD PRESSURE: 184 MMHG | RESPIRATION RATE: 17 BRPM | OXYGEN SATURATION: 100 % | HEART RATE: 114 BPM

## 2025-04-09 LAB
APTT BLD: 30.1 SEC — SIGNIFICANT CHANGE UP (ref 24.5–35.6)
BASOPHILS # BLD AUTO: 0.02 K/UL — SIGNIFICANT CHANGE UP (ref 0–0.2)
BASOPHILS NFR BLD AUTO: 0.3 % — SIGNIFICANT CHANGE UP (ref 0–2)
EOSINOPHIL # BLD AUTO: 0.03 K/UL — SIGNIFICANT CHANGE UP (ref 0–0.5)
EOSINOPHIL NFR BLD AUTO: 0.5 % — SIGNIFICANT CHANGE UP (ref 0–6)
HCT VFR BLD CALC: 34.1 % — LOW (ref 34.5–45)
HGB BLD-MCNC: 11 G/DL — LOW (ref 11.5–15.5)
IMM GRANULOCYTES NFR BLD AUTO: 0.5 % — SIGNIFICANT CHANGE UP (ref 0–0.9)
INR BLD: 0.9 RATIO — SIGNIFICANT CHANGE UP (ref 0.85–1.16)
LYMPHOCYTES # BLD AUTO: 0.71 K/UL — LOW (ref 1–3.3)
LYMPHOCYTES # BLD AUTO: 12.3 % — LOW (ref 13–44)
MCHC RBC-ENTMCNC: 29.2 PG — SIGNIFICANT CHANGE UP (ref 27–34)
MCHC RBC-ENTMCNC: 32.3 G/DL — SIGNIFICANT CHANGE UP (ref 32–36)
MCV RBC AUTO: 90.5 FL — SIGNIFICANT CHANGE UP (ref 80–100)
MONOCYTES # BLD AUTO: 0.39 K/UL — SIGNIFICANT CHANGE UP (ref 0–0.9)
MONOCYTES NFR BLD AUTO: 6.8 % — SIGNIFICANT CHANGE UP (ref 2–14)
NEUTROPHILS # BLD AUTO: 4.59 K/UL — SIGNIFICANT CHANGE UP (ref 1.8–7.4)
NEUTROPHILS NFR BLD AUTO: 79.6 % — HIGH (ref 43–77)
NRBC BLD AUTO-RTO: 0 /100 WBCS — SIGNIFICANT CHANGE UP (ref 0–0)
PLATELET # BLD AUTO: 258 K/UL — SIGNIFICANT CHANGE UP (ref 150–400)
PROTHROM AB SERPL-ACNC: 10.4 SEC — SIGNIFICANT CHANGE UP (ref 9.9–13.4)
RBC # BLD: 3.77 M/UL — LOW (ref 3.8–5.2)
RBC # FLD: 14 % — SIGNIFICANT CHANGE UP (ref 10.3–14.5)
WBC # BLD: 5.77 K/UL — SIGNIFICANT CHANGE UP (ref 3.8–10.5)
WBC # FLD AUTO: 5.77 K/UL — SIGNIFICANT CHANGE UP (ref 3.8–10.5)

## 2025-04-09 PROCEDURE — 93010 ELECTROCARDIOGRAM REPORT: CPT

## 2025-04-09 PROCEDURE — 99285 EMERGENCY DEPT VISIT HI MDM: CPT

## 2025-04-09 PROCEDURE — 71045 X-RAY EXAM CHEST 1 VIEW: CPT | Mod: 26

## 2025-04-09 NOTE — ED PROVIDER NOTE - PHYSICAL EXAMINATION
GEN: Awake, alert, interactive, NAD.  HEAD AND NECK: NC/AT. Airway patent. Neck supple.   EYES:  Clear b/l. EOMI. PERRL.   ENT: Moist mucus membranes.   CARDIAC: Regular rate, regular rhythm. No evident pedal edema.   RESP/CHEST: Normal respiratory effort with no use of accessory muscles or retractions. Clear throughout on auscultation. + R upper chest HD catheter.  ABD: Soft, non-distended, non-tender. No rebound, no guarding.   BACK: No midline spinal TTP. No CVAT.   EXTREMITIES: Moving all extremities with no apparent deformities.   SKIN: Warm, dry, intact normal color. No rash.   NEURO: AOx3, CN II-XII grossly intact, no focal deficits.   PSYCH: Appropriate mood and affect.

## 2025-04-09 NOTE — ED ADULT TRIAGE NOTE - CHIEF COMPLAINT QUOTE
Patient BIB EMS post AMS at home with BS 60, repeat post D10 250ml  by EMS. Patient alert and awake at triage. PMx: ESRD, DM I, LMP 2 weeks ago

## 2025-04-09 NOTE — ED PROVIDER NOTE - CLINICAL SUMMARY MEDICAL DECISION MAKING FREE TEXT BOX
NOTES FOR VISIT:   Â· Discussed with patient using ketoconazole cream and Cortisone cream underneath her breasts and under the pannus fold of her abdomen. Prescription for this was sent to the pharmacy. She's uses intermittently as needed for his discomfort. Â· She can continue to use the ketoconazole cream to her feet for an intermittent athlete's foot. Â· Refill was given for daily ketoconazole shampoo to use on her scalp along with the clobetasol solution. Â· She can apply the clobetasol to her right eye well. Follow-up in 3 months. IMAGES:  No images are attached to the encounter. FOLLOW-UP: Return in about 3 months (around 7/8/2019). Josie Gonzalez was seen today for derm problem. Diagnoses and all orders for this visit:    Alopecia areata    Dermatitis-of feet  -     ketoconazole (NIZORAL) 2 % cream; Mix with equal amount of hydrocortisone cream, apply bid under breast as needed    Intertrigo    Other orders  -     hydroCORTisone (CORTIZONE) 2.5 % cream; Apply to skin fold irritation twice daily with 2% ketoconazole cream until clear, then as needed. -     clobetasol (TEMOVATE) 0.05 % topical solution; Apply daily as directed         Medications Discontinued During This Encounter   Medication Reason   â¢ ketoconazole (NIZORAL) 2 % cream Reorder   â¢ clobetasol (TEMOVATE) 0.05 % topical solution Reorder        Chief Complaint   Patient presents with   â¢ Derm Problem     Skin Check / Hairloss of Eyebrow       HISTORY: Josie Gonzalez is here today for:  Â· Presents for routine refill on medications. She is requesting a skin check as well. She has a long history of alopecia and dermatitis of the feet. She is looking for refills on ketoconazole cream which she uses on her feet intermittently and clobetasol solution she is on her scalp.     PAST DERMATOLOGY-SPECIFIC HISTORY:  Alopecia  Dermatitis of the feet    ACTIVE DERMATOLOGY CONDITIONS AND PRESCRIPTIONS:  N/A    PAST MEDICAL HISTORY, PAST SURGICAL HISTORY, SOCIAL HISTORY, AND FAMILY HISTORY WERE REVIEWED. PHYSICAL EXAMINATION: CONSTITUTIONAL:  Krista Jimenez  is a 28year old female who is well developed, well nourished, in no acute distress. There were no vitals taken for this visit. [facility] NEUROLOGIC AND PSYCHIATRIC: She has a normal mood and affect. SKIN: Complete examination, including palpation and careful visual examination of the area of interest revealed no other significant abnormality or eruption. I noted:    Â· Moves easily about the room. Appears approximately her stated age. She wear glasses. She is previous history of bipolar disease. Â· Examination reveals a faint rash underneath the breast and sub pannus fold. There is no obvious rash on her feet today. Â· Face, ears, neck, back, chest, abdomen, buttocks, legs, feet, arms and hands were inspected today for suspicious lesions. She is a few benign-appearing nevus is on her torso. Â· Examination of her face reveals a patch of alopecia in the middle of her right eyebrow. Examination of the outer portions of her scalp reveals alopecia there is some fine hair regrowth and some shortness hair fibers on the scalp.     Jennifer Jerome, APNP, NP, DCNP 24F PMH HTN, DM, ESRD on HD (MWF, on HD x4mo, chest port, scheduled for 3P today) BIBEMS d/t episode AMS, hypoglycemia PTA. Per mother, pt agitated / crying / swinging her arms around this AM, gave pt juice w/ improvement, FS 60. Pt w/o memory of episode. Pt currently AAOx4, asymptomatic. Pt states w/ normal PO intake, insulin administration. Pt admits w/ more frequent episodes of hypoglycemia s/p starting HD in January. Afebrile, VSS other than + markedly elevated BP. Pt overall well-appearing, in NAD. Exam as noted PE. Plan for CBC, CMP, HCG, coags, UA/C, CXR, ECG. Monitor BGL. Re-eval. Dichter: 24F PMH HTN, DM, ESRD on HD (MWF, on HD x4mo, chest port, scheduled for 3P today) BIBEMS d/t episode AMS, hypoglycemia PTA. Per mother, pt agitated / crying / swinging her arms around this AM, gave pt juice w/ improvement, FS 60. Pt w/o memory of episode. Pt currently AAOx4, asymptomatic. Pt states w/ normal PO intake, insulin administration. Pt admits w/ more frequent episodes of hypoglycemia s/p starting HD in January. Afebrile, VSS other than + markedly elevated BP. Pt overall well-appearing, in NAD. Exam as noted PE. Plan for CBC, CMP, HCG, coags, UA/C, CXR, ECG. Monitor BGL. Re-eval.

## 2025-04-09 NOTE — ED PROVIDER NOTE - PROGRESS NOTE DETAILS
Patient care endorsed by Dr. Travis. Patient brought to ER for transient AMS related to hypoglycemia, has long since resolved.  Patient was pending EKG, UA, CMP, hcg however says that she no longer makes urine and has had lab error with her CMP and hcg 3 times.  She is declining to complete any additional studies or repeat labs and says that she feels 100% and wants to go, does not want to give blood to check potassium.  EKG with normal intervals, no concern for electrolyte related arrhythmia.  Due for dialysis in am.  The patient has decided to leave against medical advice.  The patient is AAOx3, not intoxicated, and displays normal decision making ability. We discussed all risks, benefits, and alternatives to the progression of treatment and the potential dangers of leaving including but not limited to permanent disability, injury, and death.  The patient was instructed that they are welcome to change their decision to leave against medical advice and return to the emergency department at any time and for any reason in order to allow us to render care.

## 2025-04-09 NOTE — ED PROVIDER NOTE - PATIENT PORTAL LINK FT
You can access the FollowMyHealth Patient Portal offered by [facility] by registering at the following website: [email or web address]. By joining [employer]’s FollowMyHealth portal, you will also be able to view your health information using other applications (apps) compatible with our system.

## 2025-04-09 NOTE — ED PROVIDER NOTE - ATTENDING APP SHARED VISIT CONTRIBUTION OF CARE
Dichter: Pt seen w/ PA, 24F PMH HTN, DM, ESRD on HD (MWF, on HD x4mo, chest port, scheduled for 3P today) BIBEMS d/t episode AMS, hypoglycemia PTA. Per mother, pt agitated / crying / swinging her arms around this AM, gave pt juice w/ improvement, FS 60. Pt w/o memory of episode. Pt currently AAOx4, asymptomatic. Pt states w/ normal PO intake, insulin administration. Pt admits w/ more frequent episodes of hypoglycemia s/p starting HD in January. Afebrile, VSS other than + markedly elevated BP. Pt overall well-appearing, in NAD. Exam as noted PE. Agree w/ planned w/u and dispo.

## 2025-04-09 NOTE — ED ADULT NURSE NOTE - OBJECTIVE STATEMENT
pt aox3 states glucose at home in the low 60's, mother gave 2 glucose tabs. BIBEMS pt arrived awake and talking in complete sentences. noticed bp elevated. pt is scheduled for dialysis today. pt has port on upper right chest. ivr in R hand. pt talking with no acute distress. pt denies any pain. glucose reassess on arrival, see results.

## 2025-07-05 ENCOUNTER — INPATIENT (INPATIENT)
Facility: HOSPITAL | Age: 25
LOS: 3 days | Discharge: HOME | End: 2025-07-09
Attending: STUDENT IN AN ORGANIZED HEALTH CARE EDUCATION/TRAINING PROGRAM | Admitting: STUDENT IN AN ORGANIZED HEALTH CARE EDUCATION/TRAINING PROGRAM
Payer: COMMERCIAL

## 2025-07-05 VITALS
DIASTOLIC BLOOD PRESSURE: 103 MMHG | SYSTOLIC BLOOD PRESSURE: 203 MMHG | HEART RATE: 118 BPM | RESPIRATION RATE: 19 BRPM | TEMPERATURE: 99 F | OXYGEN SATURATION: 99 %

## 2025-07-05 LAB
ALBUMIN SERPL ELPH-MCNC: 2.9 G/DL — LOW (ref 3.3–5)
ALP SERPL-CCNC: 166 U/L — HIGH (ref 40–120)
ALT FLD-CCNC: 28 U/L — SIGNIFICANT CHANGE UP (ref 12–78)
ANION GAP SERPL CALC-SCNC: 16 MMOL/L — SIGNIFICANT CHANGE UP (ref 5–17)
APTT BLD: 25.5 SEC — LOW (ref 26.1–36.8)
AST SERPL-CCNC: 39 U/L — HIGH (ref 15–37)
BASE EXCESS BLDV CALC-SCNC: 5.1 MMOL/L — HIGH (ref -2–3)
BASOPHILS # BLD AUTO: 0.02 K/UL — SIGNIFICANT CHANGE UP (ref 0–0.2)
BASOPHILS NFR BLD AUTO: 0.2 % — SIGNIFICANT CHANGE UP (ref 0–2)
BILIRUB SERPL-MCNC: 0.5 MG/DL — SIGNIFICANT CHANGE UP (ref 0.2–1.2)
BLOOD GAS COMMENTS, VENOUS: SIGNIFICANT CHANGE UP
BUN SERPL-MCNC: 46 MG/DL — HIGH (ref 7–23)
CALCIUM SERPL-MCNC: 8.1 MG/DL — LOW (ref 8.5–10.1)
CHLORIDE BLDV-SCNC: 89 MMOL/L — LOW (ref 98–107)
CHLORIDE SERPL-SCNC: 90 MMOL/L — LOW (ref 96–108)
CO2 BLDV-SCNC: 28 MMOL/L — HIGH (ref 22–26)
CO2 SERPL-SCNC: 24 MMOL/L — SIGNIFICANT CHANGE UP (ref 22–31)
CREAT SERPL-MCNC: 6.97 MG/DL — HIGH (ref 0.5–1.3)
EGFR: 8 ML/MIN/1.73M2 — LOW
EGFR: 8 ML/MIN/1.73M2 — LOW
EOSINOPHIL # BLD AUTO: 0.02 K/UL — SIGNIFICANT CHANGE UP (ref 0–0.5)
EOSINOPHIL NFR BLD AUTO: 0.2 % — SIGNIFICANT CHANGE UP (ref 0–6)
FLUAV AG NPH QL: SIGNIFICANT CHANGE UP
FLUBV AG NPH QL: SIGNIFICANT CHANGE UP
GAS PNL BLDV: 127 MMOL/L — LOW (ref 136–145)
GAS PNL BLDV: SIGNIFICANT CHANGE UP
GAS PNL BLDV: SIGNIFICANT CHANGE UP
GLUCOSE BLDV-MCNC: >500 MG/DL — CRITICAL HIGH (ref 65–95)
GLUCOSE SERPL-MCNC: 468 MG/DL — CRITICAL HIGH (ref 70–99)
HCG SERPL-ACNC: <1 MIU/ML — SIGNIFICANT CHANGE UP
HCO3 BLDV-SCNC: 27 MMOL/L — SIGNIFICANT CHANGE UP (ref 22–28)
HCT VFR BLD CALC: 34.9 % — SIGNIFICANT CHANGE UP (ref 34.5–45)
HCT VFR BLDA CALC: 38 % — SIGNIFICANT CHANGE UP (ref 37–47)
HGB BLD CALC-MCNC: 12.7 G/DL — SIGNIFICANT CHANGE UP (ref 11.7–16.1)
HGB BLD-MCNC: 11.5 G/DL — SIGNIFICANT CHANGE UP (ref 11.5–15.5)
IMM GRANULOCYTES NFR BLD AUTO: 0.3 % — SIGNIFICANT CHANGE UP (ref 0–0.9)
INR BLD: 0.93 RATIO — SIGNIFICANT CHANGE UP (ref 0.85–1.16)
LACTATE BLDV-MCNC: 1.9 MMOL/L — HIGH (ref 0.56–1.39)
LACTATE SERPL-SCNC: 1.7 MMOL/L — SIGNIFICANT CHANGE UP (ref 0.7–2)
LIDOCAIN IGE QN: 54 U/L — SIGNIFICANT CHANGE UP (ref 13–75)
LYMPHOCYTES # BLD AUTO: 0.67 K/UL — LOW (ref 1–3.3)
LYMPHOCYTES # BLD AUTO: 7.4 % — LOW (ref 13–44)
MAGNESIUM SERPL-MCNC: 2.5 MG/DL — SIGNIFICANT CHANGE UP (ref 1.6–2.6)
MCHC RBC-ENTMCNC: 30.1 PG — SIGNIFICANT CHANGE UP (ref 27–34)
MCHC RBC-ENTMCNC: 33 G/DL — SIGNIFICANT CHANGE UP (ref 32–36)
MCV RBC AUTO: 91.4 FL — SIGNIFICANT CHANGE UP (ref 80–100)
MONOCYTES # BLD AUTO: 0.74 K/UL — SIGNIFICANT CHANGE UP (ref 0–0.9)
MONOCYTES NFR BLD AUTO: 8.2 % — SIGNIFICANT CHANGE UP (ref 2–14)
NEUTROPHILS # BLD AUTO: 7.57 K/UL — HIGH (ref 1.8–7.4)
NEUTROPHILS NFR BLD AUTO: 83.7 % — HIGH (ref 43–77)
NRBC BLD AUTO-RTO: 0 /100 WBCS — SIGNIFICANT CHANGE UP (ref 0–0)
PCO2 BLDV: 29 MMHG — LOW (ref 42–55)
PH BLDV: 7.57 — HIGH (ref 7.32–7.43)
PHOSPHATE SERPL-MCNC: 2.7 MG/DL — SIGNIFICANT CHANGE UP (ref 2.5–4.5)
PLATELET # BLD AUTO: 306 K/UL — SIGNIFICANT CHANGE UP (ref 150–400)
PO2 BLDV: 44 MMHG — SIGNIFICANT CHANGE UP (ref 25–45)
POTASSIUM BLDV-SCNC: 4.5 MMOL/L — SIGNIFICANT CHANGE UP (ref 3.5–5.1)
POTASSIUM SERPL-MCNC: 4.4 MMOL/L — SIGNIFICANT CHANGE UP (ref 3.5–5.3)
POTASSIUM SERPL-SCNC: 4.4 MMOL/L — SIGNIFICANT CHANGE UP (ref 3.5–5.3)
PROT SERPL-MCNC: 7.7 GM/DL — SIGNIFICANT CHANGE UP (ref 6–8.3)
PROTHROM AB SERPL-ACNC: 10.8 SEC — SIGNIFICANT CHANGE UP (ref 9.9–13.4)
RBC # BLD: 3.82 M/UL — SIGNIFICANT CHANGE UP (ref 3.8–5.2)
RBC # FLD: 15.8 % — HIGH (ref 10.3–14.5)
RSV RNA NPH QL NAA+NON-PROBE: SIGNIFICANT CHANGE UP
SAO2 % BLDV: 78.7 % — LOW (ref 94–98)
SARS-COV-2 RNA SPEC QL NAA+PROBE: SIGNIFICANT CHANGE UP
SODIUM SERPL-SCNC: 130 MMOL/L — LOW (ref 135–145)
SOURCE RESPIRATORY: SIGNIFICANT CHANGE UP
WBC # BLD: 9.05 K/UL — SIGNIFICANT CHANGE UP (ref 3.8–10.5)
WBC # FLD AUTO: 9.05 K/UL — SIGNIFICANT CHANGE UP (ref 3.8–10.5)

## 2025-07-05 PROCEDURE — 93010 ELECTROCARDIOGRAM REPORT: CPT

## 2025-07-05 PROCEDURE — 71045 X-RAY EXAM CHEST 1 VIEW: CPT | Mod: 26

## 2025-07-05 PROCEDURE — 71275 CT ANGIOGRAPHY CHEST: CPT | Mod: 26

## 2025-07-05 PROCEDURE — 99285 EMERGENCY DEPT VISIT HI MDM: CPT

## 2025-07-05 PROCEDURE — 74177 CT ABD & PELVIS W/CONTRAST: CPT | Mod: 26

## 2025-07-05 RX ORDER — CEFTRIAXONE 500 MG/1
1000 INJECTION, POWDER, FOR SOLUTION INTRAMUSCULAR; INTRAVENOUS ONCE
Refills: 0 | Status: COMPLETED | OUTPATIENT
Start: 2025-07-05 | End: 2025-07-05

## 2025-07-05 RX ORDER — SODIUM CHLORIDE 9 G/1000ML
2000 INJECTION, SOLUTION INTRAVENOUS ONCE
Refills: 0 | Status: COMPLETED | OUTPATIENT
Start: 2025-07-05 | End: 2025-07-05

## 2025-07-05 RX ORDER — ONDANSETRON HCL/PF 4 MG/2 ML
4 VIAL (ML) INJECTION ONCE
Refills: 0 | Status: COMPLETED | OUTPATIENT
Start: 2025-07-05 | End: 2025-07-05

## 2025-07-05 RX ORDER — LABETALOL HYDROCHLORIDE 200 MG/1
10 TABLET, FILM COATED ORAL ONCE
Refills: 0 | Status: COMPLETED | OUTPATIENT
Start: 2025-07-05 | End: 2025-07-05

## 2025-07-05 RX ORDER — INSULIN GLARGINE-YFGN 100 [IU]/ML
10 INJECTION, SOLUTION SUBCUTANEOUS ONCE
Refills: 0 | Status: COMPLETED | OUTPATIENT
Start: 2025-07-05 | End: 2025-07-05

## 2025-07-05 RX ORDER — CEFTRIAXONE 500 MG/1
1000 INJECTION, POWDER, FOR SOLUTION INTRAMUSCULAR; INTRAVENOUS ONCE
Refills: 0 | Status: DISCONTINUED | OUTPATIENT
Start: 2025-07-05 | End: 2025-07-05

## 2025-07-05 RX ORDER — INSULIN LISPRO 100 U/ML
5 INJECTION, SOLUTION INTRAVENOUS; SUBCUTANEOUS ONCE
Refills: 0 | Status: COMPLETED | OUTPATIENT
Start: 2025-07-05 | End: 2025-07-05

## 2025-07-05 RX ADMIN — INSULIN GLARGINE-YFGN 10 UNIT(S): 100 INJECTION, SOLUTION SUBCUTANEOUS at 23:00

## 2025-07-05 RX ADMIN — LABETALOL HYDROCHLORIDE 10 MILLIGRAM(S): 200 TABLET, FILM COATED ORAL at 20:46

## 2025-07-05 RX ADMIN — SODIUM CHLORIDE 2000 MILLILITER(S): 9 INJECTION, SOLUTION INTRAVENOUS at 19:17

## 2025-07-05 RX ADMIN — Medication 2 MILLIGRAM(S): at 19:13

## 2025-07-05 RX ADMIN — CEFTRIAXONE 1000 MILLIGRAM(S): 500 INJECTION, POWDER, FOR SOLUTION INTRAMUSCULAR; INTRAVENOUS at 19:16

## 2025-07-05 RX ADMIN — Medication 2 MILLIGRAM(S): at 20:15

## 2025-07-05 RX ADMIN — INSULIN LISPRO 5 UNIT(S): 100 INJECTION, SOLUTION INTRAVENOUS; SUBCUTANEOUS at 22:58

## 2025-07-05 RX ADMIN — Medication 5 UNIT(S): at 20:14

## 2025-07-05 RX ADMIN — Medication 20 MILLIGRAM(S): at 20:15

## 2025-07-05 RX ADMIN — Medication 4 MILLIGRAM(S): at 19:13

## 2025-07-05 NOTE — ED ADULT TRIAGE NOTE - CHIEF COMPLAINT QUOTE
pt c/o abdominal pain, nausea and vomiting since yesterday. also c/o  elevated bp as well.  history of ESRD on dialysis. last session was yesterday. history of type 1 dm. bp at triage 203/120 and fs 438.

## 2025-07-05 NOTE — ED ADULT NURSE NOTE - OBJECTIVE STATEMENT
24 yr old female AOx4. C/o N/V starting yesterday. 9/10 generalized abdominal pain. Pt is hypertensive and hyperglycemic. Hx of T1DM, HTN,  and ESRD. Right chest permacath with dialysis MWF. Pt denies CP/SOB

## 2025-07-05 NOTE — ED ADULT NURSE NOTE - NSFALLASSESSNEED_ED_ALL_ED
Assessment completed. Pt c/o pain 7/10, meds given, see MAR. Respirations are even & easy. No complaints voiced. Pt denies needs at this time. SR up x 2, and bed in low position. Call light is within reach.    Bedside Mobility Assessment Tool (BMAT):     Assessment Level 1- Sit and Shake    1. From a semi-reclined position, ask patient to sit up and rotate to a seated position at the side of the bed. Can use the bedrail.    2. Ask patient to reach out and grab your hand and shake making sure patient reaches across his/her midline.   Pass- Patient is able to come to a seated position, maintain core strength. Maintains seated balance while reaching across midline. Move on to Assessment Level 2.     Assessment Level 2- Stretch and Point   1. With patient in seated position at the side of the bed, have patient place both feet on the floor (or stool) with knees no higher than hips.    2. Ask patient to stretch one leg and straighten the knee, then bend the ankle/flex and point the toes. If appropriate, repeat with the other leg.   Pass- Patient is able to demonstrate appropriate quad strength on intended weight bearing limb(s). Move onto Assessment Level 3.     Assessment Level 3- Stand   1. Ask patient to elevate off the bed or chair (seated to standing) using an assistive device (cane, bedrail).    2. Patient should be able to raise buttocks off be and hold for a count of five. May repeat once.   Pass- Patient maintains standing stability for at least 5 seconds, proceed to assessment level 4.    Assessment Level 4- Walk   1. Ask patient to march in place at bedside.    2. Then ask patient to advance step and return each foot. Some medical conditions may render a patient from stepping backwards, use your best clinical judgement.   Pass- Patient demonstrates balance while shifting weight and ability to step, takes independent steps, does not use assistive device patient is MOBILITY LEVEL 4.      Mobility Level- 4    no

## 2025-07-06 DIAGNOSIS — E11.65 TYPE 2 DIABETES MELLITUS WITH HYPERGLYCEMIA: ICD-10-CM

## 2025-07-06 LAB
ALBUMIN SERPL ELPH-MCNC: 2.4 G/DL — LOW (ref 3.3–5)
ALP SERPL-CCNC: 127 U/L — HIGH (ref 40–120)
ALT FLD-CCNC: 24 U/L — SIGNIFICANT CHANGE UP (ref 12–78)
ANION GAP SERPL CALC-SCNC: 12 MMOL/L — SIGNIFICANT CHANGE UP (ref 5–17)
ANION GAP SERPL CALC-SCNC: 15 MMOL/L — SIGNIFICANT CHANGE UP (ref 5–17)
AST SERPL-CCNC: 21 U/L — SIGNIFICANT CHANGE UP (ref 15–37)
B-OH-BUTYR SERPL-SCNC: 0.6 MMOL/L — HIGH
BASE EXCESS BLDV CALC-SCNC: 3.2 MMOL/L — HIGH (ref -2–3)
BASOPHILS # BLD AUTO: 0.02 K/UL — SIGNIFICANT CHANGE UP (ref 0–0.2)
BASOPHILS NFR BLD AUTO: 0.2 % — SIGNIFICANT CHANGE UP (ref 0–2)
BILIRUB SERPL-MCNC: 0.3 MG/DL — SIGNIFICANT CHANGE UP (ref 0.2–1.2)
BLOOD GAS COMMENTS, VENOUS: SIGNIFICANT CHANGE UP
BUN SERPL-MCNC: 51 MG/DL — HIGH (ref 7–23)
BUN SERPL-MCNC: 52 MG/DL — HIGH (ref 7–23)
CALCIUM SERPL-MCNC: 7.8 MG/DL — LOW (ref 8.5–10.1)
CALCIUM SERPL-MCNC: 8 MG/DL — LOW (ref 8.5–10.1)
CHLORIDE BLDV-SCNC: 89 MMOL/L — LOW (ref 98–107)
CHLORIDE SERPL-SCNC: 90 MMOL/L — LOW (ref 96–108)
CHLORIDE SERPL-SCNC: 92 MMOL/L — LOW (ref 96–108)
CO2 BLDV-SCNC: 30 MMOL/L — HIGH (ref 22–26)
CO2 SERPL-SCNC: 24 MMOL/L — SIGNIFICANT CHANGE UP (ref 22–31)
CO2 SERPL-SCNC: 29 MMOL/L — SIGNIFICANT CHANGE UP (ref 22–31)
CREAT SERPL-MCNC: 7.41 MG/DL — HIGH (ref 0.5–1.3)
CREAT SERPL-MCNC: 7.81 MG/DL — HIGH (ref 0.5–1.3)
EGFR: 7 ML/MIN/1.73M2 — LOW
EOSINOPHIL # BLD AUTO: 0.02 K/UL — SIGNIFICANT CHANGE UP (ref 0–0.5)
EOSINOPHIL NFR BLD AUTO: 0.2 % — SIGNIFICANT CHANGE UP (ref 0–6)
GAS PNL BLDV: 126 MMOL/L — LOW (ref 136–145)
GAS PNL BLDV: SIGNIFICANT CHANGE UP
GAS PNL BLDV: SIGNIFICANT CHANGE UP
GLUCOSE BLDC GLUCOMTR-MCNC: 135 MG/DL — HIGH (ref 70–99)
GLUCOSE BLDC GLUCOMTR-MCNC: 160 MG/DL — HIGH (ref 70–99)
GLUCOSE BLDC GLUCOMTR-MCNC: 176 MG/DL — HIGH (ref 70–99)
GLUCOSE BLDC GLUCOMTR-MCNC: 255 MG/DL — HIGH (ref 70–99)
GLUCOSE BLDV-MCNC: 483 MG/DL — CRITICAL HIGH (ref 65–95)
GLUCOSE SERPL-MCNC: 273 MG/DL — HIGH (ref 70–99)
GLUCOSE SERPL-MCNC: 413 MG/DL — HIGH (ref 70–99)
HCO3 BLDV-SCNC: 28 MMOL/L — SIGNIFICANT CHANGE UP (ref 22–28)
HCT VFR BLD CALC: 29.8 % — LOW (ref 34.5–45)
HCT VFR BLDA CALC: 31 % — LOW (ref 37–47)
HGB BLD CALC-MCNC: 10.2 G/DL — LOW (ref 11.7–16.1)
HGB BLD-MCNC: 9.7 G/DL — LOW (ref 11.5–15.5)
HOROWITZ INDEX BLDV+IHG-RTO: 21 — SIGNIFICANT CHANGE UP
IMM GRANULOCYTES NFR BLD AUTO: 0.3 % — SIGNIFICANT CHANGE UP (ref 0–0.9)
LACTATE BLDV-MCNC: 3.7 MMOL/L — HIGH (ref 0.56–1.39)
LYMPHOCYTES # BLD AUTO: 1.55 K/UL — SIGNIFICANT CHANGE UP (ref 1–3.3)
LYMPHOCYTES # BLD AUTO: 16.6 % — SIGNIFICANT CHANGE UP (ref 13–44)
MCHC RBC-ENTMCNC: 30.6 PG — SIGNIFICANT CHANGE UP (ref 27–34)
MCHC RBC-ENTMCNC: 32.6 G/DL — SIGNIFICANT CHANGE UP (ref 32–36)
MCV RBC AUTO: 94 FL — SIGNIFICANT CHANGE UP (ref 80–100)
MONOCYTES # BLD AUTO: 0.8 K/UL — SIGNIFICANT CHANGE UP (ref 0–0.9)
MONOCYTES NFR BLD AUTO: 8.6 % — SIGNIFICANT CHANGE UP (ref 2–14)
NEUTROPHILS # BLD AUTO: 6.9 K/UL — SIGNIFICANT CHANGE UP (ref 1.8–7.4)
NEUTROPHILS NFR BLD AUTO: 74.1 % — SIGNIFICANT CHANGE UP (ref 43–77)
NRBC BLD AUTO-RTO: 0 /100 WBCS — SIGNIFICANT CHANGE UP (ref 0–0)
PCO2 BLDV: 46 MMHG — SIGNIFICANT CHANGE UP (ref 42–55)
PH BLDV: 7.4 — SIGNIFICANT CHANGE UP (ref 7.32–7.43)
PLATELET # BLD AUTO: 261 K/UL — SIGNIFICANT CHANGE UP (ref 150–400)
PO2 BLDV: 51 MMHG — HIGH (ref 25–45)
POTASSIUM BLDV-SCNC: 4.8 MMOL/L — SIGNIFICANT CHANGE UP (ref 3.5–5.1)
POTASSIUM SERPL-MCNC: 4.1 MMOL/L — SIGNIFICANT CHANGE UP (ref 3.5–5.3)
POTASSIUM SERPL-MCNC: 4.5 MMOL/L — SIGNIFICANT CHANGE UP (ref 3.5–5.3)
POTASSIUM SERPL-SCNC: 4.1 MMOL/L — SIGNIFICANT CHANGE UP (ref 3.5–5.3)
POTASSIUM SERPL-SCNC: 4.5 MMOL/L — SIGNIFICANT CHANGE UP (ref 3.5–5.3)
PROT SERPL-MCNC: 6.2 GM/DL — SIGNIFICANT CHANGE UP (ref 6–8.3)
RBC # BLD: 3.17 M/UL — LOW (ref 3.8–5.2)
RBC # FLD: 16.2 % — HIGH (ref 10.3–14.5)
SAO2 % BLDV: 81 % — LOW (ref 94–98)
SODIUM SERPL-SCNC: 129 MMOL/L — LOW (ref 135–145)
SODIUM SERPL-SCNC: 133 MMOL/L — LOW (ref 135–145)
WBC # BLD: 9.32 K/UL — SIGNIFICANT CHANGE UP (ref 3.8–10.5)
WBC # FLD AUTO: 9.32 K/UL — SIGNIFICANT CHANGE UP (ref 3.8–10.5)

## 2025-07-06 PROCEDURE — 99222 1ST HOSP IP/OBS MODERATE 55: CPT

## 2025-07-06 RX ORDER — SODIUM CHLORIDE 9 G/1000ML
1000 INJECTION, SOLUTION INTRAVENOUS
Refills: 0 | Status: DISCONTINUED | OUTPATIENT
Start: 2025-07-06 | End: 2025-07-06

## 2025-07-06 RX ORDER — INSULIN LISPRO 100 U/ML
INJECTION, SOLUTION INTRAVENOUS; SUBCUTANEOUS
Refills: 0 | Status: DISCONTINUED | OUTPATIENT
Start: 2025-07-06 | End: 2025-07-07

## 2025-07-06 RX ORDER — CEFTRIAXONE 500 MG/1
1000 INJECTION, POWDER, FOR SOLUTION INTRAMUSCULAR; INTRAVENOUS EVERY 24 HOURS
Refills: 0 | Status: DISCONTINUED | OUTPATIENT
Start: 2025-07-06 | End: 2025-07-08

## 2025-07-06 RX ORDER — LABETALOL HYDROCHLORIDE 200 MG/1
10 TABLET, FILM COATED ORAL ONCE
Refills: 0 | Status: COMPLETED | OUTPATIENT
Start: 2025-07-06 | End: 2025-07-06

## 2025-07-06 RX ORDER — MAGNESIUM, ALUMINUM HYDROXIDE 200-200 MG
30 TABLET,CHEWABLE ORAL EVERY 4 HOURS
Refills: 0 | Status: DISCONTINUED | OUTPATIENT
Start: 2025-07-06 | End: 2025-07-06

## 2025-07-06 RX ORDER — INSULIN GLARGINE-YFGN 100 [IU]/ML
12 INJECTION, SOLUTION SUBCUTANEOUS AT BEDTIME
Refills: 0 | Status: DISCONTINUED | OUTPATIENT
Start: 2025-07-06 | End: 2025-07-07

## 2025-07-06 RX ORDER — ACETAMINOPHEN 500 MG/5ML
650 LIQUID (ML) ORAL EVERY 6 HOURS
Refills: 0 | Status: DISCONTINUED | OUTPATIENT
Start: 2025-07-06 | End: 2025-07-09

## 2025-07-06 RX ORDER — NIFEDIPINE 30 MG
90 TABLET, EXTENDED RELEASE 24 HR ORAL DAILY
Refills: 0 | Status: DISCONTINUED | OUTPATIENT
Start: 2025-07-07 | End: 2025-07-07

## 2025-07-06 RX ORDER — DOXYCYCLINE HYCLATE 100 MG
100 TABLET ORAL EVERY 12 HOURS
Refills: 0 | Status: DISCONTINUED | OUTPATIENT
Start: 2025-07-06 | End: 2025-07-06

## 2025-07-06 RX ORDER — DEXTROSE 50 % IN WATER 50 %
25 SYRINGE (ML) INTRAVENOUS ONCE
Refills: 0 | Status: DISCONTINUED | OUTPATIENT
Start: 2025-07-06 | End: 2025-07-07

## 2025-07-06 RX ORDER — CALCITRIOL 0.5 UG/1
0.25 CAPSULE, GELATIN COATED ORAL DAILY
Refills: 0 | Status: DISCONTINUED | OUTPATIENT
Start: 2025-07-06 | End: 2025-07-09

## 2025-07-06 RX ORDER — METOPROLOL SUCCINATE 50 MG/1
25 TABLET, EXTENDED RELEASE ORAL DAILY
Refills: 0 | Status: DISCONTINUED | OUTPATIENT
Start: 2025-07-06 | End: 2025-07-07

## 2025-07-06 RX ORDER — MELATONIN 5 MG
3 TABLET ORAL AT BEDTIME
Refills: 0 | Status: DISCONTINUED | OUTPATIENT
Start: 2025-07-06 | End: 2025-07-06

## 2025-07-06 RX ORDER — INSULIN LISPRO 100 U/ML
4 INJECTION, SOLUTION INTRAVENOUS; SUBCUTANEOUS
Refills: 0 | Status: DISCONTINUED | OUTPATIENT
Start: 2025-07-06 | End: 2025-07-07

## 2025-07-06 RX ORDER — SODIUM CHLORIDE 9 G/1000ML
1000 INJECTION, SOLUTION INTRAVENOUS
Refills: 0 | Status: DISCONTINUED | OUTPATIENT
Start: 2025-07-06 | End: 2025-07-07

## 2025-07-06 RX ORDER — NIFEDIPINE 30 MG
90 TABLET, EXTENDED RELEASE 24 HR ORAL ONCE
Refills: 0 | Status: COMPLETED | OUTPATIENT
Start: 2025-07-06 | End: 2025-07-06

## 2025-07-06 RX ORDER — GLUCAGON 3 MG/1
1 POWDER NASAL ONCE
Refills: 0 | Status: DISCONTINUED | OUTPATIENT
Start: 2025-07-06 | End: 2025-07-09

## 2025-07-06 RX ORDER — AMLODIPINE BESYLATE 10 MG/1
5 TABLET ORAL DAILY
Refills: 0 | Status: DISCONTINUED | OUTPATIENT
Start: 2025-07-06 | End: 2025-07-06

## 2025-07-06 RX ORDER — ONDANSETRON HCL/PF 4 MG/2 ML
4 VIAL (ML) INJECTION EVERY 8 HOURS
Refills: 0 | Status: DISCONTINUED | OUTPATIENT
Start: 2025-07-06 | End: 2025-07-07

## 2025-07-06 RX ORDER — INSULIN LISPRO 100 U/ML
INJECTION, SOLUTION INTRAVENOUS; SUBCUTANEOUS
Refills: 0 | Status: DISCONTINUED | OUTPATIENT
Start: 2025-07-06 | End: 2025-07-06

## 2025-07-06 RX ORDER — NIFEDIPINE 30 MG
90 TABLET, EXTENDED RELEASE 24 HR ORAL DAILY
Refills: 0 | Status: DISCONTINUED | OUTPATIENT
Start: 2025-07-06 | End: 2025-07-06

## 2025-07-06 RX ORDER — DEXTROSE 50 % IN WATER 50 %
15 SYRINGE (ML) INTRAVENOUS ONCE
Refills: 0 | Status: DISCONTINUED | OUTPATIENT
Start: 2025-07-06 | End: 2025-07-07

## 2025-07-06 RX ORDER — DEXTROSE 50 % IN WATER 50 %
12.5 SYRINGE (ML) INTRAVENOUS ONCE
Refills: 0 | Status: DISCONTINUED | OUTPATIENT
Start: 2025-07-06 | End: 2025-07-07

## 2025-07-06 RX ORDER — SENNA 187 MG
2 TABLET ORAL AT BEDTIME
Refills: 0 | Status: DISCONTINUED | OUTPATIENT
Start: 2025-07-06 | End: 2025-07-09

## 2025-07-06 RX ADMIN — Medication 30 MILLILITER(S): at 09:44

## 2025-07-06 RX ADMIN — CALCITRIOL 0.25 MICROGRAM(S): 0.5 CAPSULE, GELATIN COATED ORAL at 11:14

## 2025-07-06 RX ADMIN — INSULIN GLARGINE-YFGN 12 UNIT(S): 100 INJECTION, SOLUTION SUBCUTANEOUS at 21:35

## 2025-07-06 RX ADMIN — Medication 20 MILLIGRAM(S): at 12:16

## 2025-07-06 RX ADMIN — INSULIN LISPRO 1: 100 INJECTION, SOLUTION INTRAVENOUS; SUBCUTANEOUS at 13:07

## 2025-07-06 RX ADMIN — Medication 10 MILLIGRAM(S): at 12:18

## 2025-07-06 RX ADMIN — INSULIN LISPRO 4 UNIT(S): 100 INJECTION, SOLUTION INTRAVENOUS; SUBCUTANEOUS at 13:07

## 2025-07-06 RX ADMIN — Medication 110 MILLIGRAM(S): at 05:27

## 2025-07-06 RX ADMIN — LABETALOL HYDROCHLORIDE 10 MILLIGRAM(S): 200 TABLET, FILM COATED ORAL at 01:40

## 2025-07-06 RX ADMIN — INSULIN LISPRO 1: 100 INJECTION, SOLUTION INTRAVENOUS; SUBCUTANEOUS at 08:36

## 2025-07-06 RX ADMIN — Medication 90 MILLIGRAM(S): at 11:14

## 2025-07-06 RX ADMIN — CEFTRIAXONE 100 MILLIGRAM(S): 500 INJECTION, POWDER, FOR SOLUTION INTRAMUSCULAR; INTRAVENOUS at 21:54

## 2025-07-06 RX ADMIN — Medication 4 MILLIGRAM(S): at 11:15

## 2025-07-06 RX ADMIN — INSULIN LISPRO 3: 100 INJECTION, SOLUTION INTRAVENOUS; SUBCUTANEOUS at 17:34

## 2025-07-06 RX ADMIN — INSULIN LISPRO 4 UNIT(S): 100 INJECTION, SOLUTION INTRAVENOUS; SUBCUTANEOUS at 17:34

## 2025-07-06 RX ADMIN — Medication 2 TABLET(S): at 21:35

## 2025-07-06 RX ADMIN — AMLODIPINE BESYLATE 5 MILLIGRAM(S): 10 TABLET ORAL at 09:43

## 2025-07-06 NOTE — ED ADULT NURSE REASSESSMENT NOTE - NS ED NURSE REASSESS COMMENT FT1
Patient's BP elevated, complaining of nausea. Dr. Schultz notified, patient is now complaining of chest pain. Dr. Schultz was called, was not able to tell Dr. Schultz was having chest pain, talked over RN and hung up. LEOLA Ruiz was notified, EKG sent to Dr. Schultz and LEOLA Ruiz
BP is 164/122, LEOLA Ruiz notified, denies any chest pain, sob, headache, dizziness, N/V.

## 2025-07-06 NOTE — ED PROVIDER NOTE - CLINICAL SUMMARY MEDICAL DECISION MAKING FREE TEXT BOX
HPI and PMH as above  Patient with significant PMH of ESRD and T1DM here for nausea, vomiting and abdominal pain. Broad differential including sepsis, CHF exacerbation, electrolyte abnormalities, DKA. Will give IVF and initiate workup. Patient signed out pending remainder of workup and disposition.

## 2025-07-06 NOTE — H&P ADULT - HISTORY OF PRESENT ILLNESS
24 -year-old female    h/o  type 1 DM, , ESRD on HD  m/w.f, , HTN      AVF.  distal  left arm,,   Permcath, R ant chest  wall         c/o   epigastric pain, n/v    for 1 day.       takes  Lantus  , prior history of hypoglycemic episodes.      per  e r note, pt was  hypoxic  on   4 to 5 L   n/c,   abdomen nontender without reproducible pain.      CTA   no pe,  groundglass opacities,     er, had   recvd    receive 1 L of fluid     renal informed  by er

## 2025-07-06 NOTE — CONSULT NOTE ADULT - SUBJECTIVE AND OBJECTIVE BOX
[facility] NEPHROLOGY SERVICES, [facility]  NEPHROLOGY AND HYPERTENSION  [address]  [address]  [place] [address]  [phone]    MD ANTONI JEFFERSON MD YELENA ROSENBERG, MD BINNY KOSHY, MD CHRISTOPHER CAPUTO, MD EDWARD BOVER, MD      Information from chart:  "Patient is a 24y old  Female who presents with a chief complaint of abd pain (06 Jul 2025 10:13)    HPI:    24 -year-old female    h/o  type 1 DM, , ESRD on HD  m/w.f, , HTN      AVF.  distal  left arm,,   Permcath, R ant chest  wall         c/o   epigastric pain, n/v    for 1 day.       takes  Lantus  , prior history of hypoglycemic episodes.      per  e r note, pt was  hypoxic  on   4 to 5 L   n/c,   abdomen nontender without reproducible pain.      CTA   no pe,  groundglass opacities,     er, had   recvd    receive 1 L of fluid     renal informed  by er     (06 Jul 2025 10:13)   "      Currently comfortable  No sob   + cough yesterday, clear sputum   No fever or chills   Full HD treatment Friday; near EDW  No dietary indiscretion         PAST MEDICAL & SURGICAL HISTORY:  DM (diabetes mellitus), type 1      HTN (hypertension)      ESRD on dialysis        FAMILY HISTORY:    Allergies    No Known Allergies    Intolerances      Home Medications:  senna leaf extract oral tablet: 2 tab(s) orally once a day (at bedtime) (16 Feb 2025 10:22)    MEDICATIONS  (STANDING):  calcitriol   Capsule 0.25 MICROGram(s) Oral daily  cefTRIAXone   IVPB 1000 milliGRAM(s) IV Intermittent every 24 hours  dextrose 5%. 1000 milliLiter(s) (100 mL/Hr) IV Continuous <Continuous>  dextrose 5%. 1000 milliLiter(s) (50 mL/Hr) IV Continuous <Continuous>  dextrose 50% Injectable 25 Gram(s) IV Push once  dextrose 50% Injectable 12.5 Gram(s) IV Push once  dextrose 50% Injectable 25 Gram(s) IV Push once  glucagon  Injectable 1 milliGRAM(s) IntraMuscular once  insulin glargine Injectable (LANTUS) 12 Unit(s) SubCutaneous at bedtime  insulin lispro (ADMELOG) corrective regimen sliding scale   SubCutaneous three times a day before meals  insulin lispro Injectable (ADMELOG) 4 Unit(s) SubCutaneous three times a day before meals  metoprolol succinate ER 25 milliGRAM(s) Oral daily  NIFEdipine XL 90 milliGRAM(s) Oral daily  senna 2 Tablet(s) Oral at bedtime    MEDICATIONS  (PRN):  acetaminophen     Tablet .. 650 milliGRAM(s) Oral every 6 hours PRN Temp greater or equal to 38C (100.4F), Mild Pain (1 - 3)  dextrose Oral Gel 15 Gram(s) Oral once PRN Blood Glucose LESS THAN 70 milliGRAM(s)/deciliter  hydrALAZINE Injectable 10 milliGRAM(s) IV Push two times a day PRN sbp  165  or  gretaer  ondansetron Injectable 4 milliGRAM(s) IV Push every 8 hours PRN Nausea    Vital Signs Last 24 Hrs  T(C): 36.6 (06 Jul 2025 16:39), Max: 37.2 (05 Jul 2025 21:00)  T(F): 97.8 (06 Jul 2025 16:39), Max: 98.9 (05 Jul 2025 21:00)  HR: 106 (06 Jul 2025 16:39) (94 - 111)  BP: 99/62 (06 Jul 2025 16:39) (99/62 - 201/137)  BP(mean): --  RR: 18 (06 Jul 2025 16:39) (14 - 30)  SpO2: 93% (06 Jul 2025 16:39) (93% - 100%)    Parameters below as of 06 Jul 2025 15:20  Patient On (Oxygen Delivery Method): room air        Daily     Daily     CAPILLARY BLOOD GLUCOSE      POCT Blood Glucose.: 255 mg/dL (06 Jul 2025 17:01)  POCT Blood Glucose.: 160 mg/dL (06 Jul 2025 12:21)  POCT Blood Glucose.: 176 mg/dL (06 Jul 2025 08:31)  POCT Blood Glucose.: 394 mg/dL (06 Jul 2025 01:57)  POCT Blood Glucose.: 395 mg/dL (06 Jul 2025 01:56)  POCT Blood Glucose.: 513 mg/dL (05 Jul 2025 22:34)  POCT Blood Glucose.: 513 mg/dL (05 Jul 2025 22:33)  POCT Blood Glucose.: 474 mg/dL (05 Jul 2025 21:14)  POCT Blood Glucose.: 461 mg/dL (05 Jul 2025 21:13)  POCT Blood Glucose.: 542 mg/dL (05 Jul 2025 19:56)  POCT Blood Glucose.: 504 mg/dL (05 Jul 2025 19:56)    PHYSICAL EXAM:      T(C): 36.6 (07-06-25 @ 16:39), Max: 37.2 (07-05-25 @ 21:00)  HR: 106 (07-06-25 @ 16:39) (94 - 111)  BP: 99/62 (07-06-25 @ 16:39) (99/62 - 201/137)  RR: 18 (07-06-25 @ 16:39) (14 - 30)  SpO2: 93% (07-06-25 @ 16:39) (93% - 100%)  Wt(kg): --  Lungs clear  Heart S1S2  Abd soft NT ND  Extremities:   tr edema              07-06    133[L]  |  92[L]  |  51[H]  ----------------------------<  273[H]  4.1   |  29  |  7.81[H]    Ca    8.0[L]      06 Jul 2025 05:41  Phos  2.7     07-05  Mg     2.5     07-05    TPro  6.2  /  Alb  2.4[L]  /  TBili  0.3  /  DBili  x   /  AST  21  /  ALT  24  /  AlkPhos  127[H]  07-06                          9.7    9.32  )-----------( 261      ( 06 Jul 2025 05:41 )             29.8     Creatinine Trend: 7.81<--, 7.41<--, 6.97<--  Urinalysis Basic - ( 06 Jul 2025 05:41 )    Color: x / Appearance: x / SG: x / pH: x  Gluc: 273 mg/dL / Ketone: x  / Bili: x / Urobili: x   Blood: x / Protein: x / Nitrite: x   Leuk Esterase: x / RBC: x / WBC x   Sq Epi: x / Non Sq Epi: x / Bacteria: x      < from: CT Abdomen and Pelvis w/ IV Cont (07.05.25 @ 21:52) >  IMPRESSION:  No acute pulmonary embolus.    Diffuse bilateral groundglass alveolar opacities with confluent   ground glass opacities in the upper lobes. Differential includes edema,   infection, pneumonitis, and ARDS.    No acute abdominal pathology.          Assessment   ESRD, bilateral infiltrates, suspect infectious process vs fluid element    Plan  HD for tomorrow  UF as tolerated  IV abx   Will follow course      Selvin Cosme MD
  Patient is a 24y old  Female who presents with a chief complaint of abd pain (06 Jul 2025 17:49)      Reason For Consult: hyperglycemia     HPI:    24 -year-old female    h/o  type 1 DM, , ESRD on HD  m/w.f, , HTN      AVF.  distal  left arm,,   Permcath, R ant chest  wall         c/o   epigastric pain, n/v    for 1 day.       takes  Lantus  , prior history of hypoglycemic episodes.      per  e r note, pt was  hypoxic  on   4 to 5 L   n/c,   abdomen nontender without reproducible pain.      CTA   no pe,  groundglass opacities,     er, had   recvd    receive 1 L of fluid     renal informed  by er     (06 Jul 2025 10:13)  Currently no HbA1c is available.  At home was on 10 units of Lantus.  Presented with acute hyperglycemia and put on 12 units of Lantus and 4 units of prandial lispro.  Patient currently doing well and the fingersticks already decreasing and in the low 200s    PAST MEDICAL & SURGICAL HISTORY:  DM (diabetes mellitus), type 1      HTN (hypertension)      ESRD on dialysis          FAMILY HISTORY:        Social History:    MEDICATIONS  (STANDING):  calcitriol   Capsule 0.25 MICROGram(s) Oral daily  cefTRIAXone   IVPB 1000 milliGRAM(s) IV Intermittent every 24 hours  dextrose 5%. 1000 milliLiter(s) (100 mL/Hr) IV Continuous <Continuous>  dextrose 5%. 1000 milliLiter(s) (50 mL/Hr) IV Continuous <Continuous>  dextrose 50% Injectable 25 Gram(s) IV Push once  dextrose 50% Injectable 12.5 Gram(s) IV Push once  dextrose 50% Injectable 25 Gram(s) IV Push once  glucagon  Injectable 1 milliGRAM(s) IntraMuscular once  insulin glargine Injectable (LANTUS) 12 Unit(s) SubCutaneous at bedtime  insulin lispro (ADMELOG) corrective regimen sliding scale   SubCutaneous three times a day before meals  insulin lispro Injectable (ADMELOG) 4 Unit(s) SubCutaneous three times a day before meals  metoprolol succinate ER 25 milliGRAM(s) Oral daily  NIFEdipine XL 90 milliGRAM(s) Oral daily  senna 2 Tablet(s) Oral at bedtime    MEDICATIONS  (PRN):  acetaminophen     Tablet .. 650 milliGRAM(s) Oral every 6 hours PRN Temp greater or equal to 38C (100.4F), Mild Pain (1 - 3)  dextrose Oral Gel 15 Gram(s) Oral once PRN Blood Glucose LESS THAN 70 milliGRAM(s)/deciliter  hydrALAZINE Injectable 10 milliGRAM(s) IV Push two times a day PRN sbp  165  or  gretaer  ondansetron Injectable 4 milliGRAM(s) IV Push every 8 hours PRN Nausea      REVIEW OF SYSTEMS:  CONSTITUTIONAL:  as per HPI  HEENT:  Eyes:  No diplopia or blurred vision.       T(C): 36.6 (07-06-25 @ 16:39), Max: 37.2 (07-05-25 @ 21:00)  HR: 106 (07-06-25 @ 16:39) (94 - 111)  BP: 99/62 (07-06-25 @ 16:39) (99/62 - 201/137)  RR: 18 (07-06-25 @ 16:39) (14 - 30)  SpO2: 93% (07-06-25 @ 16:39) (93% - 100%)  Wt(kg): --    PHYSICAL EXAM:  GENERAL: NAD, well-groomed, well-developed  HEAD:  Atraumatic, Normocephalic      CAPILLARY BLOOD GLUCOSE      POCT Blood Glucose.: 255 mg/dL (06 Jul 2025 17:01)  POCT Blood Glucose.: 160 mg/dL (06 Jul 2025 12:21)  POCT Blood Glucose.: 176 mg/dL (06 Jul 2025 08:31)  POCT Blood Glucose.: 394 mg/dL (06 Jul 2025 01:57)  POCT Blood Glucose.: 395 mg/dL (06 Jul 2025 01:56)  POCT Blood Glucose.: 513 mg/dL (05 Jul 2025 22:34)  POCT Blood Glucose.: 513 mg/dL (05 Jul 2025 22:33)  POCT Blood Glucose.: 474 mg/dL (05 Jul 2025 21:14)  POCT Blood Glucose.: 461 mg/dL (05 Jul 2025 21:13)  POCT Blood Glucose.: 542 mg/dL (05 Jul 2025 19:56)  POCT Blood Glucose.: 504 mg/dL (05 Jul 2025 19:56)                            9.7    9.32  )-----------( 261      ( 06 Jul 2025 05:41 )             29.8       CMP:  07-06 @ 05:41  SGPT 24  Albumin 2.4   Alk Phos 127   Anion Gap 12   SGOT 21   Total Bili 0.3   BUN 51   Calcium Total 8.0   CO2 29   Chloride 92   Creatinine 7.81   eGFR if AA --   eGFR if non AA --   Glucose 273   Potassium 4.1   Protein 6.2   Sodium 133      Thyroid Function Tests:      Diabetes Tests:     Parathyroids:     Adrenals:       Radiology:

## 2025-07-06 NOTE — H&P ADULT - ASSESSMENT
24 -year-old female    h/o  type 1 DM, , ESRD on HD  m/w.f, , HTN        AVF.  distal  left arm,,   Permcath, R ant chest  wall         c/o   epigastric pain, n/v    for 1 day. , has improved      CT, , no pe, ground glass  opacities       per  er  note, pt was  hypoxic  on   4 to 5 L   n/c,,  and in  er, had   recvd    receive 1 L of fluid     acute   diastolic chf/  fluid overboard       renal informed  by er /  need s  urgent HD   CKD, on HD, has  Permcath   HTN  DM, type 1    with  h/o  hypoglycemia  in past    now  in er,  with hypergycemia   on   lantus/  admelog   endo dr ledesma  HTN    on procadia 90 mg  qd/  prn hydralazine  for  now    elevated bp  should  improve with HD    dvt  ppx   encounter   time  60  minutes     rad< from: CT Angio Chest PE Protocol w/ IV Cont (07.05.25 @ 21:43) >  MPRESSION:  No acute pulmonary embolus.  Diffuse bilateral groundglass alveolar opacities with confluent   groundglass opacities in the upper lobes. Differential includes edema,   infection, pneumonitis, and ARDS.  No acute abdominal pathology.  --- End of Report ---                24 -year-old female    h/o  type 1 DM, , ESRD on HD  m/w.f, , HTN        AVF.  distal  left arm,,   Permcath, R ant chest  wall         c/o   epigastric pain, n/v    for 1 day. , has improved      CT, , no pe, ground glass  opacities       per  er  note, pt was  hypoxic  on   4 to 5 L   n/c,,  and in  er, had   recvd    receive 1 L of fluid     acute   diastolic chf/  fluid overboard       renal informed  by er /  need s  urgent HD   CKD, on HD, has  Permcath   HTN  DM, type 1    with  h/o  hypoglycemia  in past/  does not us e he r pump    pt  not very  diligent/  has  varying fs.  hyper  and hypogycemia/  haley s not  f/p  with endo    now  in er,  with hyperglycemia , about  500 on arrival    now  fs is  176    on   lantus/  admelog   endo dr ledesma  HTN    on procadia 90 mg  qd/  prn hydralazine  for  now    elevated bp  should  improve with HD    dvt  ppx   encounter   time  60  minutes     rad< from: CT Angio Chest PE Protocol w/ IV Cont (07.05.25 @ 21:43) >  MPRESSION:  No acute pulmonary embolus.  Diffuse bilateral groundglass alveolar opacities with confluent   groundglass opacities in the upper lobes. Differential includes edema,   infection, pneumonitis, and ARDS.  No acute abdominal pathology.  --- End of Report ---                24 -year-old female    h/o  type 1 DM, , ESRD on HD  m/w.f, , HTN        AVF. distal  left arm,,  Permcath, R ant chest  wall         c/o   epigastric pain, n/v    for 1 day. ,  has improved      CT,  no pe, ground glass  opacities       per  er  note, pt was  hypoxic  on   4 to 5 L   n/c,,  and in  er, had   recvd    receive 1 L of fluid     acute   diastolic chf/  fluid overboard       renal informed  by er /  needs  urgent HD   CKD, on HD, has  Permcath   HTN  DM, type 1    with  h/o  hypoglycemia  in past/  does not use   he r pump    pt  not very  diligent/  has  varying fs.  hyper  and hypogycemia/  does  not  f/p  with endo    need  to r/o     in er,  with hyperglycemia  on arrival,  about  500 on arrival    now  fs is  176    on   lantus/  admelog   endo dr ledesma  HTN    on Procardia  90 mg  qd/  prn hydralazine  for  now    elevated bp  should  improve with HD    discussed  with er  nurse  Mik    dvt  ppx   encounter   time  60  minutes     rad< from: CT Angio Chest PE Protocol w/ IV Cont (07.05.25 @ 21:43) >  MPRESSION:  No acute pulmonary embolus.  Diffuse bilateral groundglass alveolar opacities with confluent   groundglass opacities in the upper lobes. Differential includes edema,   infection, pneumonitis, and ARDS.  No acute abdominal pathology.  --- End of Report ---                24 -year-old female    h/o  type 1 DM, , ESRD on HD  m/w.f, , HTN        AVF. distal  left arm,,  Permcath, R ant chest  wall         c/o   epigastric pain, n/v    for 1 day. , has improved     CT,  no pe, ground glass  opacities       per  er  note, pt was  hypoxic  on   4 to 5 L   n/c,,  and in  er, had   recvd    receive 1 L of fluid     acute   diastolic chf/  fluid overboard       renal informed  by er /  needs  urgent HD   clinically,  doubt pna, on empiric  iv rocephin  by er   CKD, on H      , has  Permcath   HTN   DM, type 1    with  h/o  hypoglycemia  in past/  does not use   he r pump    pt  not very  diligent/  has  varying fs.  hyper  and hypogycemia/  does  not  f/p  with endo    need  to r/o     in er,  with hyperglycemia  on arrival,  about  500 on arrival    now  fs is  176    on   lantus/  admelog   endo dr ledesma  HTN    on Procardia  90 mg  qd/  prn hydralazine  for  now    elevated bp  should  improve with HD    discussed  with er  nurse  Mik lozoya  ppx   encounter   time  60  minutes     rad< from: CT Angio Chest PE Protocol w/ IV Cont (07.05.25 @ 21:43) >  MPRESSION:  No acute pulmonary embolus.  Diffuse bilateral groundglass alveolar opacities with confluent   groundglass opacities in the upper lobes. Differential includes edema,   infection, pneumonitis, and ARDS.  No acute abdominal pathology.  --- End of Report ---                24 -year-old female    h/o  type 1 DM, , ESRD on HD  m/w.f, , HTN        AVF. distal  left arm,,  Permcath, R ant chest  wall         c/o   epigastric pain, n/v    for 1 day. , has improved     CT,  no pe, ground glass  opacities       per  er  note, pt was  hypoxic  on   4 to 5 L   n/c,,  and in  er, had   recvd    receive 1 L of fluid     acute   diastolic chf/  fluid overboard       renal informed  by er /  needs  urgent HD   clinically,  doubt pna, on empiric  iv rocephin  by er   CKD, on H      , has  Permcath   HTN   DM, type 1    with  h/o  hypoglycemia  in past/  does not use   her  insulin  pump    pt  not very  diligent with he r  lifestyle ,  has  varying fs.  hyper  and hypogycemia/  does  not  f/p  with endo    need  to r/o  dka. and  beta  hydroxy  butytrate. /pending    anion   gap  12.  hco3  is  29     in er,  with hyperglycemia  on arrival,  about  500 on arrival,  and  now  fs is  176    on   lantus/  admelog     endo dr ledesma  HTN    on Procardia  90 mg  qd/  prn hydralazine  for  now    elevated bp  should  improve with HD    discussed  with er  nurse  Mik    dvt  ppx   encounter   time  60  minutes     rad< from: CT Angio Chest PE Protocol w/ IV Cont (07.05.25 @ 21:43) >  MPRESSION:  No acute pulmonary embolus.  Diffuse bilateral groundglass alveolar opacities with confluent   groundglass opacities in the upper lobes. Differential includes edema,   infection, pneumonitis, and ARDS.  No acute abdominal pathology.  --- End of Report ---

## 2025-07-06 NOTE — ED PROVIDER NOTE - CARE PLAN
1 Principal Discharge DX:	Hyperglycemia  Secondary Diagnosis:	Acute respiratory failure with hypoxia  Secondary Diagnosis:	Pulmonary edema, acute

## 2025-07-06 NOTE — CONSULT NOTE ADULT - PROBLEM SELECTOR RECOMMENDATION 9
Continue with the current  regimen while inpatient   Check C-Peptide and HbA1C   while inpatient, finger sticks should be 100-180   Thank You for the courtesy of this consultation !!!

## 2025-07-06 NOTE — H&P ADULT - NSHPPHYSICALEXAM_GEN_ALL_CORE
T(C): 36.6 (07-06-25 @ 08:14), Max: 37.3 (07-05-25 @ 17:38)  HR: 94 (07-06-25 @ 08:14) (94 - 118)  BP: 196/138 (07-06-25 @ 10:00) (154/111 - 203/103)  RR: 14 (07-06-25 @ 08:14) (14 - 30)  SpO2: 100% (07-06-25 @ 08:14) (99% - 100%)  GENERAL: NAD, lying in bed comfortably  HEAD:  Atraumatic, normocephalic  EYES: EOMI, PERRLA, conjunctiva and sclera clear  NECK: Supple, trachea midline, no JVD  HEART: Regular rate and rhythm, no murmurs, rubs, or gallops  LUNGS: Unlabored respirations.   dcerased  bs  bases  ABDOMEN: Soft, nontender, nondistended, +BS  EXTREMITIES: 2+ peripheral pulses bilaterally. No clubbing, cyanosis, or edema  NERVOUS SYSTEM:  A&Ox3, moving all extremities, no focal deficits   SKIN: No rashes or lesions

## 2025-07-06 NOTE — ED PROVIDER NOTE - PROGRESS NOTE DETAILS
[place] DO: Patient received at signout at change of shift at 7 PM patient has type I diabetic, end-stage renal disease, with last session of dialysis Friday via chest wall port -who presents for epigastric pain nausea and vomiting duration for 1 day.  Patient forgot to take her insulin last night.  Chart review demonstrates prior Lantus dose 10 units, prior history of hypoglycemic episodes.  Patient reports most recently Lantus 16 units at night.  As patient n.p.o., Lantus 10 units.  Labs without acidemia or significant anion gap to suggest DKA, CT angio ordered times patient noted to be hypoxic after signout on 4 to 5 L nasal cannula comfortable appearing, abdomen nontender without reproducible pain.  CTA without PE but noted groundglass opacities, possible infectious, possible edema.  Patient did receive 1 L of fluid prior to my evaluation. Pt does have occasional cough, was treated for sepsis already and given abx, clinical exam suggests likely pulm edema from fluids in setting of ESRD.  Nephrology informed of pts presentation -will see patient.  For patient To the ED for serial reassessments and glycemic control.  Given labetalol for hypertension.  Given insulin Lantus with improvement, kept n.p.o.  Reassessed at this time.  And comfortable on nasal cannula with normal work of breathing, stable for floor admission without indicated need for emergent dialysis but pt does have port access if dialysis is needed. Patient updated on plan of care. [place] DO: Patient received at signout at change of shift at 7 PM patient has type I diabetic, end-stage renal disease, with last session of dialysis Friday via chest wall port -who presents for epigastric pain nausea and vomiting duration for 1 day.  Patient forgot to take her insulin last night.  Chart review demonstrates prior Lantus dose 10 units, prior history of hypoglycemic episodes.  Patient reports most recently Lantus 16 units at night.  As patient n.p.o.,  dosed Lantus 10 units.  Labs without acidemia or significant anion gap to suggest DKA, CT angio ordered as  patient noted to be hypoxic after signout on 4 to 5 L nasal cannula- comfortable appearing, abdomen nontender without reproducible pain.  CTA without PE but noted groundglass opacities, possible infectious, possible edema.  Patient did receive 1 L of fluid prior to my evaluation. Pt does have occasional cough, was treated for sepsis already and given abx, clinical exam suggests likely pulm edema from fluids in setting of ESRD.  Nephrology informed of pts presentation -will see patient.  Patient well appearing with improved abd pain on serial reassessments, ISS for glycemic control.  Given labetalol for hypertension.  Given insulin Lantus with improvement, kept n.p.o.  Reassessed at this time.  And comfortable on nasal cannula with normal work of breathing, stable for floor admission without indicated need for emergent dialysis but pt does have port access if dialysis is needed. Patient updated on plan of care.

## 2025-07-06 NOTE — H&P ADULT - NSHPREVIEWOFSYSTEMS_GEN_ALL_CORE
REVIEW OF SYSTEMS:  CONSTITUTIONAL: No fever,  no  weight loss  ENT:  No  tinnitus,   no   vertigo  NECK: No pain or stiffness  RESPIRATORY: No cough, wheezing, chills or hemoptysis;   +  Shortness of Breath  CARDIOVASCULAR: No chest pain, palpitations, dizziness  GASTROINTESTINAL:  had  abdominal or epigastric pain.   nausea,   no vomiting, or hematemesis; No diarrhea  No melena or hematochezia.  GENITOURINARY: No dysuria, frequency, hematuria, or incontinence  NEUROLOGICAL: No headaches  SKIN: No itching,  no   rash  LYMPH Nodes: No enlarged glands  ENDOCRINE: No heat or cold intolerance  MUSCULOSKELETAL: No joint pain or swelling  PSYCHIATRIC: No depression, anxiety  HEME/LYMPH: No easy bruising, or bleeding gums  ALLERGY AND IMMUNOLOGIC: No hives or eczema

## 2025-07-06 NOTE — ED PROVIDER NOTE - OBJECTIVE STATEMENT
24 year old female with hx of T1DM and ESRD. Patient is arriving in the ED with chief complaint of vomiting and abdominal pain. ESRD M.W.F. Per EMS patient was hypoxic on their arrival.

## 2025-07-06 NOTE — H&P ADULT - NSHPLABSRESULTS_GEN_ALL_CORE
LABS:                        9.7    9.32  )-----------( 261      ( 06 Jul 2025 05:41 )             29.8     07-06    133[L]  |  92[L]  |  51[H]  ----------------------------<  273[H]  4.1   |  29  |  7.81[H]    Ca    8.0[L]      06 Jul 2025 05:41  Phos  2.7     07-05  Mg     2.5     07-05    TPro  6.2  /  Alb  2.4[L]  /  TBili  0.3  /  DBili  x   /  AST  21  /  ALT  24  /  AlkPhos  127[H]  07-06    PT/INR - ( 05 Jul 2025 18:30 )   PT: 10.8 sec;   INR: 0.93 ratio         PTT - ( 05 Jul 2025 18:30 )  PTT:25.5 sec      Urinalysis Basic - ( 06 Jul 2025 05:41 )    Color: x / Appearance: x / SG: x / pH: x  Gluc: 273 mg/dL / Ketone: x  / Bili: x / Urobili: x   Blood: x / Protein: x / Nitrite: x   Leuk Esterase: x / RBC: x / WBC x   Sq Epi: x / Non Sq Epi: x / Bacteria: x      Lactate, Blood: 2.8 mmol/L (07-06 @ 05:41)  Lactate, Blood: 1.7 mmol/L (07-05 @ 18:30)      07-06 @ 00:14  4.8  51

## 2025-07-06 NOTE — PATIENT PROFILE ADULT - FALL HARM RISK - RISK INTERVENTIONS
Assistance OOB with selected safe patient handling equipment/Assistance with ambulation/Communicate Fall Risk and Risk Factors to all staff, patient, and family/Discuss with provider need for PT consult/Monitor gait and stability/Reinforce activity limits and safety measures with patient and family/Visual Cue: Yellow wristband/Bed in lowest position, wheels locked, appropriate side rails in place/Call bell, personal items and telephone in reach/Instruct patient to call for assistance before getting out of bed or chair/Non-slip footwear when patient is out of bed/[place] to call system/Physically safe environment - no spills, clutter or unnecessary equipment/Purposeful Proactive Rounding/Room/bathroom lighting operational, light cord in reach

## 2025-07-07 LAB
A1C WITH ESTIMATED AVERAGE GLUCOSE RESULT: 7.2 % — HIGH (ref 4–5.6)
ANION GAP SERPL CALC-SCNC: 13 MMOL/L — SIGNIFICANT CHANGE UP (ref 5–17)
BUN SERPL-MCNC: 68 MG/DL — HIGH (ref 7–23)
CALCIUM SERPL-MCNC: 7.9 MG/DL — LOW (ref 8.5–10.1)
CHLORIDE SERPL-SCNC: 91 MMOL/L — LOW (ref 96–108)
CO2 SERPL-SCNC: 28 MMOL/L — SIGNIFICANT CHANGE UP (ref 22–31)
CREAT SERPL-MCNC: 10.6 MG/DL — HIGH (ref 0.5–1.3)
EGFR: 5 ML/MIN/1.73M2 — LOW
EGFR: 5 ML/MIN/1.73M2 — LOW
ESTIMATED AVERAGE GLUCOSE: 160 MG/DL — HIGH (ref 68–114)
GLUCOSE BLDC GLUCOMTR-MCNC: 219 MG/DL — HIGH (ref 70–99)
GLUCOSE BLDC GLUCOMTR-MCNC: 224 MG/DL — HIGH (ref 70–99)
GLUCOSE BLDC GLUCOMTR-MCNC: 294 MG/DL — HIGH (ref 70–99)
GLUCOSE BLDC GLUCOMTR-MCNC: 453 MG/DL — CRITICAL HIGH (ref 70–99)
GLUCOSE BLDC GLUCOMTR-MCNC: 510 MG/DL — CRITICAL HIGH (ref 70–99)
GLUCOSE SERPL-MCNC: 66 MG/DL — LOW (ref 70–99)
HCT VFR BLD CALC: 30.1 % — LOW (ref 34.5–45)
HGB BLD-MCNC: 9.7 G/DL — LOW (ref 11.5–15.5)
MCHC RBC-ENTMCNC: 30.3 PG — SIGNIFICANT CHANGE UP (ref 27–34)
MCHC RBC-ENTMCNC: 32.2 G/DL — SIGNIFICANT CHANGE UP (ref 32–36)
MCV RBC AUTO: 94.1 FL — SIGNIFICANT CHANGE UP (ref 80–100)
NRBC BLD AUTO-RTO: 0 /100 WBCS — SIGNIFICANT CHANGE UP (ref 0–0)
NT-PROBNP SERPL-SCNC: HIGH PG/ML (ref 0–125)
PLATELET # BLD AUTO: 327 K/UL — SIGNIFICANT CHANGE UP (ref 150–400)
POTASSIUM SERPL-MCNC: 3.8 MMOL/L — SIGNIFICANT CHANGE UP (ref 3.5–5.3)
POTASSIUM SERPL-SCNC: 3.8 MMOL/L — SIGNIFICANT CHANGE UP (ref 3.5–5.3)
RBC # BLD: 3.2 M/UL — LOW (ref 3.8–5.2)
RBC # FLD: 15.9 % — HIGH (ref 10.3–14.5)
SODIUM SERPL-SCNC: 132 MMOL/L — LOW (ref 135–145)
WBC # BLD: 7.17 K/UL — SIGNIFICANT CHANGE UP (ref 3.8–10.5)
WBC # FLD AUTO: 7.17 K/UL — SIGNIFICANT CHANGE UP (ref 3.8–10.5)

## 2025-07-07 PROCEDURE — 99232 SBSQ HOSP IP/OBS MODERATE 35: CPT

## 2025-07-07 RX ORDER — DEXTROSE 50 % IN WATER 50 %
25 SYRINGE (ML) INTRAVENOUS ONCE
Refills: 0 | Status: DISCONTINUED | OUTPATIENT
Start: 2025-07-07 | End: 2025-07-08

## 2025-07-07 RX ORDER — INSULIN GLARGINE-YFGN 100 [IU]/ML
20 INJECTION, SOLUTION SUBCUTANEOUS AT BEDTIME
Refills: 0 | Status: DISCONTINUED | OUTPATIENT
Start: 2025-07-07 | End: 2025-07-08

## 2025-07-07 RX ORDER — INSULIN GLARGINE-YFGN 100 [IU]/ML
16 INJECTION, SOLUTION SUBCUTANEOUS AT BEDTIME
Refills: 0 | Status: DISCONTINUED | OUTPATIENT
Start: 2025-07-07 | End: 2025-07-07

## 2025-07-07 RX ORDER — SODIUM CHLORIDE 9 G/1000ML
1000 INJECTION, SOLUTION INTRAVENOUS
Refills: 0 | Status: DISCONTINUED | OUTPATIENT
Start: 2025-07-07 | End: 2025-07-09

## 2025-07-07 RX ORDER — INSULIN LISPRO 100 U/ML
INJECTION, SOLUTION INTRAVENOUS; SUBCUTANEOUS
Refills: 0 | Status: DISCONTINUED | OUTPATIENT
Start: 2025-07-07 | End: 2025-07-08

## 2025-07-07 RX ORDER — INSULIN LISPRO 100 U/ML
12 INJECTION, SOLUTION INTRAVENOUS; SUBCUTANEOUS ONCE
Refills: 0 | Status: COMPLETED | OUTPATIENT
Start: 2025-07-07 | End: 2025-07-07

## 2025-07-07 RX ORDER — METOPROLOL SUCCINATE 50 MG/1
25 TABLET, EXTENDED RELEASE ORAL
Refills: 0 | Status: DISCONTINUED | OUTPATIENT
Start: 2025-07-07 | End: 2025-07-08

## 2025-07-07 RX ORDER — DEXTROSE 50 % IN WATER 50 %
12.5 SYRINGE (ML) INTRAVENOUS ONCE
Refills: 0 | Status: DISCONTINUED | OUTPATIENT
Start: 2025-07-07 | End: 2025-07-08

## 2025-07-07 RX ORDER — GLUCAGON 3 MG/1
1 POWDER NASAL ONCE
Refills: 0 | Status: DISCONTINUED | OUTPATIENT
Start: 2025-07-07 | End: 2025-07-09

## 2025-07-07 RX ORDER — INSULIN LISPRO 100 U/ML
6 INJECTION, SOLUTION INTRAVENOUS; SUBCUTANEOUS
Refills: 0 | Status: DISCONTINUED | OUTPATIENT
Start: 2025-07-07 | End: 2025-07-08

## 2025-07-07 RX ORDER — DEXTROSE 50 % IN WATER 50 %
15 SYRINGE (ML) INTRAVENOUS ONCE
Refills: 0 | Status: DISCONTINUED | OUTPATIENT
Start: 2025-07-07 | End: 2025-07-08

## 2025-07-07 RX ADMIN — CEFTRIAXONE 100 MILLIGRAM(S): 500 INJECTION, POWDER, FOR SOLUTION INTRAMUSCULAR; INTRAVENOUS at 18:34

## 2025-07-07 RX ADMIN — Medication 90 MILLIGRAM(S): at 05:16

## 2025-07-07 RX ADMIN — INSULIN LISPRO 4 UNIT(S): 100 INJECTION, SOLUTION INTRAVENOUS; SUBCUTANEOUS at 08:24

## 2025-07-07 RX ADMIN — INSULIN LISPRO 6 UNIT(S): 100 INJECTION, SOLUTION INTRAVENOUS; SUBCUTANEOUS at 16:38

## 2025-07-07 RX ADMIN — INSULIN GLARGINE-YFGN 20 UNIT(S): 100 INJECTION, SOLUTION SUBCUTANEOUS at 23:06

## 2025-07-07 RX ADMIN — Medication 2 TABLET(S): at 23:07

## 2025-07-07 RX ADMIN — INSULIN LISPRO 12 UNIT(S): 100 INJECTION, SOLUTION INTRAVENOUS; SUBCUTANEOUS at 21:06

## 2025-07-07 RX ADMIN — INSULIN LISPRO 3: 100 INJECTION, SOLUTION INTRAVENOUS; SUBCUTANEOUS at 16:38

## 2025-07-07 RX ADMIN — INSULIN LISPRO 2: 100 INJECTION, SOLUTION INTRAVENOUS; SUBCUTANEOUS at 08:24

## 2025-07-07 RX ADMIN — METOPROLOL SUCCINATE 25 MILLIGRAM(S): 50 TABLET, EXTENDED RELEASE ORAL at 05:16

## 2025-07-07 RX ADMIN — CALCITRIOL 0.25 MICROGRAM(S): 0.5 CAPSULE, GELATIN COATED ORAL at 16:39

## 2025-07-07 NOTE — PROGRESS NOTE ADULT - ASSESSMENT
24 -year-old female    h/o  type 1 DM, , ESRD on HD  m/w.f, , HTN        AVF.  distal  left arm,,  Permcath , R ant chest  wall         c/o   epigastric pain, n/v    for 1 day. , has improved     CT,  no pe, ground glass  opacities       per  er  note, pt was  hypoxic  on   4 to 5 L   n/c,,  and in  er, had   recvd    receive 1 L of fluid     acute   diastolic chf/  fluid overboard       renal informed  by er /  needs  urgent HD    clinically,  doubt pna, on empiric  iv rocephin  by er   CKD, on H       has  Permcath   HTN   DM, type 1    with  h/o  hypoglycemia  in past/  does not use   her  insulin  pump    pt  not very  diligent with he r  lifestyle ,  has  varying fs.  hyper  and hypogycemia/  does  not  f/p  with endo    need  to r/o  dka. and  beta  hydroxy  butytrate. /pending    anion   gap  12.  hco3  is  29     in er,  with hyperglycemia  on arrival,  about  500 on arrival,  and  now  fs is  176    on   lantus/  admelog    insulin,  a s per  endo dr ledesma  HTN      bp  noted.  and provardia  stopped, on bid  lopressor,  ha s mild  tachycardia     dvt  ppx   encounter   time   35   minutes     rad< from: CT Angio Chest PE Protocol w/ IV Cont (07.05.25 @ 21:43) >  MPRESSION:  No acute pulmonary embolus.  Diffuse bilateral groundglass alveolar opacities with confluent   groundglass opacities in the upper lobes. Differential includes edema,   infection, pneumonitis, and ARDS.  No acute abdominal pathology.  --- End of Report ---

## 2025-07-08 ENCOUNTER — TRANSCRIPTION ENCOUNTER (OUTPATIENT)
Age: 25
End: 2025-07-08

## 2025-07-08 LAB
A1C WITH ESTIMATED AVERAGE GLUCOSE RESULT: 7.8 % — HIGH (ref 4–5.6)
AUTO DIFF PNL BLD: NEGATIVE — SIGNIFICANT CHANGE UP
C-ANCA SER-ACNC: NEGATIVE — SIGNIFICANT CHANGE UP
ESTIMATED AVERAGE GLUCOSE: 177 MG/DL — HIGH (ref 68–114)
GLUCOSE BLDC GLUCOMTR-MCNC: 129 MG/DL — HIGH (ref 70–99)
GLUCOSE BLDC GLUCOMTR-MCNC: 150 MG/DL — HIGH (ref 70–99)
GLUCOSE BLDC GLUCOMTR-MCNC: 76 MG/DL — SIGNIFICANT CHANGE UP (ref 70–99)
GLUCOSE BLDC GLUCOMTR-MCNC: 95 MG/DL — SIGNIFICANT CHANGE UP (ref 70–99)
P-ANCA SER-ACNC: NEGATIVE — SIGNIFICANT CHANGE UP

## 2025-07-08 PROCEDURE — 99232 SBSQ HOSP IP/OBS MODERATE 35: CPT

## 2025-07-08 RX ORDER — INSULIN LISPRO 100 U/ML
INJECTION, SOLUTION INTRAVENOUS; SUBCUTANEOUS
Refills: 0 | Status: DISCONTINUED | OUTPATIENT
Start: 2025-07-08 | End: 2025-07-08

## 2025-07-08 RX ORDER — DEXTROSE 50 % IN WATER 50 %
25 SYRINGE (ML) INTRAVENOUS ONCE
Refills: 0 | Status: DISCONTINUED | OUTPATIENT
Start: 2025-07-08 | End: 2025-07-09

## 2025-07-08 RX ORDER — INSULIN LISPRO 100 U/ML
8 INJECTION, SOLUTION INTRAVENOUS; SUBCUTANEOUS
Refills: 0 | Status: DISCONTINUED | OUTPATIENT
Start: 2025-07-08 | End: 2025-07-08

## 2025-07-08 RX ORDER — METOPROLOL SUCCINATE 50 MG/1
25 TABLET, EXTENDED RELEASE ORAL DAILY
Refills: 0 | Status: DISCONTINUED | OUTPATIENT
Start: 2025-07-08 | End: 2025-07-09

## 2025-07-08 RX ORDER — DEXTROSE 50 % IN WATER 50 %
12.5 SYRINGE (ML) INTRAVENOUS ONCE
Refills: 0 | Status: DISCONTINUED | OUTPATIENT
Start: 2025-07-08 | End: 2025-07-08

## 2025-07-08 RX ORDER — INSULIN GLARGINE-YFGN 100 [IU]/ML
22 INJECTION, SOLUTION SUBCUTANEOUS AT BEDTIME
Refills: 0 | Status: DISCONTINUED | OUTPATIENT
Start: 2025-07-08 | End: 2025-07-09

## 2025-07-08 RX ORDER — SODIUM CHLORIDE 9 G/1000ML
1000 INJECTION, SOLUTION INTRAVENOUS
Refills: 0 | Status: DISCONTINUED | OUTPATIENT
Start: 2025-07-08 | End: 2025-07-09

## 2025-07-08 RX ORDER — DEXTROSE 50 % IN WATER 50 %
15 SYRINGE (ML) INTRAVENOUS ONCE
Refills: 0 | Status: DISCONTINUED | OUTPATIENT
Start: 2025-07-08 | End: 2025-07-08

## 2025-07-08 RX ORDER — INSULIN LISPRO 100 U/ML
6 INJECTION, SOLUTION INTRAVENOUS; SUBCUTANEOUS
Refills: 0 | Status: DISCONTINUED | OUTPATIENT
Start: 2025-07-08 | End: 2025-07-09

## 2025-07-08 RX ORDER — DEXTROSE 50 % IN WATER 50 %
12.5 SYRINGE (ML) INTRAVENOUS ONCE
Refills: 0 | Status: DISCONTINUED | OUTPATIENT
Start: 2025-07-08 | End: 2025-07-09

## 2025-07-08 RX ORDER — DEXTROSE 50 % IN WATER 50 %
25 SYRINGE (ML) INTRAVENOUS ONCE
Refills: 0 | Status: DISCONTINUED | OUTPATIENT
Start: 2025-07-08 | End: 2025-07-08

## 2025-07-08 RX ORDER — INSULIN GLARGINE-YFGN 100 [IU]/ML
22 INJECTION, SOLUTION SUBCUTANEOUS AT BEDTIME
Refills: 0 | Status: DISCONTINUED | OUTPATIENT
Start: 2025-07-08 | End: 2025-07-08

## 2025-07-08 RX ORDER — DEXTROSE 50 % IN WATER 50 %
15 SYRINGE (ML) INTRAVENOUS ONCE
Refills: 0 | Status: DISCONTINUED | OUTPATIENT
Start: 2025-07-08 | End: 2025-07-09

## 2025-07-08 RX ORDER — INSULIN LISPRO 100 U/ML
INJECTION, SOLUTION INTRAVENOUS; SUBCUTANEOUS
Refills: 0 | Status: DISCONTINUED | OUTPATIENT
Start: 2025-07-08 | End: 2025-07-09

## 2025-07-08 RX ADMIN — INSULIN LISPRO 6 UNIT(S): 100 INJECTION, SOLUTION INTRAVENOUS; SUBCUTANEOUS at 17:06

## 2025-07-08 RX ADMIN — INSULIN LISPRO 6 UNIT(S): 100 INJECTION, SOLUTION INTRAVENOUS; SUBCUTANEOUS at 07:56

## 2025-07-08 RX ADMIN — INSULIN GLARGINE-YFGN 22 UNIT(S): 100 INJECTION, SOLUTION SUBCUTANEOUS at 21:38

## 2025-07-08 RX ADMIN — Medication 2 MILLIGRAM(S): at 20:25

## 2025-07-08 RX ADMIN — METOPROLOL SUCCINATE 25 MILLIGRAM(S): 50 TABLET, EXTENDED RELEASE ORAL at 17:05

## 2025-07-08 RX ADMIN — Medication 2 TABLET(S): at 21:38

## 2025-07-08 RX ADMIN — METOPROLOL SUCCINATE 25 MILLIGRAM(S): 50 TABLET, EXTENDED RELEASE ORAL at 06:31

## 2025-07-08 RX ADMIN — INSULIN LISPRO 8 UNIT(S): 100 INJECTION, SOLUTION INTRAVENOUS; SUBCUTANEOUS at 11:03

## 2025-07-08 RX ADMIN — CALCITRIOL 0.25 MICROGRAM(S): 0.5 CAPSULE, GELATIN COATED ORAL at 11:04

## 2025-07-08 NOTE — DISCHARGE NOTE PROVIDER - HOSPITAL COURSE
24 -year-old female    h/o  type 1 DM, , ESRD on HD  m/w.f, , HTN        AVF.  distal  left arm, Permcath , R ant chest  wall         c/o   epigastric pain, n/v    for 1 day. , has improved     CT,  no pe, ground glass  opacities       per  er  note, pt was  hypoxic  on   4 to 5 L   n/c,,  and in  er, had   recvd    receive 1 L of fluid     acute   diastolic chf/  fluid overboard        cxr, opacities.  c/w  chf.  needing  hd  fluid  removal   CKD, on H       has  Permcath   HTN   DM, type 1    with  h/o  hypoglycemia  in past/  does not use   her  insulin  pump    pt  not very  diligent with he r  lifestyle ,  has  varying fs.  hyper  and hypogycemia/  does  not  f/p  with endocrinology/  discussed  with he r hcp    anion   gap  12.  hco3  is  29     in er,  with hyperglycemia  on arrival,  about  500 on arrival,  and  now  fs  ha s improved   on   lantus/  admelog.  increased    insulin,  per  endo dr ledesma  HTN      bp  noted.  and  Procardia was   stopped,     on  toprol    doing  better     spoke with  pcp/  f/p   Discharge Diagnoses:    Acute Diastolic Congestive Heart Failure (CHF)/Fluid Overload, resolved  End-Stage Renal Disease (ESRD) on Hemodialysis (HD)  Type 1 Diabetes Mellitus (T1DM), uncontrolled  Hypertension (HTN)  Symptomatic Anemia    Hospital Course:    24-year-old female with a history of T1DM, ESRD on HD, and HTN presented with epigastric pain, nausea, and vomiting for one day. Her symptoms had improved by the time of admission. A CT scan showed no pulmonary embolism but did reveal ground-glass opacities. Per the ER note, the patient was hypoxic on 4-5 L of supplemental oxygen and received 1 L of IV fluids. She was diagnosed with acute diastolic CHF/fluid overload. A chest x-ray showed opacities consistent with CHF. She required hemodialysis for fluid removal. Given her ESRD, she already had a permacath in place. Her history of uncontrolled T1DM was noted, including past episodes of hypoglycemia and inconsistent insulin pump use. She also has a history of non-compliance with her diabetes management and does not follow up with Endocrinology regularly. She presented with hyperglycemia (glucose ~500 mg/dL), which subsequently improved. Her insulin regimen (lantus/lispro) was adjusted, and doses were increased per Endocrinology (Dr. Rosenberg). Her hypertension was also addressed. Her primary care physician was contacted for follow-up.    Patient seen and evaluated. DC home. DC time 37 mins.

## 2025-07-08 NOTE — DISCHARGE NOTE PROVIDER - PROVIDER TOKENS
PROVIDER:[TOKEN:[[id number]]] PROVIDER:[TOKEN:[[id number]]],FREE:[LAST:[Your PCP],PHONE:[(   )    -],FAX:[(   )    -],FOLLOWUP:[1 week]]

## 2025-07-08 NOTE — DISCHARGE NOTE PROVIDER - NSDCCPCAREPLAN_GEN_ALL_CORE_FT
PRINCIPAL DISCHARGE DIAGNOSIS  Diagnosis: Hyperglycemia  Assessment and Plan of Treatment: Your blood sugar was >500.  Medication was adjusted.   Please follow up with your endocrinologist.      SECONDARY DISCHARGE DIAGNOSES  Diagnosis: Acute respiratory failure with hypoxia  Assessment and Plan of Treatment: Resolved with fluid removal during dialysis.  Continue HD as scheduled.

## 2025-07-08 NOTE — DISCHARGE NOTE PROVIDER - CARE PROVIDER_API CALL
Elie Rosenberg  Endocrinology Diabetes and Metabolism  [address], [address]  [place] [address]  Phone: [phone]  Fax: [phone]  Follow Up Time:    Elie Rosenberg  Endocrinology Diabetes and Metabolism  [address], [facility] [address]  [place] [address]  Phone: [phone]  Fax: [phone]  Follow Up Time:     Your PCP,   Phone: (   )    -  Fax: (   )    -  Follow Up Time: 1 week

## 2025-07-08 NOTE — DISCHARGE NOTE PROVIDER - NSDCMRMEDTOKEN_GEN_ALL_CORE_FT
amLODIPine 5 mg oral tablet: 1 tab(s) orally once a day  calcitriol 0.25 mcg oral capsule: 1 cap(s) orally once a day  Lantus Solostar Pen 100 units/mL subcutaneous solution: 10 unit(s) subcutaneous once a day (at bedtime) 10 unit(s) subcutaneous once a day  senna leaf extract oral tablet: 2 tab(s) orally once a day (at bedtime)   calcitriol 0.25 mcg oral capsule: 1 cap(s) orally once a day  insulin glargine 100 units/mL subcutaneous solution: 22 unit(s) subcutaneous once a day (at bedtime)  losartan 25 mg oral tablet: 1 tab(s) orally once a day  NIFEdipine 90 mg oral tablet, extended release: 1 tab(s) orally once a day  senna leaf extract oral tablet: 2 tab(s) orally once a day (at bedtime)

## 2025-07-08 NOTE — DISCHARGE NOTE PROVIDER - NSDCFUADDAPPT_GEN_ALL_CORE_FT
APPTS ARE READY TO BE MADE: [X] YES    Best Family or Patient Contact (if needed):    Additional Information about above appointments (if needed):    1: endo  2: pcp  3:     Other comments or requests:    APPTS ARE READY TO BE MADE: [X] YES    Best Family or Patient Contact (if needed):    Additional Information about above appointments (if needed):    1: endo  2: pcp  3:     Other comments or requests:   Patient was outreached but did not answer. A voicemail was left for the patient to return our call. APPTS ARE READY TO BE MADE: [X] YES    Best Family or Patient Contact (if needed):    Additional Information about above appointments (if needed):    1: endo  2: pcp  3:     Other comments or requests:     Patient was outreached but did not answer. A voicemail was left for the patient to return our call.  Patient was outreached but did not answer. A voicemail was left for the patient to return our call.

## 2025-07-08 NOTE — PROGRESS NOTE ADULT - ASSESSMENT
24 -year-old female    h/o  type 1 DM, , ESRD on HD  m/w.f, , HTN        AVF.  distal  left arm,,  Permcath , R ant chest  wall         c/o   epigastric pain, n/v    for 1 day. , has improved     CT,  no pe, ground glass  opacities       per  er  note, pt was  hypoxic  on   4 to 5 L   n/c,,  and in  er, had   recvd    receive 1 L of fluid     acute   diastolic chf/  fluid overboard        cxr, opeacities.  c/w  chf.  need s hd  fluid  removal   CKD, on H       has  Permcath   HTN   DM, type 1    with  h/o  hypoglycemia  in past/  does not use   her  insulin  pump    pt  not very  diligent with he r  lifestyle ,  has  varying fs.  hyper  and hypogycemia/  does  not  f/p  with endo    need  to r/o  dka. and  beta  hydroxy  butytrate. /pending    anion   gap  12.  hco3  is  29     in er,  with hyperglycemia  on arrival,  about  500 on arrival,  and  now  fs is  176    on   lantus/  admelog.  increased    insulin,  a per  endo dr ledesma  HTN      bp  noted.  and  Procardia was   stopped,     on  toprl,  doing  better    plan  d/c  in am, after HD    dvt  ppx/  spoke    with hcp. nurse   encounter   time   35   minutes     rad< from: CT Angio Chest PE Protocol w/ IV Cont (07.05.25 @ 21:43) >  MPRESSION:  No acute pulmonary embolus.  Diffuse bilateral groundglass alveolar opacities with confluent   groundglass opacities in the upper lobes. Differential includes edema,   infection, pneumonitis, and ARDS.  No acute abdominal pathology.  --- End of Report ---                24 -year-old female    h/o  type 1 DM, , ESRD on HD  m/w.f, , HTN        AVF.  distal  left arm,,  Permcath , R ant chest  wall         c/o   epigastric pain, n/v    for 1 day. , has improved     CT,  no pe, ground glass  opacities       per  er  note, pt was  hypoxic  on   4 to 5 L   n/c,,  and in  er, had   recvd    receive 1 L of fluid     acute   diastolic chf/  fluid overboard       cxr, opacities ,   c/w  chf.  needs   hd  fluid  removal   CKD, on H       has  Permcath   HTN   DM, type 1    with  h/o  hypoglycemia  in past/  does not use   her  insulin  pump    pt  not very  diligent with he r  lifestyle ,  has  varying fs.  hyper  and hypogycemia/  does  not  f/p  with endo     had  mild  dka.  ha s improved    anion   gap  12.  hco3  is  29     in er,  with hyperglycemia  on arrival,  about  500 on arrival,  and  now  fs is  176    on   lantus/  admelog.  increased    insulin,  a per  endo dr ledesma  HTN      bp  noted.  and  Procardia was   stopped,     on  toprl,  doing  better    plan  d/c  in am, after HD/  hcp  will  provide  transport    dvt  ppx/  spoke    with hcp. nurse    care team updated    encounter   time   35   minutes     rad< from: CT Angio Chest PE Protocol w/ IV Cont (07.05.25 @ 21:43) >  MPRESSION:  No acute pulmonary embolus.  Diffuse bilateral groundglass alveolar opacities with confluent   groundglass opacities in the upper lobes. Differential includes edema,   infection, pneumonitis, and ARDS.  No acute abdominal pathology.  --- End of Report ---

## 2025-07-09 ENCOUNTER — TRANSCRIPTION ENCOUNTER (OUTPATIENT)
Age: 25
End: 2025-07-09

## 2025-07-09 VITALS — DIASTOLIC BLOOD PRESSURE: 98 MMHG | SYSTOLIC BLOOD PRESSURE: 137 MMHG | HEART RATE: 90 BPM

## 2025-07-09 LAB
GLUCOSE BLDC GLUCOMTR-MCNC: 127 MG/DL — HIGH (ref 70–99)
GLUCOSE BLDC GLUCOMTR-MCNC: 182 MG/DL — HIGH (ref 70–99)

## 2025-07-09 PROCEDURE — 99239 HOSP IP/OBS DSCHRG MGMT >30: CPT

## 2025-07-09 RX ORDER — NIFEDIPINE 30 MG
1 TABLET, EXTENDED RELEASE 24 HR ORAL
Qty: 30 | Refills: 0
Start: 2025-07-09 | End: 2025-08-07

## 2025-07-09 RX ORDER — LOSARTAN POTASSIUM 100 MG/1
1 TABLET, FILM COATED ORAL
Qty: 30 | Refills: 0
Start: 2025-07-09 | End: 2025-08-07

## 2025-07-09 RX ORDER — LOSARTAN POTASSIUM 100 MG/1
25 TABLET, FILM COATED ORAL DAILY
Refills: 0 | Status: DISCONTINUED | OUTPATIENT
Start: 2025-07-09 | End: 2025-07-09

## 2025-07-09 RX ORDER — INSULIN GLARGINE-YFGN 100 [IU]/ML
22 INJECTION, SOLUTION SUBCUTANEOUS
Qty: 0 | Refills: 0 | DISCHARGE
Start: 2025-07-09

## 2025-07-09 RX ORDER — NIFEDIPINE 30 MG
90 TABLET, EXTENDED RELEASE 24 HR ORAL DAILY
Refills: 0 | Status: DISCONTINUED | OUTPATIENT
Start: 2025-07-09 | End: 2025-07-09

## 2025-07-09 RX ORDER — METOPROLOL SUCCINATE 50 MG/1
5 TABLET, EXTENDED RELEASE ORAL ONCE
Refills: 0 | Status: COMPLETED | OUTPATIENT
Start: 2025-07-09 | End: 2025-07-09

## 2025-07-09 RX ADMIN — METOPROLOL SUCCINATE 5 MILLIGRAM(S): 50 TABLET, EXTENDED RELEASE ORAL at 00:40

## 2025-07-09 RX ADMIN — Medication 1 APPLICATION(S): at 15:07

## 2025-07-09 RX ADMIN — METOPROLOL SUCCINATE 25 MILLIGRAM(S): 50 TABLET, EXTENDED RELEASE ORAL at 05:23

## 2025-07-09 RX ADMIN — INSULIN LISPRO 1: 100 INJECTION, SOLUTION INTRAVENOUS; SUBCUTANEOUS at 08:14

## 2025-07-09 RX ADMIN — Medication 90 MILLIGRAM(S): at 10:06

## 2025-07-09 RX ADMIN — CALCITRIOL 0.25 MICROGRAM(S): 0.5 CAPSULE, GELATIN COATED ORAL at 15:02

## 2025-07-09 RX ADMIN — LOSARTAN POTASSIUM 25 MILLIGRAM(S): 100 TABLET, FILM COATED ORAL at 15:02

## 2025-07-09 RX ADMIN — INSULIN LISPRO 6 UNIT(S): 100 INJECTION, SOLUTION INTRAVENOUS; SUBCUTANEOUS at 15:01

## 2025-07-09 RX ADMIN — INSULIN LISPRO 6 UNIT(S): 100 INJECTION, SOLUTION INTRAVENOUS; SUBCUTANEOUS at 08:14

## 2025-07-09 NOTE — DISCHARGE NOTE NURSING/CASE MANAGEMENT/SOCIAL WORK - NSTRANSFERBELONGINGSRESP_GEN_A_NUR
Orders Placed This Encounter   Medications    escitalopram (LEXAPRO) 10 MG tablet     Sig: Take 1 tablet by mouth daily     Dispense:  30 tablet     Refill:  0       Patient given educational materials - seepatient instructions.  Discussed use, benefit, and side effects of prescribed medications.All patient questions answered.  Pt voiced understanding. Reviewed health maintenance.Instructed to continue current medications, diet and exercise.  Patient agreedwith treatment plan. Follow up as directed.      Electronically signed by GLENN Allison CNP on 3/18/2024at 1:43 PM          Tobacco Cessation Counseling: Patient advised about behavior change, including information about personal health harms, usage of appropriate cessation measures and benefits of cessation.  Time spent (minutes): 3-10 min       
yes

## 2025-07-09 NOTE — DISCHARGE NOTE NURSING/CASE MANAGEMENT/SOCIAL WORK - FINANCIAL ASSISTANCE
[facility] provides services at a reduced cost to those who are determined to be eligible through [facility]’s financial assistance program. Information regarding [facility]’s financial assistance program can be found by going to [email or web address] or by calling [phone].

## 2025-07-09 NOTE — DISCHARGE NOTE NURSING/CASE MANAGEMENT/SOCIAL WORK - PATIENT PORTAL LINK FT
You can access the FollowMyHealth Patient Portal offered by [facility] by registering at the following website: [email or web address]. By joining [facility]’s FollowMyHealth portal, you will also be able to view your health information using other applications (apps) compatible with our system.

## 2025-07-09 NOTE — DISCHARGE NOTE NURSING/CASE MANAGEMENT/SOCIAL WORK - NSDCFUADDAPPT_GEN_ALL_CORE_FT
APPTS ARE READY TO BE MADE: [X] YES    Best Family or Patient Contact (if needed):    Additional Information about above appointments (if needed):    1: endo  2: pcp  3:     Other comments or requests:

## 2025-07-09 NOTE — PHARMACOTHERAPY INTERVENTION NOTE - COMMENTS
Recommended CHG bath initiation since patient receives hemodialysis.
As per policy, adjusted initial dose ceftriaxone IVPB to be given IVPush in ER

## 2025-07-09 NOTE — PROGRESS NOTE ADULT - PROVIDER SPECIALTY LIST ADULT
Endocrinology
Internal Medicine
Nephrology
Endocrinology
Internal Medicine
Endocrinology

## 2025-07-10 LAB
CULTURE RESULTS: SIGNIFICANT CHANGE UP
CULTURE RESULTS: SIGNIFICANT CHANGE UP
SPECIMEN SOURCE: SIGNIFICANT CHANGE UP
SPECIMEN SOURCE: SIGNIFICANT CHANGE UP

## 2025-07-15 DIAGNOSIS — E10.10 TYPE 1 DIABETES MELLITUS WITH KETOACIDOSIS WITHOUT COMA: ICD-10-CM

## 2025-07-15 DIAGNOSIS — E10.22 TYPE 1 DIABETES MELLITUS WITH DIABETIC CHRONIC KIDNEY DISEASE: ICD-10-CM

## 2025-07-15 DIAGNOSIS — Z99.2 DEPENDENCE ON RENAL DIALYSIS: ICD-10-CM

## 2025-07-15 DIAGNOSIS — I16.0 HYPERTENSIVE URGENCY: ICD-10-CM

## 2025-07-15 DIAGNOSIS — E87.70 FLUID OVERLOAD, UNSPECIFIED: ICD-10-CM

## 2025-07-15 DIAGNOSIS — N18.6 END STAGE RENAL DISEASE: ICD-10-CM

## 2025-07-15 DIAGNOSIS — I50.31 ACUTE DIASTOLIC (CONGESTIVE) HEART FAILURE: ICD-10-CM

## 2025-07-15 DIAGNOSIS — I13.2 HYPERTENSIVE HEART AND CHRONIC KIDNEY DISEASE WITH HEART FAILURE AND WITH STAGE 5 CHRONIC KIDNEY DISEASE, OR END STAGE RENAL DISEASE: ICD-10-CM

## 2025-08-22 ENCOUNTER — INPATIENT (INPATIENT)
Facility: HOSPITAL | Age: 25
LOS: 7 days | Discharge: LEFT BEFORE DISCHARGE | End: 2025-08-30
Attending: INTERNAL MEDICINE | Admitting: INTERNAL MEDICINE
Payer: COMMERCIAL

## 2025-08-22 VITALS
WEIGHT: 119.93 LBS | OXYGEN SATURATION: 99 % | HEART RATE: 120 BPM | TEMPERATURE: 101 F | SYSTOLIC BLOOD PRESSURE: 170 MMHG | RESPIRATION RATE: 18 BRPM | DIASTOLIC BLOOD PRESSURE: 108 MMHG | HEIGHT: 62 IN

## 2025-08-22 LAB
ALBUMIN SERPL ELPH-MCNC: 3.6 G/DL — SIGNIFICANT CHANGE UP (ref 3.3–5)
ALP SERPL-CCNC: 119 U/L — SIGNIFICANT CHANGE UP (ref 40–120)
ALT FLD-CCNC: 20 U/L — SIGNIFICANT CHANGE UP (ref 12–78)
ANION GAP SERPL CALC-SCNC: 15 MMOL/L — SIGNIFICANT CHANGE UP (ref 5–17)
AST SERPL-CCNC: 25 U/L — SIGNIFICANT CHANGE UP (ref 15–37)
BASOPHILS # BLD AUTO: 0.02 K/UL — SIGNIFICANT CHANGE UP (ref 0–0.2)
BASOPHILS NFR BLD AUTO: 0.3 % — SIGNIFICANT CHANGE UP (ref 0–2)
BILIRUB SERPL-MCNC: 0.9 MG/DL — SIGNIFICANT CHANGE UP (ref 0.2–1.2)
BUN SERPL-MCNC: 27 MG/DL — HIGH (ref 7–23)
CALCIUM SERPL-MCNC: 8.6 MG/DL — SIGNIFICANT CHANGE UP (ref 8.5–10.1)
CHLORIDE SERPL-SCNC: 101 MMOL/L — SIGNIFICANT CHANGE UP (ref 96–108)
CO2 SERPL-SCNC: 24 MMOL/L — SIGNIFICANT CHANGE UP (ref 22–31)
CREAT SERPL-MCNC: 5.36 MG/DL — HIGH (ref 0.5–1.3)
EGFR: 11 ML/MIN/1.73M2 — LOW
EGFR: 11 ML/MIN/1.73M2 — LOW
EOSINOPHIL # BLD AUTO: 0 K/UL — SIGNIFICANT CHANGE UP (ref 0–0.5)
EOSINOPHIL NFR BLD AUTO: 0 % — SIGNIFICANT CHANGE UP (ref 0–6)
FLUAV AG NPH QL: SIGNIFICANT CHANGE UP
FLUBV AG NPH QL: SIGNIFICANT CHANGE UP
GLUCOSE SERPL-MCNC: 215 MG/DL — HIGH (ref 70–99)
HCG SERPL-ACNC: <1 MIU/ML — SIGNIFICANT CHANGE UP
HCT VFR BLD CALC: 30.4 % — LOW (ref 34.5–45)
HGB BLD-MCNC: 10.4 G/DL — LOW (ref 11.5–15.5)
IMM GRANULOCYTES NFR BLD AUTO: 0.3 % — SIGNIFICANT CHANGE UP (ref 0–0.9)
LIDOCAIN IGE QN: 44 U/L — SIGNIFICANT CHANGE UP (ref 13–75)
LYMPHOCYTES # BLD AUTO: 0.23 K/UL — LOW (ref 1–3.3)
LYMPHOCYTES # BLD AUTO: 3.6 % — LOW (ref 13–44)
MCHC RBC-ENTMCNC: 30.4 PG — SIGNIFICANT CHANGE UP (ref 27–34)
MCHC RBC-ENTMCNC: 34.2 G/DL — SIGNIFICANT CHANGE UP (ref 32–36)
MCV RBC AUTO: 88.9 FL — SIGNIFICANT CHANGE UP (ref 80–100)
MONOCYTES # BLD AUTO: 0.41 K/UL — SIGNIFICANT CHANGE UP (ref 0–0.9)
MONOCYTES NFR BLD AUTO: 6.5 % — SIGNIFICANT CHANGE UP (ref 2–14)
NEUTROPHILS # BLD AUTO: 5.63 K/UL — SIGNIFICANT CHANGE UP (ref 1.8–7.4)
NEUTROPHILS NFR BLD AUTO: 89.3 % — HIGH (ref 43–77)
NRBC BLD AUTO-RTO: 0 /100 WBCS — SIGNIFICANT CHANGE UP (ref 0–0)
PLATELET # BLD AUTO: 220 K/UL — SIGNIFICANT CHANGE UP (ref 150–400)
POTASSIUM SERPL-MCNC: 3.7 MMOL/L — SIGNIFICANT CHANGE UP (ref 3.5–5.3)
POTASSIUM SERPL-SCNC: 3.7 MMOL/L — SIGNIFICANT CHANGE UP (ref 3.5–5.3)
PROT SERPL-MCNC: 7.6 GM/DL — SIGNIFICANT CHANGE UP (ref 6–8.3)
RBC # BLD: 3.42 M/UL — LOW (ref 3.8–5.2)
RBC # FLD: 14 % — SIGNIFICANT CHANGE UP (ref 10.3–14.5)
RSV RNA NPH QL NAA+NON-PROBE: SIGNIFICANT CHANGE UP
SARS-COV-2 RNA SPEC QL NAA+PROBE: DETECTED
SODIUM SERPL-SCNC: 140 MMOL/L — SIGNIFICANT CHANGE UP (ref 135–145)
SOURCE RESPIRATORY: SIGNIFICANT CHANGE UP
WBC # BLD: 6.31 K/UL — SIGNIFICANT CHANGE UP (ref 3.8–10.5)
WBC # FLD AUTO: 6.31 K/UL — SIGNIFICANT CHANGE UP (ref 3.8–10.5)

## 2025-08-22 PROCEDURE — 99285 EMERGENCY DEPT VISIT HI MDM: CPT

## 2025-08-22 PROCEDURE — 74176 CT ABD & PELVIS W/O CONTRAST: CPT | Mod: 26

## 2025-08-22 PROCEDURE — 71045 X-RAY EXAM CHEST 1 VIEW: CPT | Mod: 26

## 2025-08-22 RX ORDER — ONDANSETRON HCL/PF 4 MG/2 ML
4 VIAL (ML) INJECTION EVERY 8 HOURS
Refills: 0 | Status: DISCONTINUED | OUTPATIENT
Start: 2025-08-22 | End: 2025-08-28

## 2025-08-22 RX ORDER — NIFEDIPINE 30 MG
90 TABLET, EXTENDED RELEASE 24 HR ORAL DAILY
Refills: 0 | Status: DISCONTINUED | OUTPATIENT
Start: 2025-08-22 | End: 2025-08-30

## 2025-08-22 RX ORDER — GLUCAGON 3 MG/1
1 POWDER NASAL ONCE
Refills: 0 | Status: DISCONTINUED | OUTPATIENT
Start: 2025-08-22 | End: 2025-08-24

## 2025-08-22 RX ORDER — ACETAMINOPHEN 500 MG/5ML
650 LIQUID (ML) ORAL EVERY 6 HOURS
Refills: 0 | Status: DISCONTINUED | OUTPATIENT
Start: 2025-08-22 | End: 2025-08-30

## 2025-08-22 RX ORDER — SODIUM CHLORIDE 9 G/1000ML
1000 INJECTION, SOLUTION INTRAVENOUS
Refills: 0 | Status: DISCONTINUED | OUTPATIENT
Start: 2025-08-22 | End: 2025-08-24

## 2025-08-22 RX ORDER — MELATONIN 5 MG
3 TABLET ORAL AT BEDTIME
Refills: 0 | Status: DISCONTINUED | OUTPATIENT
Start: 2025-08-22 | End: 2025-08-30

## 2025-08-22 RX ORDER — LABETALOL HYDROCHLORIDE 200 MG/1
10 TABLET, FILM COATED ORAL ONCE
Refills: 0 | Status: COMPLETED | OUTPATIENT
Start: 2025-08-22 | End: 2025-08-22

## 2025-08-22 RX ORDER — DEXTROSE 50 % IN WATER 50 %
25 SYRINGE (ML) INTRAVENOUS ONCE
Refills: 0 | Status: DISCONTINUED | OUTPATIENT
Start: 2025-08-22 | End: 2025-08-24

## 2025-08-22 RX ORDER — DEXTROSE 50 % IN WATER 50 %
12.5 SYRINGE (ML) INTRAVENOUS ONCE
Refills: 0 | Status: DISCONTINUED | OUTPATIENT
Start: 2025-08-22 | End: 2025-08-24

## 2025-08-22 RX ORDER — INSULIN LISPRO 100 U/ML
INJECTION, SOLUTION INTRAVENOUS; SUBCUTANEOUS
Refills: 0 | Status: DISCONTINUED | OUTPATIENT
Start: 2025-08-22 | End: 2025-08-24

## 2025-08-22 RX ORDER — ONDANSETRON HCL/PF 4 MG/2 ML
4 VIAL (ML) INJECTION ONCE
Refills: 0 | Status: COMPLETED | OUTPATIENT
Start: 2025-08-22 | End: 2025-08-22

## 2025-08-22 RX ORDER — DEXTROSE 50 % IN WATER 50 %
15 SYRINGE (ML) INTRAVENOUS ONCE
Refills: 0 | Status: DISCONTINUED | OUTPATIENT
Start: 2025-08-22 | End: 2025-08-24

## 2025-08-22 RX ORDER — ACETAMINOPHEN 500 MG/5ML
1000 LIQUID (ML) ORAL ONCE
Refills: 0 | Status: COMPLETED | OUTPATIENT
Start: 2025-08-22 | End: 2025-08-22

## 2025-08-22 RX ORDER — INSULIN GLARGINE-YFGN 100 [IU]/ML
22 INJECTION, SOLUTION SUBCUTANEOUS AT BEDTIME
Refills: 0 | Status: DISCONTINUED | OUTPATIENT
Start: 2025-08-23 | End: 2025-08-25

## 2025-08-22 RX ORDER — SENNA 187 MG
2 TABLET ORAL AT BEDTIME
Refills: 0 | Status: DISCONTINUED | OUTPATIENT
Start: 2025-08-22 | End: 2025-08-30

## 2025-08-22 RX ORDER — MAGNESIUM, ALUMINUM HYDROXIDE 200-200 MG
30 TABLET,CHEWABLE ORAL EVERY 4 HOURS
Refills: 0 | Status: DISCONTINUED | OUTPATIENT
Start: 2025-08-22 | End: 2025-08-30

## 2025-08-22 RX ORDER — CALCITRIOL 0.5 UG/1
0.25 CAPSULE, GELATIN COATED ORAL DAILY
Refills: 0 | Status: DISCONTINUED | OUTPATIENT
Start: 2025-08-22 | End: 2025-08-29

## 2025-08-22 RX ORDER — METOCLOPRAMIDE HCL 10 MG
10 TABLET ORAL ONCE
Refills: 0 | Status: COMPLETED | OUTPATIENT
Start: 2025-08-22 | End: 2025-08-22

## 2025-08-22 RX ORDER — INSULIN LISPRO 100 U/ML
INJECTION, SOLUTION INTRAVENOUS; SUBCUTANEOUS AT BEDTIME
Refills: 0 | Status: DISCONTINUED | OUTPATIENT
Start: 2025-08-22 | End: 2025-08-24

## 2025-08-22 RX ORDER — LOSARTAN POTASSIUM 100 MG/1
25 TABLET, FILM COATED ORAL DAILY
Refills: 0 | Status: DISCONTINUED | OUTPATIENT
Start: 2025-08-22 | End: 2025-08-30

## 2025-08-22 RX ORDER — DIPHENHYDRAMINE HCL 12.5MG/5ML
25 ELIXIR ORAL ONCE
Refills: 0 | Status: COMPLETED | OUTPATIENT
Start: 2025-08-22 | End: 2025-08-22

## 2025-08-22 RX ADMIN — Medication 10 MILLIGRAM(S): at 22:06

## 2025-08-22 RX ADMIN — Medication 2 MILLIGRAM(S): at 19:54

## 2025-08-22 RX ADMIN — Medication 25 MILLIGRAM(S): at 22:06

## 2025-08-22 RX ADMIN — Medication 4 MILLIGRAM(S): at 19:55

## 2025-08-22 RX ADMIN — Medication 400 MILLIGRAM(S): at 19:06

## 2025-08-22 RX ADMIN — Medication 10 MILLIGRAM(S): at 22:05

## 2025-08-22 RX ADMIN — LABETALOL HYDROCHLORIDE 10 MILLIGRAM(S): 200 TABLET, FILM COATED ORAL at 23:42

## 2025-08-23 DIAGNOSIS — N18.6 END STAGE RENAL DISEASE: ICD-10-CM

## 2025-08-23 DIAGNOSIS — U07.1 COVID-19: ICD-10-CM

## 2025-08-23 DIAGNOSIS — Z95.828 PRESENCE OF OTHER VASCULAR IMPLANTS AND GRAFTS: Chronic | ICD-10-CM

## 2025-08-23 DIAGNOSIS — Z98.890 OTHER SPECIFIED POSTPROCEDURAL STATES: Chronic | ICD-10-CM

## 2025-08-23 DIAGNOSIS — I16.0 HYPERTENSIVE URGENCY: ICD-10-CM

## 2025-08-23 DIAGNOSIS — E10.65 TYPE 1 DIABETES MELLITUS WITH HYPERGLYCEMIA: ICD-10-CM

## 2025-08-23 DIAGNOSIS — J96.01 ACUTE RESPIRATORY FAILURE WITH HYPOXIA: ICD-10-CM

## 2025-08-23 LAB
A1C WITH ESTIMATED AVERAGE GLUCOSE RESULT: 7.4 % — HIGH (ref 4–5.6)
ALBUMIN SERPL ELPH-MCNC: 3.1 G/DL — LOW (ref 3.3–5)
ALP SERPL-CCNC: 126 U/L — HIGH (ref 40–120)
ALT FLD-CCNC: 20 U/L — SIGNIFICANT CHANGE UP (ref 12–78)
ANION GAP SERPL CALC-SCNC: 13 MMOL/L — SIGNIFICANT CHANGE UP (ref 5–17)
AST SERPL-CCNC: 21 U/L — SIGNIFICANT CHANGE UP (ref 15–37)
BASE EXCESS BLDA CALC-SCNC: 2.6 MMOL/L — SIGNIFICANT CHANGE UP (ref -2–3)
BILIRUB SERPL-MCNC: 0.5 MG/DL — SIGNIFICANT CHANGE UP (ref 0.2–1.2)
BLOOD GAS COMMENTS ARTERIAL: SIGNIFICANT CHANGE UP
BUN SERPL-MCNC: 41 MG/DL — HIGH (ref 7–23)
CALCIUM SERPL-MCNC: 8.1 MG/DL — LOW (ref 8.5–10.1)
CHLORIDE SERPL-SCNC: 99 MMOL/L — SIGNIFICANT CHANGE UP (ref 96–108)
CHOLEST SERPL-MCNC: 202 MG/DL — HIGH
CO2 BLDA-SCNC: 28 MMOL/L — HIGH (ref 19–24)
CO2 SERPL-SCNC: 25 MMOL/L — SIGNIFICANT CHANGE UP (ref 22–31)
CREAT SERPL-MCNC: 7.4 MG/DL — HIGH (ref 0.5–1.3)
EGFR: 7 ML/MIN/1.73M2 — LOW
EGFR: 7 ML/MIN/1.73M2 — LOW
ESTIMATED AVERAGE GLUCOSE: 166 MG/DL — HIGH (ref 68–114)
GAS PNL BLDA: SIGNIFICANT CHANGE UP
GLUCOSE BLDC GLUCOMTR-MCNC: 174 MG/DL — HIGH (ref 70–99)
GLUCOSE BLDC GLUCOMTR-MCNC: 244 MG/DL — HIGH (ref 70–99)
GLUCOSE BLDC GLUCOMTR-MCNC: 278 MG/DL — HIGH (ref 70–99)
GLUCOSE BLDC GLUCOMTR-MCNC: 311 MG/DL — HIGH (ref 70–99)
GLUCOSE BLDC GLUCOMTR-MCNC: 314 MG/DL — HIGH (ref 70–99)
GLUCOSE SERPL-MCNC: 340 MG/DL — HIGH (ref 70–99)
HCG SERPL-ACNC: <1 MIU/ML — SIGNIFICANT CHANGE UP
HCO3 BLDA-SCNC: 27 MMOL/L — SIGNIFICANT CHANGE UP (ref 21–28)
HDLC SERPL-MCNC: 59 MG/DL — SIGNIFICANT CHANGE UP
HOROWITZ INDEX BLDA+IHG-RTO: 100 — SIGNIFICANT CHANGE UP
LDLC SERPL-MCNC: 129 MG/DL — HIGH
LIPID PNL WITH DIRECT LDL SERPL: 129 MG/DL — HIGH
NONHDLC SERPL-MCNC: 143 MG/DL — HIGH
NT-PROBNP SERPL-SCNC: HIGH PG/ML (ref 0–125)
PCO2 BLDA: 41 MMHG — SIGNIFICANT CHANGE UP (ref 32–46)
PH BLDA: 7.43 — SIGNIFICANT CHANGE UP (ref 7.35–7.45)
PHOSPHATE SERPL-MCNC: 7.6 MG/DL — HIGH (ref 2.5–4.5)
PO2 BLDA: 64 MMHG — LOW (ref 83–108)
POTASSIUM SERPL-MCNC: 4.8 MMOL/L — SIGNIFICANT CHANGE UP (ref 3.5–5.3)
POTASSIUM SERPL-SCNC: 4.8 MMOL/L — SIGNIFICANT CHANGE UP (ref 3.5–5.3)
PROCALCITONIN SERPL-MCNC: 1.21 NG/ML — HIGH (ref 0.02–0.1)
PROT SERPL-MCNC: 7.2 GM/DL — SIGNIFICANT CHANGE UP (ref 6–8.3)
SAO2 % BLDA: 94.2 % — SIGNIFICANT CHANGE UP (ref 94–98)
SODIUM SERPL-SCNC: 137 MMOL/L — SIGNIFICANT CHANGE UP (ref 135–145)
TRIGL SERPL-MCNC: 80 MG/DL — SIGNIFICANT CHANGE UP
TSH SERPL-MCNC: 1.49 UU/ML — SIGNIFICANT CHANGE UP (ref 0.36–3.74)

## 2025-08-23 PROCEDURE — G0545: CPT

## 2025-08-23 PROCEDURE — 99222 1ST HOSP IP/OBS MODERATE 55: CPT

## 2025-08-23 PROCEDURE — 71045 X-RAY EXAM CHEST 1 VIEW: CPT | Mod: 26

## 2025-08-23 PROCEDURE — 99223 1ST HOSP IP/OBS HIGH 75: CPT

## 2025-08-23 RX ORDER — LABETALOL HYDROCHLORIDE 200 MG/1
20 TABLET, FILM COATED ORAL ONCE
Refills: 0 | Status: COMPLETED | OUTPATIENT
Start: 2025-08-23 | End: 2025-08-23

## 2025-08-23 RX ORDER — METOCLOPRAMIDE HCL 10 MG
10 TABLET ORAL ONCE
Refills: 0 | Status: COMPLETED | OUTPATIENT
Start: 2025-08-23 | End: 2025-08-23

## 2025-08-23 RX ORDER — HYDROMORPHONE/SOD CHLOR,ISO/PF 2 MG/10 ML
0.5 SYRINGE (ML) INJECTION ONCE
Refills: 0 | Status: DISCONTINUED | OUTPATIENT
Start: 2025-08-23 | End: 2025-08-23

## 2025-08-23 RX ORDER — REMDESIVIR 5 MG/ML
200 INJECTION INTRAVENOUS ONCE
Refills: 0 | Status: COMPLETED | OUTPATIENT
Start: 2025-08-23 | End: 2025-08-23

## 2025-08-23 RX ORDER — LABETALOL HYDROCHLORIDE 200 MG/1
10 TABLET, FILM COATED ORAL ONCE
Refills: 0 | Status: COMPLETED | OUTPATIENT
Start: 2025-08-23 | End: 2025-08-23

## 2025-08-23 RX ORDER — DEXAMETHASONE 0.5 MG/1
6 TABLET ORAL DAILY
Refills: 0 | Status: DISCONTINUED | OUTPATIENT
Start: 2025-08-23 | End: 2025-08-30

## 2025-08-23 RX ORDER — ACETAMINOPHEN 500 MG/5ML
1000 LIQUID (ML) ORAL ONCE
Refills: 0 | Status: COMPLETED | OUTPATIENT
Start: 2025-08-23 | End: 2025-08-23

## 2025-08-23 RX ORDER — REMDESIVIR 5 MG/ML
INJECTION INTRAVENOUS
Refills: 0 | Status: DISCONTINUED | OUTPATIENT
Start: 2025-08-23 | End: 2025-08-23

## 2025-08-23 RX ORDER — AZITHROMYCIN 250 MG
500 CAPSULE ORAL EVERY 24 HOURS
Refills: 0 | Status: DISCONTINUED | OUTPATIENT
Start: 2025-08-23 | End: 2025-08-30

## 2025-08-23 RX ORDER — SODIUM CHLORIDE 9 G/1000ML
500 INJECTION, SOLUTION INTRAVENOUS ONCE
Refills: 0 | Status: DISCONTINUED | OUTPATIENT
Start: 2025-08-23 | End: 2025-08-23

## 2025-08-23 RX ORDER — SODIUM CHLORIDE 9 G/1000ML
1000 INJECTION, SOLUTION INTRAVENOUS
Refills: 0 | Status: DISCONTINUED | OUTPATIENT
Start: 2025-08-23 | End: 2025-08-23

## 2025-08-23 RX ORDER — SODIUM CHLORIDE 9 G/1000ML
1000 INJECTION, SOLUTION INTRAVENOUS
Refills: 0 | Status: DISCONTINUED | OUTPATIENT
Start: 2025-08-23 | End: 2025-08-24

## 2025-08-23 RX ADMIN — Medication 0.5 MILLIGRAM(S): at 01:57

## 2025-08-23 RX ADMIN — CALCITRIOL 0.25 MICROGRAM(S): 0.5 CAPSULE, GELATIN COATED ORAL at 12:09

## 2025-08-23 RX ADMIN — LABETALOL HYDROCHLORIDE 20 MILLIGRAM(S): 200 TABLET, FILM COATED ORAL at 01:42

## 2025-08-23 RX ADMIN — LOSARTAN POTASSIUM 25 MILLIGRAM(S): 100 TABLET, FILM COATED ORAL at 06:38

## 2025-08-23 RX ADMIN — Medication 90 MILLIGRAM(S): at 06:38

## 2025-08-23 RX ADMIN — INSULIN LISPRO 4: 100 INJECTION, SOLUTION INTRAVENOUS; SUBCUTANEOUS at 06:36

## 2025-08-23 RX ADMIN — INSULIN LISPRO 2: 100 INJECTION, SOLUTION INTRAVENOUS; SUBCUTANEOUS at 01:06

## 2025-08-23 RX ADMIN — Medication 0.5 MILLIGRAM(S): at 23:10

## 2025-08-23 RX ADMIN — DEXAMETHASONE 6 MILLIGRAM(S): 0.5 TABLET ORAL at 01:56

## 2025-08-23 RX ADMIN — Medication 4 MILLIGRAM(S): at 01:56

## 2025-08-23 RX ADMIN — REMDESIVIR 200 MILLIGRAM(S): 5 INJECTION INTRAVENOUS at 02:39

## 2025-08-23 RX ADMIN — Medication 1 MILLIGRAM(S): at 17:25

## 2025-08-23 RX ADMIN — INSULIN LISPRO 1: 100 INJECTION, SOLUTION INTRAVENOUS; SUBCUTANEOUS at 16:58

## 2025-08-23 RX ADMIN — INSULIN GLARGINE-YFGN 22 UNIT(S): 100 INJECTION, SOLUTION SUBCUTANEOUS at 23:56

## 2025-08-23 RX ADMIN — LABETALOL HYDROCHLORIDE 10 MILLIGRAM(S): 200 TABLET, FILM COATED ORAL at 00:36

## 2025-08-23 RX ADMIN — INSULIN LISPRO 3: 100 INJECTION, SOLUTION INTRAVENOUS; SUBCUTANEOUS at 12:08

## 2025-08-23 RX ADMIN — Medication 10 MILLIGRAM(S): at 23:09

## 2025-08-23 RX ADMIN — Medication 400 MILLIGRAM(S): at 15:35

## 2025-08-23 RX ADMIN — Medication 1000 MILLIGRAM(S): at 17:34

## 2025-08-23 RX ADMIN — Medication 4 MILLIGRAM(S): at 15:35

## 2025-08-24 LAB
ALBUMIN SERPL ELPH-MCNC: 3.1 G/DL — LOW (ref 3.3–5)
ALP SERPL-CCNC: 133 U/L — HIGH (ref 40–120)
ALT FLD-CCNC: 22 U/L — SIGNIFICANT CHANGE UP (ref 12–78)
ANION GAP SERPL CALC-SCNC: 12 MMOL/L — SIGNIFICANT CHANGE UP (ref 5–17)
AST SERPL-CCNC: 35 U/L — SIGNIFICANT CHANGE UP (ref 15–37)
BASE EXCESS BLDA CALC-SCNC: 2 MMOL/L — SIGNIFICANT CHANGE UP (ref -2–3)
BASE EXCESS BLDA CALC-SCNC: 2 MMOL/L — SIGNIFICANT CHANGE UP (ref -2–3)
BILIRUB SERPL-MCNC: 0.5 MG/DL — SIGNIFICANT CHANGE UP (ref 0.2–1.2)
BLD GP AB SCN SERPL QL: SIGNIFICANT CHANGE UP
BLOOD GAS COMMENTS ARTERIAL: SIGNIFICANT CHANGE UP
BLOOD GAS COMMENTS ARTERIAL: SIGNIFICANT CHANGE UP
BUN SERPL-MCNC: 57 MG/DL — HIGH (ref 7–23)
CALCIUM SERPL-MCNC: 8.6 MG/DL — SIGNIFICANT CHANGE UP (ref 8.5–10.1)
CHLORIDE SERPL-SCNC: 96 MMOL/L — SIGNIFICANT CHANGE UP (ref 96–108)
CO2 BLDA-SCNC: 28 MMOL/L — HIGH (ref 19–24)
CO2 BLDA-SCNC: 28 MMOL/L — HIGH (ref 19–24)
CO2 SERPL-SCNC: 25 MMOL/L — SIGNIFICANT CHANGE UP (ref 22–31)
CREAT SERPL-MCNC: 8.84 MG/DL — HIGH (ref 0.5–1.3)
EGFR: 6 ML/MIN/1.73M2 — LOW
EGFR: 6 ML/MIN/1.73M2 — LOW
GAS PNL BLDA: SIGNIFICANT CHANGE UP
GLUCOSE BLDC GLUCOMTR-MCNC: 114 MG/DL — HIGH (ref 70–99)
GLUCOSE BLDC GLUCOMTR-MCNC: 147 MG/DL — HIGH (ref 70–99)
GLUCOSE BLDC GLUCOMTR-MCNC: 190 MG/DL — HIGH (ref 70–99)
GLUCOSE BLDC GLUCOMTR-MCNC: 275 MG/DL — HIGH (ref 70–99)
GLUCOSE BLDC GLUCOMTR-MCNC: 302 MG/DL — HIGH (ref 70–99)
GLUCOSE BLDC GLUCOMTR-MCNC: 56 MG/DL — LOW (ref 70–99)
GLUCOSE BLDC GLUCOMTR-MCNC: 61 MG/DL — LOW (ref 70–99)
GLUCOSE SERPL-MCNC: 295 MG/DL — HIGH (ref 70–99)
HCO3 BLDA-SCNC: 26 MMOL/L — SIGNIFICANT CHANGE UP (ref 21–28)
HCO3 BLDA-SCNC: 26 MMOL/L — SIGNIFICANT CHANGE UP (ref 21–28)
HCT VFR BLD CALC: 36.7 % — SIGNIFICANT CHANGE UP (ref 34.5–45)
HGB BLD-MCNC: 11.8 G/DL — SIGNIFICANT CHANGE UP (ref 11.5–15.5)
HOROWITZ INDEX BLDA+IHG-RTO: 100 — SIGNIFICANT CHANGE UP
HOROWITZ INDEX BLDA+IHG-RTO: SIGNIFICANT CHANGE UP
INR BLD: 1.14 RATIO — SIGNIFICANT CHANGE UP (ref 0.85–1.16)
MAGNESIUM SERPL-MCNC: 2.4 MG/DL — SIGNIFICANT CHANGE UP (ref 1.6–2.6)
MCHC RBC-ENTMCNC: 30.1 PG — SIGNIFICANT CHANGE UP (ref 27–34)
MCHC RBC-ENTMCNC: 32.2 G/DL — SIGNIFICANT CHANGE UP (ref 32–36)
MCV RBC AUTO: 93.6 FL — SIGNIFICANT CHANGE UP (ref 80–100)
NRBC BLD AUTO-RTO: 0 /100 WBCS — SIGNIFICANT CHANGE UP (ref 0–0)
PCO2 BLDA: 40 MMHG — SIGNIFICANT CHANGE UP (ref 32–46)
PCO2 BLDA: 40 MMHG — SIGNIFICANT CHANGE UP (ref 32–46)
PH BLDA: 7.43 — SIGNIFICANT CHANGE UP (ref 7.35–7.45)
PH BLDA: 7.43 — SIGNIFICANT CHANGE UP (ref 7.35–7.45)
PHOSPHATE SERPL-MCNC: 8.6 MG/DL — HIGH (ref 2.5–4.5)
PLATELET # BLD AUTO: 271 K/UL — SIGNIFICANT CHANGE UP (ref 150–400)
PO2 BLDA: 69 MMHG — LOW (ref 83–108)
PO2 BLDA: 85 MMHG — SIGNIFICANT CHANGE UP (ref 83–108)
POTASSIUM SERPL-MCNC: 5.4 MMOL/L — HIGH (ref 3.5–5.3)
POTASSIUM SERPL-SCNC: 5.4 MMOL/L — HIGH (ref 3.5–5.3)
PROT SERPL-MCNC: 7.7 GM/DL — SIGNIFICANT CHANGE UP (ref 6–8.3)
PROTHROM AB SERPL-ACNC: 13.2 SEC — SIGNIFICANT CHANGE UP (ref 9.9–13.4)
RBC # BLD: 3.92 M/UL — SIGNIFICANT CHANGE UP (ref 3.8–5.2)
RBC # FLD: 14.6 % — HIGH (ref 10.3–14.5)
SAO2 % BLDA: 94.8 % — SIGNIFICANT CHANGE UP (ref 94–98)
SAO2 % BLDA: 98.6 % — HIGH (ref 94–98)
SODIUM SERPL-SCNC: 133 MMOL/L — LOW (ref 135–145)
WBC # BLD: 14 K/UL — HIGH (ref 3.8–10.5)
WBC # FLD AUTO: 14 K/UL — HIGH (ref 3.8–10.5)

## 2025-08-24 PROCEDURE — 71045 X-RAY EXAM CHEST 1 VIEW: CPT | Mod: 26,77

## 2025-08-24 PROCEDURE — 99292 CRITICAL CARE ADDL 30 MIN: CPT | Mod: GC,25

## 2025-08-24 PROCEDURE — 71045 X-RAY EXAM CHEST 1 VIEW: CPT | Mod: 26

## 2025-08-24 PROCEDURE — 76604 US EXAM CHEST: CPT | Mod: 26

## 2025-08-24 PROCEDURE — 99291 CRITICAL CARE FIRST HOUR: CPT | Mod: 25

## 2025-08-24 PROCEDURE — 93308 TTE F-UP OR LMTD: CPT | Mod: 26

## 2025-08-24 PROCEDURE — 36600 WITHDRAWAL OF ARTERIAL BLOOD: CPT

## 2025-08-24 RX ORDER — REMDESIVIR 5 MG/ML
100 INJECTION INTRAVENOUS
Refills: 0 | Status: COMPLETED | OUTPATIENT
Start: 2025-08-25 | End: 2025-08-27

## 2025-08-24 RX ORDER — METOCLOPRAMIDE HCL 10 MG
10 TABLET ORAL ONCE
Refills: 0 | Status: COMPLETED | OUTPATIENT
Start: 2025-08-24 | End: 2025-08-24

## 2025-08-24 RX ORDER — ONDANSETRON HCL/PF 4 MG/2 ML
4 VIAL (ML) INJECTION ONCE
Refills: 0 | Status: COMPLETED | OUTPATIENT
Start: 2025-08-24 | End: 2025-08-24

## 2025-08-24 RX ORDER — REMDESIVIR 5 MG/ML
100 INJECTION INTRAVENOUS ONCE
Refills: 0 | Status: COMPLETED | OUTPATIENT
Start: 2025-08-24 | End: 2025-08-24

## 2025-08-24 RX ORDER — DEXMEDETOMIDINE HYDROCHLORIDE IN SODIUM CHLORIDE 4 UG/ML
0.2 INJECTION INTRAVENOUS
Qty: 200 | Refills: 0 | Status: DISCONTINUED | OUTPATIENT
Start: 2025-08-24 | End: 2025-08-24

## 2025-08-24 RX ORDER — INSULIN LISPRO 100 U/ML
INJECTION, SOLUTION INTRAVENOUS; SUBCUTANEOUS
Refills: 0 | Status: DISCONTINUED | OUTPATIENT
Start: 2025-08-24 | End: 2025-08-30

## 2025-08-24 RX ORDER — HEPARIN SODIUM 1000 [USP'U]/ML
5000 INJECTION INTRAVENOUS; SUBCUTANEOUS EVERY 8 HOURS
Refills: 0 | Status: DISCONTINUED | OUTPATIENT
Start: 2025-08-24 | End: 2025-08-30

## 2025-08-24 RX ORDER — INSULIN LISPRO 100 U/ML
INJECTION, SOLUTION INTRAVENOUS; SUBCUTANEOUS EVERY 6 HOURS
Refills: 0 | Status: DISCONTINUED | OUTPATIENT
Start: 2025-08-24 | End: 2025-08-24

## 2025-08-24 RX ORDER — HEPARIN SODIUM 1000 [USP'U]/ML
5000 INJECTION INTRAVENOUS; SUBCUTANEOUS EVERY 12 HOURS
Refills: 0 | Status: DISCONTINUED | OUTPATIENT
Start: 2025-08-24 | End: 2025-08-24

## 2025-08-24 RX ORDER — CEFTRIAXONE 500 MG/1
1000 INJECTION, POWDER, FOR SOLUTION INTRAMUSCULAR; INTRAVENOUS EVERY 24 HOURS
Refills: 0 | Status: COMPLETED | OUTPATIENT
Start: 2025-08-24 | End: 2025-08-28

## 2025-08-24 RX ORDER — DEXTROSE 50 % IN WATER 50 %
50 SYRINGE (ML) INTRAVENOUS ONCE
Refills: 0 | Status: COMPLETED | OUTPATIENT
Start: 2025-08-24 | End: 2025-08-24

## 2025-08-24 RX ORDER — HEPARIN SODIUM 1000 [USP'U]/ML
5000 INJECTION INTRAVENOUS; SUBCUTANEOUS EVERY 8 HOURS
Refills: 0 | Status: DISCONTINUED | OUTPATIENT
Start: 2025-08-24 | End: 2025-08-24

## 2025-08-24 RX ADMIN — CALCITRIOL 0.25 MICROGRAM(S): 0.5 CAPSULE, GELATIN COATED ORAL at 10:42

## 2025-08-24 RX ADMIN — Medication 4 MILLIGRAM(S): at 01:29

## 2025-08-24 RX ADMIN — Medication 40 MILLIGRAM(S): at 08:28

## 2025-08-24 RX ADMIN — Medication 650 MILLIGRAM(S): at 13:26

## 2025-08-24 RX ADMIN — Medication 650 MILLIGRAM(S): at 05:48

## 2025-08-24 RX ADMIN — Medication 650 MILLIGRAM(S): at 23:29

## 2025-08-24 RX ADMIN — Medication 50 MILLILITER(S): at 17:19

## 2025-08-24 RX ADMIN — Medication 90 MILLIGRAM(S): at 05:22

## 2025-08-24 RX ADMIN — Medication 255 MILLIGRAM(S): at 02:29

## 2025-08-24 RX ADMIN — INSULIN LISPRO 1: 100 INJECTION, SOLUTION INTRAVENOUS; SUBCUTANEOUS at 21:22

## 2025-08-24 RX ADMIN — Medication 2 MILLIGRAM(S): at 01:41

## 2025-08-24 RX ADMIN — Medication 10 MILLIGRAM(S): at 02:36

## 2025-08-24 RX ADMIN — Medication 650 MILLIGRAM(S): at 14:12

## 2025-08-24 RX ADMIN — Medication 650 MILLIGRAM(S): at 21:35

## 2025-08-24 RX ADMIN — INSULIN GLARGINE-YFGN 22 UNIT(S): 100 INJECTION, SOLUTION SUBCUTANEOUS at 21:21

## 2025-08-24 RX ADMIN — Medication 2 MILLIGRAM(S): at 02:46

## 2025-08-24 RX ADMIN — INSULIN LISPRO 3: 100 INJECTION, SOLUTION INTRAVENOUS; SUBCUTANEOUS at 06:08

## 2025-08-24 RX ADMIN — LOSARTAN POTASSIUM 25 MILLIGRAM(S): 100 TABLET, FILM COATED ORAL at 05:23

## 2025-08-24 RX ADMIN — Medication 40 MILLIGRAM(S): at 01:30

## 2025-08-24 RX ADMIN — CEFTRIAXONE 100 MILLIGRAM(S): 500 INJECTION, POWDER, FOR SOLUTION INTRAMUSCULAR; INTRAVENOUS at 02:29

## 2025-08-24 RX ADMIN — DEXAMETHASONE 6 MILLIGRAM(S): 0.5 TABLET ORAL at 05:22

## 2025-08-24 RX ADMIN — HEPARIN SODIUM 5000 UNIT(S): 1000 INJECTION INTRAVENOUS; SUBCUTANEOUS at 15:34

## 2025-08-24 RX ADMIN — Medication 650 MILLIGRAM(S): at 06:48

## 2025-08-24 RX ADMIN — HEPARIN SODIUM 5000 UNIT(S): 1000 INJECTION INTRAVENOUS; SUBCUTANEOUS at 21:21

## 2025-08-24 RX ADMIN — REMDESIVIR 200 MILLIGRAM(S): 5 INJECTION INTRAVENOUS at 03:12

## 2025-08-24 RX ADMIN — Medication 2 TABLET(S): at 21:21

## 2025-08-25 ENCOUNTER — RESULT REVIEW (OUTPATIENT)
Age: 25
End: 2025-08-25

## 2025-08-25 LAB
ALBUMIN SERPL ELPH-MCNC: 2.6 G/DL — LOW (ref 3.3–5)
ALBUMIN SERPL ELPH-MCNC: 2.6 G/DL — LOW (ref 3.3–5)
ALP SERPL-CCNC: 101 U/L — SIGNIFICANT CHANGE UP (ref 40–120)
ALP SERPL-CCNC: 102 U/L — SIGNIFICANT CHANGE UP (ref 40–120)
ALT FLD-CCNC: 17 U/L — SIGNIFICANT CHANGE UP (ref 12–78)
ALT FLD-CCNC: 19 U/L — SIGNIFICANT CHANGE UP (ref 12–78)
ANION GAP SERPL CALC-SCNC: 10 MMOL/L — SIGNIFICANT CHANGE UP (ref 5–17)
AST SERPL-CCNC: 29 U/L — SIGNIFICANT CHANGE UP (ref 15–37)
AST SERPL-CCNC: 30 U/L — SIGNIFICANT CHANGE UP (ref 15–37)
BILIRUB DIRECT SERPL-MCNC: 0.1 MG/DL — SIGNIFICANT CHANGE UP (ref 0–0.3)
BILIRUB INDIRECT FLD-MCNC: 0.6 MG/DL — SIGNIFICANT CHANGE UP (ref 0.2–1)
BILIRUB SERPL-MCNC: 0.5 MG/DL — SIGNIFICANT CHANGE UP (ref 0.2–1.2)
BILIRUB SERPL-MCNC: 0.7 MG/DL — SIGNIFICANT CHANGE UP (ref 0.2–1.2)
BUN SERPL-MCNC: 40 MG/DL — HIGH (ref 7–23)
CALCIUM SERPL-MCNC: 8.3 MG/DL — LOW (ref 8.5–10.1)
CHLORIDE SERPL-SCNC: 98 MMOL/L — SIGNIFICANT CHANGE UP (ref 96–108)
CO2 SERPL-SCNC: 28 MMOL/L — SIGNIFICANT CHANGE UP (ref 22–31)
CREAT SERPL-MCNC: 6.64 MG/DL — HIGH (ref 0.5–1.3)
EGFR: 8 ML/MIN/1.73M2 — LOW
EGFR: 8 ML/MIN/1.73M2 — LOW
GLUCOSE BLDC GLUCOMTR-MCNC: 119 MG/DL — HIGH (ref 70–99)
GLUCOSE BLDC GLUCOMTR-MCNC: 123 MG/DL — HIGH (ref 70–99)
GLUCOSE BLDC GLUCOMTR-MCNC: 214 MG/DL — HIGH (ref 70–99)
GLUCOSE BLDC GLUCOMTR-MCNC: 25 MG/DL — CRITICAL LOW (ref 70–99)
GLUCOSE BLDC GLUCOMTR-MCNC: 277 MG/DL — HIGH (ref 70–99)
GLUCOSE BLDC GLUCOMTR-MCNC: 312 MG/DL — HIGH (ref 70–99)
GLUCOSE BLDC GLUCOMTR-MCNC: 370 MG/DL — HIGH (ref 70–99)
GLUCOSE BLDC GLUCOMTR-MCNC: 94 MG/DL — SIGNIFICANT CHANGE UP (ref 70–99)
GLUCOSE BLDC GLUCOMTR-MCNC: 96 MG/DL — SIGNIFICANT CHANGE UP (ref 70–99)
GLUCOSE SERPL-MCNC: 25 MG/DL — CRITICAL LOW (ref 70–99)
GRAM STN FLD: ABNORMAL
HCT VFR BLD CALC: 36 % — SIGNIFICANT CHANGE UP (ref 34.5–45)
HGB BLD-MCNC: 11.5 G/DL — SIGNIFICANT CHANGE UP (ref 11.5–15.5)
INR BLD: 1.26 RATIO — HIGH (ref 0.85–1.16)
MAGNESIUM SERPL-MCNC: 2.3 MG/DL — SIGNIFICANT CHANGE UP (ref 1.6–2.6)
MCHC RBC-ENTMCNC: 29.6 PG — SIGNIFICANT CHANGE UP (ref 27–34)
MCHC RBC-ENTMCNC: 31.9 G/DL — LOW (ref 32–36)
MCV RBC AUTO: 92.8 FL — SIGNIFICANT CHANGE UP (ref 80–100)
NRBC BLD AUTO-RTO: 0 /100 WBCS — SIGNIFICANT CHANGE UP (ref 0–0)
PHOSPHATE SERPL-MCNC: 6.4 MG/DL — HIGH (ref 2.5–4.5)
PLATELET # BLD AUTO: 261 K/UL — SIGNIFICANT CHANGE UP (ref 150–400)
POTASSIUM SERPL-MCNC: 4.4 MMOL/L — SIGNIFICANT CHANGE UP (ref 3.5–5.3)
POTASSIUM SERPL-SCNC: 4.4 MMOL/L — SIGNIFICANT CHANGE UP (ref 3.5–5.3)
PROT SERPL-MCNC: 7.2 GM/DL — SIGNIFICANT CHANGE UP (ref 6–8.3)
PROT SERPL-MCNC: 7.4 GM/DL — SIGNIFICANT CHANGE UP (ref 6–8.3)
PROTHROM AB SERPL-ACNC: 14.6 SEC — HIGH (ref 9.9–13.4)
RBC # BLD: 3.88 M/UL — SIGNIFICANT CHANGE UP (ref 3.8–5.2)
RBC # FLD: 14.4 % — SIGNIFICANT CHANGE UP (ref 10.3–14.5)
SODIUM SERPL-SCNC: 136 MMOL/L — SIGNIFICANT CHANGE UP (ref 135–145)
SPECIMEN SOURCE: SIGNIFICANT CHANGE UP
WBC # BLD: 9.23 K/UL — SIGNIFICANT CHANGE UP (ref 3.8–10.5)
WBC # FLD AUTO: 9.23 K/UL — SIGNIFICANT CHANGE UP (ref 3.8–10.5)

## 2025-08-25 PROCEDURE — 93356 MYOCRD STRAIN IMG SPCKL TRCK: CPT

## 2025-08-25 PROCEDURE — 99232 SBSQ HOSP IP/OBS MODERATE 35: CPT

## 2025-08-25 PROCEDURE — 93306 TTE W/DOPPLER COMPLETE: CPT | Mod: 26

## 2025-08-25 PROCEDURE — 99233 SBSQ HOSP IP/OBS HIGH 50: CPT | Mod: GC

## 2025-08-25 PROCEDURE — G0545: CPT

## 2025-08-25 RX ORDER — DEXTROSE 50 % IN WATER 50 %
15 SYRINGE (ML) INTRAVENOUS ONCE
Refills: 0 | Status: DISCONTINUED | OUTPATIENT
Start: 2025-08-25 | End: 2025-08-25

## 2025-08-25 RX ORDER — DEXTROSE 50 % IN WATER 50 %
25 SYRINGE (ML) INTRAVENOUS ONCE
Refills: 0 | Status: DISCONTINUED | OUTPATIENT
Start: 2025-08-25 | End: 2025-08-25

## 2025-08-25 RX ORDER — HEPARIN SODIUM 1000 [USP'U]/ML
1000 INJECTION INTRAVENOUS; SUBCUTANEOUS
Refills: 0 | Status: DISCONTINUED | OUTPATIENT
Start: 2025-08-25 | End: 2025-08-30

## 2025-08-25 RX ORDER — GLUCAGON 3 MG/1
1 POWDER NASAL ONCE
Refills: 0 | Status: DISCONTINUED | OUTPATIENT
Start: 2025-08-25 | End: 2025-08-25

## 2025-08-25 RX ORDER — DEXTROSE 50 % IN WATER 50 %
25 SYRINGE (ML) INTRAVENOUS ONCE
Refills: 0 | Status: COMPLETED | OUTPATIENT
Start: 2025-08-25 | End: 2025-08-25

## 2025-08-25 RX ORDER — DEXTROSE 50 % IN WATER 50 %
12.5 SYRINGE (ML) INTRAVENOUS ONCE
Refills: 0 | Status: DISCONTINUED | OUTPATIENT
Start: 2025-08-25 | End: 2025-08-25

## 2025-08-25 RX ORDER — SODIUM CHLORIDE 9 G/1000ML
1000 INJECTION, SOLUTION INTRAVENOUS
Refills: 0 | Status: DISCONTINUED | OUTPATIENT
Start: 2025-08-25 | End: 2025-08-25

## 2025-08-25 RX ORDER — DEXTROSE MONOHYDRATE 100 G/1000ML
1000 INJECTION, SOLUTION INTRAVENOUS
Refills: 0 | Status: DISCONTINUED | OUTPATIENT
Start: 2025-08-25 | End: 2025-08-25

## 2025-08-25 RX ORDER — INSULIN LISPRO 100 U/ML
3 INJECTION, SOLUTION INTRAVENOUS; SUBCUTANEOUS
Refills: 0 | Status: DISCONTINUED | OUTPATIENT
Start: 2025-08-25 | End: 2025-08-30

## 2025-08-25 RX ORDER — INSULIN GLARGINE-YFGN 100 [IU]/ML
22 INJECTION, SOLUTION SUBCUTANEOUS AT BEDTIME
Refills: 0 | Status: DISCONTINUED | OUTPATIENT
Start: 2025-08-25 | End: 2025-08-30

## 2025-08-25 RX ADMIN — Medication 25 GRAM(S): at 06:32

## 2025-08-25 RX ADMIN — REMDESIVIR 200 MILLIGRAM(S): 5 INJECTION INTRAVENOUS at 01:46

## 2025-08-25 RX ADMIN — INSULIN GLARGINE-YFGN 22 UNIT(S): 100 INJECTION, SOLUTION SUBCUTANEOUS at 22:08

## 2025-08-25 RX ADMIN — CEFTRIAXONE 100 MILLIGRAM(S): 500 INJECTION, POWDER, FOR SOLUTION INTRAMUSCULAR; INTRAVENOUS at 01:03

## 2025-08-25 RX ADMIN — INSULIN LISPRO 5: 100 INJECTION, SOLUTION INTRAVENOUS; SUBCUTANEOUS at 22:08

## 2025-08-25 RX ADMIN — Medication 255 MILLIGRAM(S): at 01:03

## 2025-08-25 RX ADMIN — HEPARIN SODIUM 5000 UNIT(S): 1000 INJECTION INTRAVENOUS; SUBCUTANEOUS at 22:09

## 2025-08-25 RX ADMIN — LOSARTAN POTASSIUM 25 MILLIGRAM(S): 100 TABLET, FILM COATED ORAL at 05:59

## 2025-08-25 RX ADMIN — HEPARIN SODIUM 5000 UNIT(S): 1000 INJECTION INTRAVENOUS; SUBCUTANEOUS at 06:00

## 2025-08-25 RX ADMIN — DEXAMETHASONE 6 MILLIGRAM(S): 0.5 TABLET ORAL at 06:00

## 2025-08-25 RX ADMIN — HEPARIN SODIUM 1000 UNIT(S): 1000 INJECTION INTRAVENOUS; SUBCUTANEOUS at 14:33

## 2025-08-25 RX ADMIN — Medication 90 MILLIGRAM(S): at 05:59

## 2025-08-25 RX ADMIN — Medication 40 MILLIGRAM(S): at 11:32

## 2025-08-25 RX ADMIN — CALCITRIOL 0.25 MICROGRAM(S): 0.5 CAPSULE, GELATIN COATED ORAL at 11:32

## 2025-08-25 RX ADMIN — HEPARIN SODIUM 5000 UNIT(S): 1000 INJECTION INTRAVENOUS; SUBCUTANEOUS at 14:41

## 2025-08-25 RX ADMIN — INSULIN LISPRO 3: 100 INJECTION, SOLUTION INTRAVENOUS; SUBCUTANEOUS at 11:35

## 2025-08-25 RX ADMIN — DEXTROSE MONOHYDRATE 25 MILLILITER(S): 100 INJECTION, SOLUTION INTRAVENOUS at 06:22

## 2025-08-25 RX ADMIN — INSULIN LISPRO 2: 100 INJECTION, SOLUTION INTRAVENOUS; SUBCUTANEOUS at 16:26

## 2025-08-25 RX ADMIN — INSULIN LISPRO 3 UNIT(S): 100 INJECTION, SOLUTION INTRAVENOUS; SUBCUTANEOUS at 16:27

## 2025-08-25 RX ADMIN — INSULIN LISPRO 3 UNIT(S): 100 INJECTION, SOLUTION INTRAVENOUS; SUBCUTANEOUS at 11:35

## 2025-08-26 LAB
ALBUMIN SERPL ELPH-MCNC: 2.3 G/DL — LOW (ref 3.3–5)
ALBUMIN SERPL ELPH-MCNC: 2.3 G/DL — LOW (ref 3.3–5)
ALP SERPL-CCNC: 102 U/L — SIGNIFICANT CHANGE UP (ref 40–120)
ALP SERPL-CCNC: 102 U/L — SIGNIFICANT CHANGE UP (ref 40–120)
ALT FLD-CCNC: 14 U/L — SIGNIFICANT CHANGE UP (ref 12–78)
ALT FLD-CCNC: 14 U/L — SIGNIFICANT CHANGE UP (ref 12–78)
ANION GAP SERPL CALC-SCNC: 10 MMOL/L — SIGNIFICANT CHANGE UP (ref 5–17)
AST SERPL-CCNC: 19 U/L — SIGNIFICANT CHANGE UP (ref 15–37)
AST SERPL-CCNC: 19 U/L — SIGNIFICANT CHANGE UP (ref 15–37)
BASOPHILS # BLD AUTO: 0.03 K/UL — SIGNIFICANT CHANGE UP (ref 0–0.2)
BASOPHILS NFR BLD AUTO: 0.4 % — SIGNIFICANT CHANGE UP (ref 0–2)
BILIRUB DIRECT SERPL-MCNC: 0.1 MG/DL — SIGNIFICANT CHANGE UP (ref 0–0.3)
BILIRUB INDIRECT FLD-MCNC: 0.4 MG/DL — SIGNIFICANT CHANGE UP (ref 0.2–1)
BILIRUB SERPL-MCNC: 0.5 MG/DL — SIGNIFICANT CHANGE UP (ref 0.2–1.2)
BILIRUB SERPL-MCNC: 0.5 MG/DL — SIGNIFICANT CHANGE UP (ref 0.2–1.2)
BUN SERPL-MCNC: 41 MG/DL — HIGH (ref 7–23)
CALCIUM SERPL-MCNC: 7.7 MG/DL — LOW (ref 8.5–10.1)
CHLORIDE SERPL-SCNC: 100 MMOL/L — SIGNIFICANT CHANGE UP (ref 96–108)
CO2 SERPL-SCNC: 26 MMOL/L — SIGNIFICANT CHANGE UP (ref 22–31)
CREAT SERPL-MCNC: 4.44 MG/DL — HIGH (ref 0.5–1.3)
CULTURE RESULTS: ABNORMAL
EGFR: 13 ML/MIN/1.73M2 — LOW
EGFR: 13 ML/MIN/1.73M2 — LOW
EOSINOPHIL # BLD AUTO: 0.09 K/UL — SIGNIFICANT CHANGE UP (ref 0–0.5)
EOSINOPHIL NFR BLD AUTO: 1.3 % — SIGNIFICANT CHANGE UP (ref 0–6)
GLUCOSE BLDC GLUCOMTR-MCNC: 127 MG/DL — HIGH (ref 70–99)
GLUCOSE BLDC GLUCOMTR-MCNC: 150 MG/DL — HIGH (ref 70–99)
GLUCOSE BLDC GLUCOMTR-MCNC: 219 MG/DL — HIGH (ref 70–99)
GLUCOSE BLDC GLUCOMTR-MCNC: 407 MG/DL — HIGH (ref 70–99)
GLUCOSE BLDC GLUCOMTR-MCNC: 412 MG/DL — HIGH (ref 70–99)
GLUCOSE BLDC GLUCOMTR-MCNC: 412 MG/DL — HIGH (ref 70–99)
GLUCOSE BLDC GLUCOMTR-MCNC: 421 MG/DL — HIGH (ref 70–99)
GLUCOSE BLDC GLUCOMTR-MCNC: 423 MG/DL — HIGH (ref 70–99)
GLUCOSE BLDC GLUCOMTR-MCNC: 439 MG/DL — HIGH (ref 70–99)
GLUCOSE SERPL-MCNC: 207 MG/DL — HIGH (ref 70–99)
GRAM STN FLD: ABNORMAL
HCT VFR BLD CALC: 33.6 % — LOW (ref 34.5–45)
HGB BLD-MCNC: 10.9 G/DL — LOW (ref 11.5–15.5)
IMM GRANULOCYTES NFR BLD AUTO: 0.3 % — SIGNIFICANT CHANGE UP (ref 0–0.9)
INR BLD: 1.02 RATIO — SIGNIFICANT CHANGE UP (ref 0.85–1.16)
LYMPHOCYTES # BLD AUTO: 1.24 K/UL — SIGNIFICANT CHANGE UP (ref 1–3.3)
LYMPHOCYTES # BLD AUTO: 18 % — SIGNIFICANT CHANGE UP (ref 13–44)
M PNEUMO IGM SER-ACNC: 1.29 INDEX — HIGH (ref 0–0.9)
MAGNESIUM SERPL-MCNC: 2 MG/DL — SIGNIFICANT CHANGE UP (ref 1.6–2.6)
MCHC RBC-ENTMCNC: 29.7 PG — SIGNIFICANT CHANGE UP (ref 27–34)
MCHC RBC-ENTMCNC: 32.4 G/DL — SIGNIFICANT CHANGE UP (ref 32–36)
MCV RBC AUTO: 91.6 FL — SIGNIFICANT CHANGE UP (ref 80–100)
MONOCYTES # BLD AUTO: 0.2 K/UL — SIGNIFICANT CHANGE UP (ref 0–0.9)
MONOCYTES NFR BLD AUTO: 2.9 % — SIGNIFICANT CHANGE UP (ref 2–14)
MYCOPLASMA AG SPEC QL: POSITIVE
NEUTROPHILS # BLD AUTO: 5.32 K/UL — SIGNIFICANT CHANGE UP (ref 1.8–7.4)
NEUTROPHILS NFR BLD AUTO: 77.1 % — HIGH (ref 43–77)
NRBC BLD AUTO-RTO: 0 /100 WBCS — SIGNIFICANT CHANGE UP (ref 0–0)
PHOSPHATE SERPL-MCNC: 4.1 MG/DL — SIGNIFICANT CHANGE UP (ref 2.5–4.5)
PLATELET # BLD AUTO: 272 K/UL — SIGNIFICANT CHANGE UP (ref 150–400)
POTASSIUM SERPL-MCNC: 3.6 MMOL/L — SIGNIFICANT CHANGE UP (ref 3.5–5.3)
POTASSIUM SERPL-SCNC: 3.6 MMOL/L — SIGNIFICANT CHANGE UP (ref 3.5–5.3)
PROT SERPL-MCNC: 6.7 GM/DL — SIGNIFICANT CHANGE UP (ref 6–8.3)
PROT SERPL-MCNC: 6.7 GM/DL — SIGNIFICANT CHANGE UP (ref 6–8.3)
PROTHROM AB SERPL-ACNC: 11.8 SEC — SIGNIFICANT CHANGE UP (ref 9.9–13.4)
RBC # BLD: 3.67 M/UL — LOW (ref 3.8–5.2)
RBC # FLD: 13.9 % — SIGNIFICANT CHANGE UP (ref 10.3–14.5)
SODIUM SERPL-SCNC: 136 MMOL/L — SIGNIFICANT CHANGE UP (ref 135–145)
SPECIMEN SOURCE: SIGNIFICANT CHANGE UP
WBC # BLD: 6.9 K/UL — SIGNIFICANT CHANGE UP (ref 3.8–10.5)
WBC # FLD AUTO: 6.9 K/UL — SIGNIFICANT CHANGE UP (ref 3.8–10.5)

## 2025-08-26 PROCEDURE — 99232 SBSQ HOSP IP/OBS MODERATE 35: CPT

## 2025-08-26 PROCEDURE — G0545: CPT

## 2025-08-26 RX ADMIN — CALCITRIOL 0.25 MICROGRAM(S): 0.5 CAPSULE, GELATIN COATED ORAL at 11:18

## 2025-08-26 RX ADMIN — LOSARTAN POTASSIUM 25 MILLIGRAM(S): 100 TABLET, FILM COATED ORAL at 06:25

## 2025-08-26 RX ADMIN — INSULIN LISPRO 3 UNIT(S): 100 INJECTION, SOLUTION INTRAVENOUS; SUBCUTANEOUS at 07:33

## 2025-08-26 RX ADMIN — INSULIN LISPRO 3 UNIT(S): 100 INJECTION, SOLUTION INTRAVENOUS; SUBCUTANEOUS at 11:18

## 2025-08-26 RX ADMIN — HEPARIN SODIUM 5000 UNIT(S): 1000 INJECTION INTRAVENOUS; SUBCUTANEOUS at 06:25

## 2025-08-26 RX ADMIN — Medication 90 MILLIGRAM(S): at 06:25

## 2025-08-26 RX ADMIN — Medication 255 MILLIGRAM(S): at 01:20

## 2025-08-26 RX ADMIN — INSULIN LISPRO 6: 100 INJECTION, SOLUTION INTRAVENOUS; SUBCUTANEOUS at 16:52

## 2025-08-26 RX ADMIN — DEXAMETHASONE 6 MILLIGRAM(S): 0.5 TABLET ORAL at 06:25

## 2025-08-26 RX ADMIN — INSULIN LISPRO 6: 100 INJECTION, SOLUTION INTRAVENOUS; SUBCUTANEOUS at 21:59

## 2025-08-26 RX ADMIN — Medication 40 MILLIGRAM(S): at 11:24

## 2025-08-26 RX ADMIN — INSULIN LISPRO 3 UNIT(S): 100 INJECTION, SOLUTION INTRAVENOUS; SUBCUTANEOUS at 17:15

## 2025-08-26 RX ADMIN — HEPARIN SODIUM 5000 UNIT(S): 1000 INJECTION INTRAVENOUS; SUBCUTANEOUS at 21:58

## 2025-08-26 RX ADMIN — INSULIN GLARGINE-YFGN 22 UNIT(S): 100 INJECTION, SOLUTION SUBCUTANEOUS at 22:00

## 2025-08-26 RX ADMIN — CEFTRIAXONE 100 MILLIGRAM(S): 500 INJECTION, POWDER, FOR SOLUTION INTRAMUSCULAR; INTRAVENOUS at 01:20

## 2025-08-26 RX ADMIN — REMDESIVIR 200 MILLIGRAM(S): 5 INJECTION INTRAVENOUS at 03:57

## 2025-08-27 LAB
ALBUMIN SERPL ELPH-MCNC: 2.2 G/DL — LOW (ref 3.3–5)
ALBUMIN SERPL ELPH-MCNC: 2.3 G/DL — LOW (ref 3.3–5)
ALP SERPL-CCNC: 103 U/L — SIGNIFICANT CHANGE UP (ref 40–120)
ALP SERPL-CCNC: 103 U/L — SIGNIFICANT CHANGE UP (ref 40–120)
ALT FLD-CCNC: 14 U/L — SIGNIFICANT CHANGE UP (ref 12–78)
ALT FLD-CCNC: 16 U/L — SIGNIFICANT CHANGE UP (ref 12–78)
ANION GAP SERPL CALC-SCNC: 15 MMOL/L — SIGNIFICANT CHANGE UP (ref 5–17)
AST SERPL-CCNC: 16 U/L — SIGNIFICANT CHANGE UP (ref 15–37)
AST SERPL-CCNC: 17 U/L — SIGNIFICANT CHANGE UP (ref 15–37)
BASOPHILS # BLD AUTO: 0.02 K/UL — SIGNIFICANT CHANGE UP (ref 0–0.2)
BASOPHILS NFR BLD AUTO: 0.3 % — SIGNIFICANT CHANGE UP (ref 0–2)
BILIRUB DIRECT SERPL-MCNC: 0.1 MG/DL — SIGNIFICANT CHANGE UP (ref 0–0.3)
BILIRUB INDIRECT FLD-MCNC: 0.2 MG/DL — SIGNIFICANT CHANGE UP (ref 0.2–1)
BILIRUB SERPL-MCNC: 0.3 MG/DL — SIGNIFICANT CHANGE UP (ref 0.2–1.2)
BILIRUB SERPL-MCNC: 0.3 MG/DL — SIGNIFICANT CHANGE UP (ref 0.2–1.2)
BUN SERPL-MCNC: 95 MG/DL — HIGH (ref 7–23)
CALCIUM SERPL-MCNC: 8.4 MG/DL — LOW (ref 8.5–10.1)
CHLORIDE SERPL-SCNC: 90 MMOL/L — LOW (ref 96–108)
CO2 SERPL-SCNC: 21 MMOL/L — LOW (ref 22–31)
CREAT SERPL-MCNC: 7.52 MG/DL — HIGH (ref 0.5–1.3)
EGFR: 7 ML/MIN/1.73M2 — LOW
EGFR: 7 ML/MIN/1.73M2 — LOW
EOSINOPHIL # BLD AUTO: 0.03 K/UL — SIGNIFICANT CHANGE UP (ref 0–0.5)
EOSINOPHIL NFR BLD AUTO: 0.5 % — SIGNIFICANT CHANGE UP (ref 0–6)
GLUCOSE BLDC GLUCOMTR-MCNC: 192 MG/DL — HIGH (ref 70–99)
GLUCOSE BLDC GLUCOMTR-MCNC: 237 MG/DL — HIGH (ref 70–99)
GLUCOSE BLDC GLUCOMTR-MCNC: 255 MG/DL — HIGH (ref 70–99)
GLUCOSE BLDC GLUCOMTR-MCNC: 342 MG/DL — HIGH (ref 70–99)
GLUCOSE BLDC GLUCOMTR-MCNC: 406 MG/DL — HIGH (ref 70–99)
GLUCOSE BLDC GLUCOMTR-MCNC: 81 MG/DL — SIGNIFICANT CHANGE UP (ref 70–99)
GLUCOSE SERPL-MCNC: 354 MG/DL — HIGH (ref 70–99)
HCT VFR BLD CALC: 30.1 % — LOW (ref 34.5–45)
HGB BLD-MCNC: 10.1 G/DL — LOW (ref 11.5–15.5)
IMM GRANULOCYTES NFR BLD AUTO: 0.7 % — SIGNIFICANT CHANGE UP (ref 0–0.9)
INR BLD: 0.86 RATIO — SIGNIFICANT CHANGE UP (ref 0.85–1.16)
LYMPHOCYTES # BLD AUTO: 1.23 K/UL — SIGNIFICANT CHANGE UP (ref 1–3.3)
LYMPHOCYTES # BLD AUTO: 21.5 % — SIGNIFICANT CHANGE UP (ref 13–44)
MAGNESIUM SERPL-MCNC: 2.3 MG/DL — SIGNIFICANT CHANGE UP (ref 1.6–2.6)
MCHC RBC-ENTMCNC: 29.4 PG — SIGNIFICANT CHANGE UP (ref 27–34)
MCHC RBC-ENTMCNC: 33.6 G/DL — SIGNIFICANT CHANGE UP (ref 32–36)
MCV RBC AUTO: 87.8 FL — SIGNIFICANT CHANGE UP (ref 80–100)
MONOCYTES # BLD AUTO: 0.3 K/UL — SIGNIFICANT CHANGE UP (ref 0–0.9)
MONOCYTES NFR BLD AUTO: 5.2 % — SIGNIFICANT CHANGE UP (ref 2–14)
NEUTROPHILS # BLD AUTO: 4.1 K/UL — SIGNIFICANT CHANGE UP (ref 1.8–7.4)
NEUTROPHILS NFR BLD AUTO: 71.8 % — SIGNIFICANT CHANGE UP (ref 43–77)
NRBC BLD AUTO-RTO: 0 /100 WBCS — SIGNIFICANT CHANGE UP (ref 0–0)
PHOSPHATE SERPL-MCNC: 6.4 MG/DL — HIGH (ref 2.5–4.5)
PLATELET # BLD AUTO: 342 K/UL — SIGNIFICANT CHANGE UP (ref 150–400)
POTASSIUM SERPL-MCNC: 4.2 MMOL/L — SIGNIFICANT CHANGE UP (ref 3.5–5.3)
POTASSIUM SERPL-SCNC: 4.2 MMOL/L — SIGNIFICANT CHANGE UP (ref 3.5–5.3)
PROT SERPL-MCNC: 6.5 GM/DL — SIGNIFICANT CHANGE UP (ref 6–8.3)
PROT SERPL-MCNC: 6.7 GM/DL — SIGNIFICANT CHANGE UP (ref 6–8.3)
PROTHROM AB SERPL-ACNC: 10 SEC — SIGNIFICANT CHANGE UP (ref 9.9–13.4)
RBC # BLD: 3.43 M/UL — LOW (ref 3.8–5.2)
RBC # FLD: 13.7 % — SIGNIFICANT CHANGE UP (ref 10.3–14.5)
SODIUM SERPL-SCNC: 126 MMOL/L — LOW (ref 135–145)
WBC # BLD: 5.72 K/UL — SIGNIFICANT CHANGE UP (ref 3.8–10.5)
WBC # FLD AUTO: 5.72 K/UL — SIGNIFICANT CHANGE UP (ref 3.8–10.5)

## 2025-08-27 PROCEDURE — 99232 SBSQ HOSP IP/OBS MODERATE 35: CPT

## 2025-08-27 RX ADMIN — INSULIN LISPRO 3 UNIT(S): 100 INJECTION, SOLUTION INTRAVENOUS; SUBCUTANEOUS at 17:24

## 2025-08-27 RX ADMIN — LOSARTAN POTASSIUM 25 MILLIGRAM(S): 100 TABLET, FILM COATED ORAL at 06:15

## 2025-08-27 RX ADMIN — INSULIN LISPRO 3 UNIT(S): 100 INJECTION, SOLUTION INTRAVENOUS; SUBCUTANEOUS at 08:35

## 2025-08-27 RX ADMIN — Medication 90 MILLIGRAM(S): at 06:15

## 2025-08-27 RX ADMIN — HEPARIN SODIUM 5000 UNIT(S): 1000 INJECTION INTRAVENOUS; SUBCUTANEOUS at 21:55

## 2025-08-27 RX ADMIN — INSULIN LISPRO 2: 100 INJECTION, SOLUTION INTRAVENOUS; SUBCUTANEOUS at 17:25

## 2025-08-27 RX ADMIN — DEXAMETHASONE 6 MILLIGRAM(S): 0.5 TABLET ORAL at 06:15

## 2025-08-27 RX ADMIN — Medication 1 APPLICATION(S): at 15:00

## 2025-08-27 RX ADMIN — HEPARIN SODIUM 1000 UNIT(S): 1000 INJECTION INTRAVENOUS; SUBCUTANEOUS at 17:40

## 2025-08-27 RX ADMIN — Medication 2 TABLET(S): at 21:55

## 2025-08-27 RX ADMIN — Medication 255 MILLIGRAM(S): at 01:37

## 2025-08-27 RX ADMIN — INSULIN GLARGINE-YFGN 22 UNIT(S): 100 INJECTION, SOLUTION SUBCUTANEOUS at 21:54

## 2025-08-27 RX ADMIN — CEFTRIAXONE 100 MILLIGRAM(S): 500 INJECTION, POWDER, FOR SOLUTION INTRAMUSCULAR; INTRAVENOUS at 01:37

## 2025-08-27 RX ADMIN — INSULIN LISPRO 3: 100 INJECTION, SOLUTION INTRAVENOUS; SUBCUTANEOUS at 08:35

## 2025-08-27 RX ADMIN — HEPARIN SODIUM 5000 UNIT(S): 1000 INJECTION INTRAVENOUS; SUBCUTANEOUS at 15:00

## 2025-08-27 RX ADMIN — CALCITRIOL 0.25 MICROGRAM(S): 0.5 CAPSULE, GELATIN COATED ORAL at 15:00

## 2025-08-27 RX ADMIN — REMDESIVIR 200 MILLIGRAM(S): 5 INJECTION INTRAVENOUS at 04:22

## 2025-08-27 RX ADMIN — Medication 40 MILLIGRAM(S): at 12:31

## 2025-08-27 RX ADMIN — INSULIN LISPRO 1: 100 INJECTION, SOLUTION INTRAVENOUS; SUBCUTANEOUS at 21:54

## 2025-08-27 RX ADMIN — HEPARIN SODIUM 5000 UNIT(S): 1000 INJECTION INTRAVENOUS; SUBCUTANEOUS at 06:15

## 2025-08-27 RX ADMIN — INSULIN LISPRO 3 UNIT(S): 100 INJECTION, SOLUTION INTRAVENOUS; SUBCUTANEOUS at 12:30

## 2025-08-28 ENCOUNTER — TRANSCRIPTION ENCOUNTER (OUTPATIENT)
Age: 25
End: 2025-08-28

## 2025-08-28 LAB
ALBUMIN SERPL ELPH-MCNC: 2.4 G/DL — LOW (ref 3.3–5)
ALP SERPL-CCNC: 104 U/L — SIGNIFICANT CHANGE UP (ref 40–120)
ALT FLD-CCNC: 21 U/L — SIGNIFICANT CHANGE UP (ref 12–78)
ANION GAP SERPL CALC-SCNC: 13 MMOL/L — SIGNIFICANT CHANGE UP (ref 5–17)
AST SERPL-CCNC: 27 U/L — SIGNIFICANT CHANGE UP (ref 15–37)
BILIRUB DIRECT SERPL-MCNC: 0.1 MG/DL — SIGNIFICANT CHANGE UP (ref 0–0.3)
BILIRUB SERPL-MCNC: 0.3 MG/DL — SIGNIFICANT CHANGE UP (ref 0.2–1.2)
BUN SERPL-MCNC: 69 MG/DL — HIGH (ref 7–23)
CALCIUM SERPL-MCNC: 8.3 MG/DL — LOW (ref 8.5–10.1)
CHLORIDE SERPL-SCNC: 98 MMOL/L — SIGNIFICANT CHANGE UP (ref 96–108)
CO2 SERPL-SCNC: 24 MMOL/L — SIGNIFICANT CHANGE UP (ref 22–31)
CREAT SERPL-MCNC: 5.75 MG/DL — HIGH (ref 0.5–1.3)
CULTURE RESULTS: SIGNIFICANT CHANGE UP
CULTURE RESULTS: SIGNIFICANT CHANGE UP
EGFR: 10 ML/MIN/1.73M2 — LOW
EGFR: 10 ML/MIN/1.73M2 — LOW
GLUCOSE BLDC GLUCOMTR-MCNC: 130 MG/DL — HIGH (ref 70–99)
GLUCOSE BLDC GLUCOMTR-MCNC: 132 MG/DL — HIGH (ref 70–99)
GLUCOSE BLDC GLUCOMTR-MCNC: 212 MG/DL — HIGH (ref 70–99)
GLUCOSE BLDC GLUCOMTR-MCNC: 43 MG/DL — CRITICAL LOW (ref 70–99)
GLUCOSE BLDC GLUCOMTR-MCNC: 45 MG/DL — CRITICAL LOW (ref 70–99)
GLUCOSE BLDC GLUCOMTR-MCNC: 78 MG/DL — SIGNIFICANT CHANGE UP (ref 70–99)
GLUCOSE SERPL-MCNC: 139 MG/DL — HIGH (ref 70–99)
INR BLD: 0.9 RATIO — SIGNIFICANT CHANGE UP (ref 0.85–1.16)
POTASSIUM SERPL-MCNC: 3.5 MMOL/L — SIGNIFICANT CHANGE UP (ref 3.5–5.3)
POTASSIUM SERPL-SCNC: 3.5 MMOL/L — SIGNIFICANT CHANGE UP (ref 3.5–5.3)
PROT SERPL-MCNC: 6.9 GM/DL — SIGNIFICANT CHANGE UP (ref 6–8.3)
PROTHROM AB SERPL-ACNC: 10.4 SEC — SIGNIFICANT CHANGE UP (ref 9.9–13.4)
SODIUM SERPL-SCNC: 135 MMOL/L — SIGNIFICANT CHANGE UP (ref 135–145)
SPECIMEN SOURCE: SIGNIFICANT CHANGE UP
SPECIMEN SOURCE: SIGNIFICANT CHANGE UP

## 2025-08-28 PROCEDURE — G0545: CPT

## 2025-08-28 PROCEDURE — 99233 SBSQ HOSP IP/OBS HIGH 50: CPT

## 2025-08-28 PROCEDURE — 99232 SBSQ HOSP IP/OBS MODERATE 35: CPT

## 2025-08-28 RX ORDER — DEXTROSE 50 % IN WATER 50 %
25 SYRINGE (ML) INTRAVENOUS ONCE
Refills: 0 | Status: DISCONTINUED | OUTPATIENT
Start: 2025-08-28 | End: 2025-08-30

## 2025-08-28 RX ORDER — SIMETHICONE 80 MG
80 TABLET,CHEWABLE ORAL ONCE
Refills: 0 | Status: COMPLETED | OUTPATIENT
Start: 2025-08-28 | End: 2025-08-28

## 2025-08-28 RX ORDER — METOCLOPRAMIDE HCL 10 MG
10 TABLET ORAL ONCE
Refills: 0 | Status: COMPLETED | OUTPATIENT
Start: 2025-08-28 | End: 2025-08-28

## 2025-08-28 RX ORDER — ACETAMINOPHEN 500 MG/5ML
1000 LIQUID (ML) ORAL ONCE
Refills: 0 | Status: COMPLETED | OUTPATIENT
Start: 2025-08-28 | End: 2025-08-28

## 2025-08-28 RX ORDER — ONDANSETRON HCL/PF 4 MG/2 ML
4 VIAL (ML) INJECTION EVERY 6 HOURS
Refills: 0 | Status: DISCONTINUED | OUTPATIENT
Start: 2025-08-28 | End: 2025-08-30

## 2025-08-28 RX ORDER — DEXTROSE 50 % IN WATER 50 %
25 SYRINGE (ML) INTRAVENOUS ONCE
Refills: 0 | Status: COMPLETED | OUTPATIENT
Start: 2025-08-28 | End: 2025-08-28

## 2025-08-28 RX ORDER — DEXTROSE 50 % IN WATER 50 %
12.5 SYRINGE (ML) INTRAVENOUS ONCE
Refills: 0 | Status: DISCONTINUED | OUTPATIENT
Start: 2025-08-28 | End: 2025-08-30

## 2025-08-28 RX ORDER — METOCLOPRAMIDE HCL 10 MG
5 TABLET ORAL ONCE
Refills: 0 | Status: COMPLETED | OUTPATIENT
Start: 2025-08-28 | End: 2025-08-28

## 2025-08-28 RX ORDER — DEXTROSE 50 % IN WATER 50 %
15 SYRINGE (ML) INTRAVENOUS ONCE
Refills: 0 | Status: DISCONTINUED | OUTPATIENT
Start: 2025-08-28 | End: 2025-08-30

## 2025-08-28 RX ADMIN — Medication 4 MILLIGRAM(S): at 15:15

## 2025-08-28 RX ADMIN — Medication 4 MILLIGRAM(S): at 08:15

## 2025-08-28 RX ADMIN — CEFTRIAXONE 100 MILLIGRAM(S): 500 INJECTION, POWDER, FOR SOLUTION INTRAMUSCULAR; INTRAVENOUS at 06:06

## 2025-08-28 RX ADMIN — Medication 650 MILLIGRAM(S): at 23:47

## 2025-08-28 RX ADMIN — Medication 4 MILLIGRAM(S): at 23:47

## 2025-08-28 RX ADMIN — Medication 1000 MILLIGRAM(S): at 19:09

## 2025-08-28 RX ADMIN — LOSARTAN POTASSIUM 25 MILLIGRAM(S): 100 TABLET, FILM COATED ORAL at 06:06

## 2025-08-28 RX ADMIN — Medication 40 MILLIGRAM(S): at 11:43

## 2025-08-28 RX ADMIN — Medication 2 MILLIGRAM(S): at 15:16

## 2025-08-28 RX ADMIN — Medication 5 MILLIGRAM(S): at 20:07

## 2025-08-28 RX ADMIN — Medication 1 APPLICATION(S): at 06:06

## 2025-08-28 RX ADMIN — Medication 30 MILLILITER(S): at 10:03

## 2025-08-28 RX ADMIN — HEPARIN SODIUM 5000 UNIT(S): 1000 INJECTION INTRAVENOUS; SUBCUTANEOUS at 06:06

## 2025-08-28 RX ADMIN — Medication 400 MILLIGRAM(S): at 18:02

## 2025-08-28 RX ADMIN — Medication 25 GRAM(S): at 22:45

## 2025-08-28 RX ADMIN — HEPARIN SODIUM 5000 UNIT(S): 1000 INJECTION INTRAVENOUS; SUBCUTANEOUS at 15:46

## 2025-08-28 RX ADMIN — Medication 10 MILLIGRAM(S): at 11:43

## 2025-08-28 RX ADMIN — INSULIN LISPRO 3 UNIT(S): 100 INJECTION, SOLUTION INTRAVENOUS; SUBCUTANEOUS at 09:13

## 2025-08-28 RX ADMIN — Medication 80 MILLIGRAM(S): at 17:13

## 2025-08-28 RX ADMIN — INSULIN LISPRO 3 UNIT(S): 100 INJECTION, SOLUTION INTRAVENOUS; SUBCUTANEOUS at 18:03

## 2025-08-28 RX ADMIN — CALCITRIOL 0.25 MICROGRAM(S): 0.5 CAPSULE, GELATIN COATED ORAL at 11:44

## 2025-08-28 RX ADMIN — Medication 90 MILLIGRAM(S): at 06:06

## 2025-08-28 RX ADMIN — HEPARIN SODIUM 5000 UNIT(S): 1000 INJECTION INTRAVENOUS; SUBCUTANEOUS at 22:16

## 2025-08-28 RX ADMIN — DEXAMETHASONE 6 MILLIGRAM(S): 0.5 TABLET ORAL at 06:42

## 2025-08-28 RX ADMIN — Medication 2 MILLIGRAM(S): at 15:59

## 2025-08-28 RX ADMIN — Medication 255 MILLIGRAM(S): at 06:05

## 2025-08-29 LAB
ALBUMIN SERPL ELPH-MCNC: 2.5 G/DL — LOW (ref 3.3–5)
ALBUMIN SERPL ELPH-MCNC: 2.6 G/DL — LOW (ref 3.3–5)
ALP SERPL-CCNC: 98 U/L — SIGNIFICANT CHANGE UP (ref 40–120)
ALP SERPL-CCNC: 99 U/L — SIGNIFICANT CHANGE UP (ref 40–120)
ALT FLD-CCNC: 22 U/L — SIGNIFICANT CHANGE UP (ref 12–78)
ALT FLD-CCNC: 22 U/L — SIGNIFICANT CHANGE UP (ref 12–78)
ANION GAP SERPL CALC-SCNC: 18 MMOL/L — HIGH (ref 5–17)
AST SERPL-CCNC: 25 U/L — SIGNIFICANT CHANGE UP (ref 15–37)
AST SERPL-CCNC: 26 U/L — SIGNIFICANT CHANGE UP (ref 15–37)
BILIRUB DIRECT SERPL-MCNC: 0.1 MG/DL — SIGNIFICANT CHANGE UP (ref 0–0.3)
BILIRUB INDIRECT FLD-MCNC: 0.3 MG/DL — SIGNIFICANT CHANGE UP (ref 0.2–1)
BILIRUB SERPL-MCNC: 0.4 MG/DL — SIGNIFICANT CHANGE UP (ref 0.2–1.2)
BILIRUB SERPL-MCNC: 0.4 MG/DL — SIGNIFICANT CHANGE UP (ref 0.2–1.2)
BUN SERPL-MCNC: 80 MG/DL — HIGH (ref 7–23)
CALCIUM SERPL-MCNC: 8.6 MG/DL — SIGNIFICANT CHANGE UP (ref 8.5–10.1)
CHLORIDE SERPL-SCNC: 93 MMOL/L — LOW (ref 96–108)
CO2 SERPL-SCNC: 22 MMOL/L — SIGNIFICANT CHANGE UP (ref 22–31)
CREAT SERPL-MCNC: 7.87 MG/DL — HIGH (ref 0.5–1.3)
EGFR: 7 ML/MIN/1.73M2 — LOW
EGFR: 7 ML/MIN/1.73M2 — LOW
GLUCOSE BLDC GLUCOMTR-MCNC: 105 MG/DL — HIGH (ref 70–99)
GLUCOSE BLDC GLUCOMTR-MCNC: 249 MG/DL — HIGH (ref 70–99)
GLUCOSE BLDC GLUCOMTR-MCNC: 274 MG/DL — HIGH (ref 70–99)
GLUCOSE BLDC GLUCOMTR-MCNC: 387 MG/DL — HIGH (ref 70–99)
GLUCOSE SERPL-MCNC: 226 MG/DL — HIGH (ref 70–99)
HCT VFR BLD CALC: 31.2 % — LOW (ref 34.5–45)
HGB BLD-MCNC: 10.2 G/DL — LOW (ref 11.5–15.5)
INR BLD: 0.97 RATIO — SIGNIFICANT CHANGE UP (ref 0.85–1.16)
MAGNESIUM SERPL-MCNC: 2.7 MG/DL — HIGH (ref 1.6–2.6)
MCHC RBC-ENTMCNC: 28.9 PG — SIGNIFICANT CHANGE UP (ref 27–34)
MCHC RBC-ENTMCNC: 32.7 G/DL — SIGNIFICANT CHANGE UP (ref 32–36)
MCV RBC AUTO: 88.4 FL — SIGNIFICANT CHANGE UP (ref 80–100)
NRBC BLD AUTO-RTO: 0 /100 WBCS — SIGNIFICANT CHANGE UP (ref 0–0)
PHOSPHATE SERPL-MCNC: 7 MG/DL — HIGH (ref 2.5–4.5)
PLATELET # BLD AUTO: 405 K/UL — HIGH (ref 150–400)
POTASSIUM SERPL-MCNC: 3.6 MMOL/L — SIGNIFICANT CHANGE UP (ref 3.5–5.3)
POTASSIUM SERPL-SCNC: 3.6 MMOL/L — SIGNIFICANT CHANGE UP (ref 3.5–5.3)
PROT SERPL-MCNC: 6.8 GM/DL — SIGNIFICANT CHANGE UP (ref 6–8.3)
PROT SERPL-MCNC: 6.9 GM/DL — SIGNIFICANT CHANGE UP (ref 6–8.3)
PROTHROM AB SERPL-ACNC: 11.2 SEC — SIGNIFICANT CHANGE UP (ref 9.9–13.4)
RBC # BLD: 3.53 M/UL — LOW (ref 3.8–5.2)
RBC # FLD: 14.6 % — HIGH (ref 10.3–14.5)
SODIUM SERPL-SCNC: 133 MMOL/L — LOW (ref 135–145)
WBC # BLD: 6.64 K/UL — SIGNIFICANT CHANGE UP (ref 3.8–10.5)
WBC # FLD AUTO: 6.64 K/UL — SIGNIFICANT CHANGE UP (ref 3.8–10.5)

## 2025-08-29 PROCEDURE — 99233 SBSQ HOSP IP/OBS HIGH 50: CPT

## 2025-08-29 RX ORDER — SEVELAMER HYDROCHLORIDE 800 MG/1
800 TABLET ORAL
Refills: 0 | Status: DISCONTINUED | OUTPATIENT
Start: 2025-08-29 | End: 2025-08-30

## 2025-08-29 RX ORDER — AZITHROMYCIN 250 MG
1 CAPSULE ORAL
Qty: 2 | Refills: 0
Start: 2025-08-29 | End: 2025-08-30

## 2025-08-29 RX ORDER — GLUCAGON 3 MG/1
1 POWDER NASAL
Qty: 1 | Refills: 0
Start: 2025-08-29

## 2025-08-29 RX ADMIN — HEPARIN SODIUM 1000 UNIT(S): 1000 INJECTION INTRAVENOUS; SUBCUTANEOUS at 16:40

## 2025-08-29 RX ADMIN — Medication 30 MILLILITER(S): at 17:15

## 2025-08-29 RX ADMIN — CALCITRIOL 0.25 MICROGRAM(S): 0.5 CAPSULE, GELATIN COATED ORAL at 11:50

## 2025-08-29 RX ADMIN — INSULIN LISPRO 3: 100 INJECTION, SOLUTION INTRAVENOUS; SUBCUTANEOUS at 16:43

## 2025-08-29 RX ADMIN — Medication 650 MILLIGRAM(S): at 16:40

## 2025-08-29 RX ADMIN — Medication 650 MILLIGRAM(S): at 16:09

## 2025-08-29 RX ADMIN — Medication 1 APPLICATION(S): at 05:56

## 2025-08-29 RX ADMIN — INSULIN LISPRO 2: 100 INJECTION, SOLUTION INTRAVENOUS; SUBCUTANEOUS at 07:43

## 2025-08-29 RX ADMIN — HEPARIN SODIUM 5000 UNIT(S): 1000 INJECTION INTRAVENOUS; SUBCUTANEOUS at 05:56

## 2025-08-29 RX ADMIN — HEPARIN SODIUM 5000 UNIT(S): 1000 INJECTION INTRAVENOUS; SUBCUTANEOUS at 13:26

## 2025-08-29 RX ADMIN — Medication 90 MILLIGRAM(S): at 05:56

## 2025-08-29 RX ADMIN — LOSARTAN POTASSIUM 25 MILLIGRAM(S): 100 TABLET, FILM COATED ORAL at 05:56

## 2025-08-29 RX ADMIN — DEXAMETHASONE 6 MILLIGRAM(S): 0.5 TABLET ORAL at 06:45

## 2025-08-29 RX ADMIN — Medication 40 MILLIGRAM(S): at 11:50

## 2025-08-29 RX ADMIN — Medication 255 MILLIGRAM(S): at 01:58

## 2025-08-29 RX ADMIN — INSULIN LISPRO 5: 100 INJECTION, SOLUTION INTRAVENOUS; SUBCUTANEOUS at 11:48

## 2025-08-29 RX ADMIN — Medication 4 MILLIGRAM(S): at 16:10

## 2025-08-29 RX ADMIN — Medication 4 MILLIGRAM(S): at 06:48

## 2025-08-29 RX ADMIN — INSULIN GLARGINE-YFGN 22 UNIT(S): 100 INJECTION, SOLUTION SUBCUTANEOUS at 22:47

## 2025-08-29 RX ADMIN — INSULIN LISPRO 3 UNIT(S): 100 INJECTION, SOLUTION INTRAVENOUS; SUBCUTANEOUS at 07:43

## 2025-08-29 RX ADMIN — INSULIN LISPRO 3 UNIT(S): 100 INJECTION, SOLUTION INTRAVENOUS; SUBCUTANEOUS at 11:49

## 2025-08-29 RX ADMIN — HEPARIN SODIUM 5000 UNIT(S): 1000 INJECTION INTRAVENOUS; SUBCUTANEOUS at 22:47

## 2025-08-29 RX ADMIN — INSULIN LISPRO 3 UNIT(S): 100 INJECTION, SOLUTION INTRAVENOUS; SUBCUTANEOUS at 19:04

## 2025-08-30 VITALS
DIASTOLIC BLOOD PRESSURE: 78 MMHG | OXYGEN SATURATION: 100 % | RESPIRATION RATE: 18 BRPM | TEMPERATURE: 98 F | SYSTOLIC BLOOD PRESSURE: 123 MMHG | HEART RATE: 107 BPM

## 2025-08-30 LAB
ALBUMIN SERPL ELPH-MCNC: 2.7 G/DL — LOW (ref 3.3–5)
ALP SERPL-CCNC: 90 U/L — SIGNIFICANT CHANGE UP (ref 40–120)
ALT FLD-CCNC: 30 U/L — SIGNIFICANT CHANGE UP (ref 12–78)
AST SERPL-CCNC: 27 U/L — SIGNIFICANT CHANGE UP (ref 15–37)
BILIRUB DIRECT SERPL-MCNC: 0.2 MG/DL — SIGNIFICANT CHANGE UP (ref 0–0.3)
BILIRUB INDIRECT FLD-MCNC: 0.1 MG/DL — LOW (ref 0.2–1)
BILIRUB SERPL-MCNC: 0.3 MG/DL — SIGNIFICANT CHANGE UP (ref 0.2–1.2)
GLUCOSE BLDC GLUCOMTR-MCNC: 84 MG/DL — SIGNIFICANT CHANGE UP (ref 70–99)
INR BLD: 0.96 RATIO — SIGNIFICANT CHANGE UP (ref 0.85–1.16)
PROT SERPL-MCNC: 6.7 GM/DL — SIGNIFICANT CHANGE UP (ref 6–8.3)
PROTHROM AB SERPL-ACNC: 11.1 SEC — SIGNIFICANT CHANGE UP (ref 9.9–13.4)

## 2025-08-30 PROCEDURE — 99238 HOSP IP/OBS DSCHRG MGMT 30/<: CPT

## 2025-08-30 RX ORDER — LABETALOL HYDROCHLORIDE 200 MG/1
10 TABLET, FILM COATED ORAL ONCE
Refills: 0 | Status: DISCONTINUED | OUTPATIENT
Start: 2025-08-30 | End: 2025-08-30

## 2025-08-30 RX ORDER — METOCLOPRAMIDE HCL 10 MG
5 TABLET ORAL ONCE
Refills: 0 | Status: COMPLETED | OUTPATIENT
Start: 2025-08-30 | End: 2025-08-30

## 2025-08-30 RX ORDER — METOCLOPRAMIDE HCL 10 MG
10 TABLET ORAL ONCE
Refills: 0 | Status: COMPLETED | OUTPATIENT
Start: 2025-08-30 | End: 2025-08-30

## 2025-08-30 RX ADMIN — Medication 90 MILLIGRAM(S): at 06:20

## 2025-08-30 RX ADMIN — SEVELAMER HYDROCHLORIDE 800 MILLIGRAM(S): 800 TABLET ORAL at 07:57

## 2025-08-30 RX ADMIN — Medication 3 MILLIGRAM(S): at 00:57

## 2025-08-30 RX ADMIN — Medication 5 MILLIGRAM(S): at 10:04

## 2025-08-30 RX ADMIN — LOSARTAN POTASSIUM 25 MILLIGRAM(S): 100 TABLET, FILM COATED ORAL at 06:20

## 2025-08-30 RX ADMIN — Medication 255 MILLIGRAM(S): at 01:45

## 2025-08-30 RX ADMIN — Medication 10 MILLIGRAM(S): at 01:45

## 2025-08-30 RX ADMIN — Medication 1 APPLICATION(S): at 06:19

## 2025-08-30 RX ADMIN — HEPARIN SODIUM 5000 UNIT(S): 1000 INJECTION INTRAVENOUS; SUBCUTANEOUS at 06:19

## 2025-08-30 RX ADMIN — Medication 650 MILLIGRAM(S): at 00:57

## 2025-08-30 RX ADMIN — Medication 4 MILLIGRAM(S): at 07:59

## 2025-08-30 RX ADMIN — DEXAMETHASONE 6 MILLIGRAM(S): 0.5 TABLET ORAL at 06:19

## 2025-09-05 DIAGNOSIS — E10.22 TYPE 1 DIABETES MELLITUS WITH DIABETIC CHRONIC KIDNEY DISEASE: ICD-10-CM

## 2025-09-05 DIAGNOSIS — N18.6 END STAGE RENAL DISEASE: ICD-10-CM

## 2025-09-05 DIAGNOSIS — Z79.4 LONG TERM (CURRENT) USE OF INSULIN: ICD-10-CM

## 2025-09-05 DIAGNOSIS — J81.0 ACUTE PULMONARY EDEMA: ICD-10-CM

## 2025-09-05 DIAGNOSIS — E10.649 TYPE 1 DIABETES MELLITUS WITH HYPOGLYCEMIA WITHOUT COMA: ICD-10-CM

## 2025-09-05 DIAGNOSIS — J15.7 PNEUMONIA DUE TO MYCOPLASMA PNEUMONIAE: ICD-10-CM

## 2025-09-05 DIAGNOSIS — R65.20 SEVERE SEPSIS WITHOUT SEPTIC SHOCK: ICD-10-CM

## 2025-09-05 DIAGNOSIS — J96.01 ACUTE RESPIRATORY FAILURE WITH HYPOXIA: ICD-10-CM

## 2025-09-05 DIAGNOSIS — Z53.29 PROCEDURE AND TREATMENT NOT CARRIED OUT BECAUSE OF PATIENT'S DECISION FOR OTHER REASONS: ICD-10-CM

## 2025-09-05 DIAGNOSIS — I16.0 HYPERTENSIVE URGENCY: ICD-10-CM

## 2025-09-05 DIAGNOSIS — R11.2 NAUSEA WITH VOMITING, UNSPECIFIED: ICD-10-CM

## 2025-09-05 DIAGNOSIS — Z99.2 DEPENDENCE ON RENAL DIALYSIS: ICD-10-CM

## 2025-09-05 DIAGNOSIS — I10 ESSENTIAL (PRIMARY) HYPERTENSION: ICD-10-CM

## 2025-09-05 DIAGNOSIS — E87.5 HYPERKALEMIA: ICD-10-CM

## 2025-09-05 DIAGNOSIS — A41.89 OTHER SPECIFIED SEPSIS: ICD-10-CM

## 2025-09-05 DIAGNOSIS — E10.65 TYPE 1 DIABETES MELLITUS WITH HYPERGLYCEMIA: ICD-10-CM

## 2025-09-05 DIAGNOSIS — J12.82 PNEUMONIA DUE TO CORONAVIRUS DISEASE 2019: ICD-10-CM

## 2025-09-05 DIAGNOSIS — U07.1 COVID-19: ICD-10-CM

## 2025-09-05 DIAGNOSIS — K59.00 CONSTIPATION, UNSPECIFIED: ICD-10-CM

## 2025-09-05 DIAGNOSIS — D63.1 ANEMIA IN CHRONIC KIDNEY DISEASE: ICD-10-CM

## (undated) DEVICE — CATH IV SAFE BC 18G X 1.16" (GREEN)

## (undated) DEVICE — SYR LUER LOK 20CC

## (undated) DEVICE — BLADE SURGICAL #11 CARBON

## (undated) DEVICE — SUT PROLENE 7-0 24" BV-1

## (undated) DEVICE — VESSEL LOOP ASPEN MINI YELLOW

## (undated) DEVICE — CLAMP BULLDOG MINI STRAIGHT (GREEN) DISP

## (undated) DEVICE — GLV 6 PROTEXIS (WHITE)

## (undated) DEVICE — BLADE SURGICAL #15 CARBON

## (undated) DEVICE — SUT VICRYL PLUS 3-0 27" SH UNDYED

## (undated) DEVICE — SUT PROLENE 6-0 24" BV-1

## (undated) DEVICE — DRAPE 3/4 SHEET W REINFORCEMENT 56X77"

## (undated) DEVICE — FRA-ESU BOVIE FORCE TRIAD T6D04777DX: Type: DURABLE MEDICAL EQUIPMENT

## (undated) DEVICE — NDL HYPO SAFE 25G X 1.5" (ORANGE)

## (undated) DEVICE — DRSG STERISTRIPS 0.5 X 4"

## (undated) DEVICE — DRSG TEGADERM 4 X 4.75"

## (undated) DEVICE — DRAPE TOWEL BLUE 17" X 24"

## (undated) DEVICE — Device

## (undated) DEVICE — ELCTR GROUNDING PAD ADULT COVIDIEN

## (undated) DEVICE — GLV 6.5 PROTEXIS (BLUE)

## (undated) DEVICE — SUT MONOCRYL 4-0 27" PS-2 UNDYED

## (undated) DEVICE — SUT SILK 3-0 18" TIES

## (undated) DEVICE — SUT SILK 4-0 18" TIES

## (undated) DEVICE — SUT SILK 2-0 30" TIES

## (undated) DEVICE — STAPLER SKIN MULTI DIRECTION W35